# Patient Record
Sex: FEMALE | Race: AMERICAN INDIAN OR ALASKA NATIVE
[De-identification: names, ages, dates, MRNs, and addresses within clinical notes are randomized per-mention and may not be internally consistent; named-entity substitution may affect disease eponyms.]

---

## 2021-11-01 ENCOUNTER — APPOINTMENT (OUTPATIENT)
Dept: SURGERY | Facility: CLINIC | Age: 61
End: 2021-11-01
Payer: MEDICAID

## 2021-11-01 ENCOUNTER — NON-APPOINTMENT (OUTPATIENT)
Age: 61
End: 2021-11-01

## 2021-11-01 VITALS
HEART RATE: 81 BPM | TEMPERATURE: 97.3 F | HEIGHT: 61 IN | WEIGHT: 125 LBS | DIASTOLIC BLOOD PRESSURE: 80 MMHG | BODY MASS INDEX: 23.6 KG/M2 | SYSTOLIC BLOOD PRESSURE: 120 MMHG

## 2021-11-01 DIAGNOSIS — Z80.1 FAMILY HISTORY OF MALIGNANT NEOPLASM OF TRACHEA, BRONCHUS AND LUNG: ICD-10-CM

## 2021-11-01 DIAGNOSIS — E11.9 TYPE 2 DIABETES MELLITUS W/OUT COMPLICATIONS: ICD-10-CM

## 2021-11-01 DIAGNOSIS — Z78.9 OTHER SPECIFIED HEALTH STATUS: ICD-10-CM

## 2021-11-01 PROCEDURE — 99204 OFFICE O/P NEW MOD 45 MIN: CPT

## 2021-11-01 NOTE — CONSULT LETTER
[Dear  ___] : Dear  [unfilled], [Consult Letter:] : I had the pleasure of evaluating your patient, [unfilled]. [Please see my note below.] : Please see my note below. [Consult Closing:] : Thank you very much for allowing me to participate in the care of this patient.  If you have any questions, please do not hesitate to contact me. [FreeTextEntry3] : Sincerely,\par \par Marcus Becerra MD, Colon and Rectal Surgery\par \par [employer] [facility]\par [address] [place] [address]\par [facility], 3rd Floor\par [place], [place] [address]\par Tel [phone] ext [phone]\par Fax [phone]\par  [DrMatilda  ___] : Dr. RIVERA

## 2021-11-01 NOTE — HISTORY OF PRESENT ILLNESS
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  Patient underwent colonoscopy on 10/12/21 with Dr. Hart that showed an endoscopically obstructing proximal transverse colon mass.  This was biopsy proven adenocarcinoma.  CT Chest abdomen and pelvis showed 5mm RLL lobe and a circumferential mass in the distal transverse colon and multiple liver cysts.  CEA was 31.4. Patient denies fevers, chills, nausea, vomiting, abdominal pain, constipation, diarrhea, blood in his stool or unexpected weight loss.  Patient denies a family history of colon cancer rectal cancer or inflammatory bowel disease. \par

## 2021-11-01 NOTE — ASSESSMENT
[FreeTextEntry1] : 61F with distal transverse colon adenocarcinoma\par \par I had a long conversation with the patient about her condition. At this time she has a transverse colon adenocarcinoma without definite evidence of metastasis. We discussed the need for liver MRI to evaluate the liver lesions. We will see her back after her images to discuss treatment in details.

## 2021-11-01 NOTE — PHYSICAL EXAM
[Abdomen Masses] : No abdominal masses [Abdomen Tenderness] : ~T No ~M abdominal tenderness [No HSM] : no hepatosplenomegaly [Respiratory Effort] : normal respiratory effort [Normal Rate and Rhythm] : normal rate and rhythm [de-identified] : awake, alert and in no acute distress

## 2021-11-02 ENCOUNTER — NON-APPOINTMENT (OUTPATIENT)
Age: 61
End: 2021-11-02

## 2021-11-22 ENCOUNTER — APPOINTMENT (OUTPATIENT)
Dept: SURGERY | Facility: CLINIC | Age: 61
End: 2021-11-22
Payer: MEDICAID

## 2021-11-22 VITALS
DIASTOLIC BLOOD PRESSURE: 76 MMHG | WEIGHT: 124 LBS | HEIGHT: 61 IN | BODY MASS INDEX: 23.41 KG/M2 | SYSTOLIC BLOOD PRESSURE: 130 MMHG | TEMPERATURE: 97.8 F | HEART RATE: 106 BPM | OXYGEN SATURATION: 98 %

## 2021-11-22 PROCEDURE — 99214 OFFICE O/P EST MOD 30 MIN: CPT

## 2021-11-22 NOTE — CONSULT LETTER
[Dear  ___] : Dear  [unfilled], [Courtesy Letter:] : I had the pleasure of seeing your patient, [unfilled], in my office today. [Please see my note below.] : Please see my note below. [Consult Closing:] : Thank you very much for allowing me to participate in the care of this patient.  If you have any questions, please do not hesitate to contact me. [FreeTextEntry3] : Sincerely,\par \par Marcus Becerra MD, Colon and Rectal Surgery\par \par [employer] at [facility]\par [address] [place] [address]\par [facility], 3rd Floor\par [place], [place] [address]\par Tel [phone] ext 2\par Fax [phone]\par  [DrMatilda  ___] : Dr. RIVERA

## 2021-11-22 NOTE — HISTORY OF PRESENT ILLNESS
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  Patient underwent colonoscopy on 10/12/21 with Dr. Hart that showed an endoscopically obstructing proximal transverse colon mass.  This was biopsy proven adenocarcinoma.  CT Chest abdomen and pelvis showed 5mm RLL lobe and a circumferential mass in the distal transverse colon and multiple liver cysts.  CEA was 31.4.  On her last visit she was sent for MRI of the liver which was negative for metastatic disease.  She presents to schedule surgery.  Patient denies fevers, chills, nausea, vomiting, abdominal pain, constipation, diarrhea, blood in his stool or unexpected weight loss.  Patient denies a family history of colon cancer rectal cancer or inflammatory bowel disease. \par

## 2021-11-22 NOTE — PHYSICAL EXAM
[Abdomen Masses] : No abdominal masses [Abdomen Tenderness] : ~T No ~M abdominal tenderness [No HSM] : no hepatosplenomegaly [Respiratory Effort] : normal respiratory effort [Normal Rate and Rhythm] : normal rate and rhythm [de-identified] : awake, alert and in no acute distress

## 2021-11-22 NOTE — ASSESSMENT
[FreeTextEntry1] : 61F with distal transverse colon adenocarcinoma\par \par I had a long discussion with the patient about her diagnosis. We discussed robot assisted laparoscopic splenic flexure resection, possible open, possible ostomy.  All risks benefits and alternatives were discussed including bleeding, infection, damage to surrounding structures, anastomotic leak, anastomotic stricture, cardiopulmonary events, thromboembolic events, neuropathy and hernia. We expect a 3-7 day hospital stay and 6-8 week recovery. Patient was in agreement with the plan.\par

## 2021-11-30 ENCOUNTER — NON-APPOINTMENT (OUTPATIENT)
Age: 61
End: 2021-11-30

## 2021-12-01 ENCOUNTER — INPATIENT (INPATIENT)
Facility: HOSPITAL | Age: 61
LOS: 4 days | Discharge: HOME | End: 2021-12-06
Attending: COLON & RECTAL SURGERY | Admitting: COLON & RECTAL SURGERY
Payer: MEDICAID

## 2021-12-01 VITALS
WEIGHT: 119.93 LBS | DIASTOLIC BLOOD PRESSURE: 58 MMHG | HEIGHT: 62 IN | TEMPERATURE: 96 F | RESPIRATION RATE: 20 BRPM | HEART RATE: 105 BPM | SYSTOLIC BLOOD PRESSURE: 139 MMHG | OXYGEN SATURATION: 100 %

## 2021-12-01 LAB
ALBUMIN SERPL ELPH-MCNC: 4.3 G/DL — SIGNIFICANT CHANGE UP (ref 3.5–5.2)
ALP SERPL-CCNC: 117 U/L — HIGH (ref 30–115)
ALT FLD-CCNC: 19 U/L — SIGNIFICANT CHANGE UP (ref 0–41)
ANION GAP SERPL CALC-SCNC: 19 MMOL/L — HIGH (ref 7–14)
ANISOCYTOSIS BLD QL: SLIGHT — SIGNIFICANT CHANGE UP
APPEARANCE UR: CLEAR — SIGNIFICANT CHANGE UP
AST SERPL-CCNC: 19 U/L — SIGNIFICANT CHANGE UP (ref 0–41)
BACTERIA # UR AUTO: NEGATIVE — SIGNIFICANT CHANGE UP
BASOPHILS # BLD AUTO: 0 K/UL — SIGNIFICANT CHANGE UP (ref 0–0.2)
BASOPHILS NFR BLD AUTO: 0 % — SIGNIFICANT CHANGE UP (ref 0–1)
BILIRUB SERPL-MCNC: 0.6 MG/DL — SIGNIFICANT CHANGE UP (ref 0.2–1.2)
BILIRUB UR-MCNC: NEGATIVE — SIGNIFICANT CHANGE UP
BUN SERPL-MCNC: 17 MG/DL — SIGNIFICANT CHANGE UP (ref 10–20)
CALCIUM SERPL-MCNC: 9 MG/DL — SIGNIFICANT CHANGE UP (ref 8.5–10.1)
CHLORIDE SERPL-SCNC: 96 MMOL/L — LOW (ref 98–110)
CO2 SERPL-SCNC: 21 MMOL/L — SIGNIFICANT CHANGE UP (ref 17–32)
COLOR SPEC: YELLOW — SIGNIFICANT CHANGE UP
CREAT SERPL-MCNC: 0.6 MG/DL — LOW (ref 0.7–1.5)
DIFF PNL FLD: NEGATIVE — SIGNIFICANT CHANGE UP
EOSINOPHIL # BLD AUTO: 0 K/UL — SIGNIFICANT CHANGE UP (ref 0–0.7)
EOSINOPHIL NFR BLD AUTO: 0 % — SIGNIFICANT CHANGE UP (ref 0–8)
EPI CELLS # UR: 1 /HPF — SIGNIFICANT CHANGE UP (ref 0–5)
GIANT PLATELETS BLD QL SMEAR: PRESENT — SIGNIFICANT CHANGE UP
GLUCOSE SERPL-MCNC: 191 MG/DL — HIGH (ref 70–99)
GLUCOSE UR QL: ABNORMAL
HCT VFR BLD CALC: 27.9 % — LOW (ref 37–47)
HGB BLD-MCNC: 8.3 G/DL — LOW (ref 12–16)
HYALINE CASTS # UR AUTO: 1 /LPF — SIGNIFICANT CHANGE UP (ref 0–7)
HYPOCHROMIA BLD QL: SIGNIFICANT CHANGE UP
KETONES UR-MCNC: ABNORMAL
LACTATE SERPL-SCNC: 2 MMOL/L — SIGNIFICANT CHANGE UP (ref 0.7–2)
LEUKOCYTE ESTERASE UR-ACNC: NEGATIVE — SIGNIFICANT CHANGE UP
LIDOCAIN IGE QN: 28 U/L — SIGNIFICANT CHANGE UP (ref 7–60)
LYMPHOCYTES # BLD AUTO: 0.82 K/UL — LOW (ref 1.2–3.4)
LYMPHOCYTES # BLD AUTO: 6.1 % — LOW (ref 20.5–51.1)
MANUAL SMEAR VERIFICATION: SIGNIFICANT CHANGE UP
MCHC RBC-ENTMCNC: 21.7 PG — LOW (ref 27–31)
MCHC RBC-ENTMCNC: 29.7 G/DL — LOW (ref 32–37)
MCV RBC AUTO: 72.8 FL — LOW (ref 81–99)
MICROCYTES BLD QL: SLIGHT — SIGNIFICANT CHANGE UP
MONOCYTES # BLD AUTO: 0.47 K/UL — SIGNIFICANT CHANGE UP (ref 0.1–0.6)
MONOCYTES NFR BLD AUTO: 3.5 % — SIGNIFICANT CHANGE UP (ref 1.7–9.3)
NEUTROPHILS # BLD AUTO: 12.1 K/UL — HIGH (ref 1.4–6.5)
NEUTROPHILS NFR BLD AUTO: 90.4 % — HIGH (ref 42.2–75.2)
NITRITE UR-MCNC: NEGATIVE — SIGNIFICANT CHANGE UP
NRBC # BLD: 1 /100 — HIGH (ref 0–0)
NRBC # BLD: SIGNIFICANT CHANGE UP /100 WBCS (ref 0–0)
OVALOCYTES BLD QL SMEAR: SLIGHT — SIGNIFICANT CHANGE UP
PH UR: 5.5 — SIGNIFICANT CHANGE UP (ref 5–8)
PLAT MORPH BLD: NORMAL — SIGNIFICANT CHANGE UP
PLATELET # BLD AUTO: 601 K/UL — HIGH (ref 130–400)
POIKILOCYTOSIS BLD QL AUTO: SIGNIFICANT CHANGE UP
POLYCHROMASIA BLD QL SMEAR: SLIGHT — SIGNIFICANT CHANGE UP
POTASSIUM SERPL-MCNC: 4 MMOL/L — SIGNIFICANT CHANGE UP (ref 3.5–5)
POTASSIUM SERPL-SCNC: 4 MMOL/L — SIGNIFICANT CHANGE UP (ref 3.5–5)
PROT SERPL-MCNC: 7.2 G/DL — SIGNIFICANT CHANGE UP (ref 6–8)
PROT UR-MCNC: ABNORMAL
RBC # BLD: 3.83 M/UL — LOW (ref 4.2–5.4)
RBC # FLD: 16.5 % — HIGH (ref 11.5–14.5)
RBC BLD AUTO: ABNORMAL
RBC CASTS # UR COMP ASSIST: 3 /HPF — SIGNIFICANT CHANGE UP (ref 0–4)
SCHISTOCYTES BLD QL AUTO: SLIGHT — SIGNIFICANT CHANGE UP
SODIUM SERPL-SCNC: 136 MMOL/L — SIGNIFICANT CHANGE UP (ref 135–146)
SP GR SPEC: 1.02 — SIGNIFICANT CHANGE UP (ref 1.01–1.03)
TROPONIN T SERPL-MCNC: <0.01 NG/ML — SIGNIFICANT CHANGE UP
UROBILINOGEN FLD QL: SIGNIFICANT CHANGE UP
WBC # BLD: 13.38 K/UL — HIGH (ref 4.8–10.8)
WBC # FLD AUTO: 13.38 K/UL — HIGH (ref 4.8–10.8)
WBC UR QL: 2 /HPF — SIGNIFICANT CHANGE UP (ref 0–5)

## 2021-12-01 PROCEDURE — 93010 ELECTROCARDIOGRAM REPORT: CPT

## 2021-12-01 PROCEDURE — 71045 X-RAY EXAM CHEST 1 VIEW: CPT | Mod: 26

## 2021-12-01 PROCEDURE — 99285 EMERGENCY DEPT VISIT HI MDM: CPT

## 2021-12-01 RX ORDER — SODIUM CHLORIDE 9 MG/ML
1000 INJECTION INTRAMUSCULAR; INTRAVENOUS; SUBCUTANEOUS ONCE
Refills: 0 | Status: COMPLETED | OUTPATIENT
Start: 2021-12-01 | End: 2021-12-01

## 2021-12-01 RX ORDER — IOHEXOL 300 MG/ML
30 INJECTION, SOLUTION INTRAVENOUS ONCE
Refills: 0 | Status: COMPLETED | OUTPATIENT
Start: 2021-12-01 | End: 2021-12-01

## 2021-12-01 RX ORDER — MORPHINE SULFATE 50 MG/1
4 CAPSULE, EXTENDED RELEASE ORAL ONCE
Refills: 0 | Status: DISCONTINUED | OUTPATIENT
Start: 2021-12-01 | End: 2021-12-01

## 2021-12-01 RX ADMIN — IOHEXOL 30 MILLILITER(S): 300 INJECTION, SOLUTION INTRAVENOUS at 20:33

## 2021-12-01 RX ADMIN — SODIUM CHLORIDE 1000 MILLILITER(S): 9 INJECTION INTRAMUSCULAR; INTRAVENOUS; SUBCUTANEOUS at 20:33

## 2021-12-01 RX ADMIN — MORPHINE SULFATE 4 MILLIGRAM(S): 50 CAPSULE, EXTENDED RELEASE ORAL at 20:34

## 2021-12-01 NOTE — ED ADULT NURSE NOTE - NSIMPLEMENTINTERV_GEN_ALL_ED
Implemented All Universal Safety Interventions:  [place] to call system. Call bell, personal items and telephone within reach. Instruct patient to call for assistance. Room bathroom lighting operational. Non-slip footwear when patient is off stretcher. Physically safe environment: no spills, clutter or unnecessary equipment. Stretcher in lowest position, wheels locked, appropriate side rails in place.

## 2021-12-01 NOTE — ED PROVIDER NOTE - PROGRESS NOTE DETAILS
AN: Sign out received from Dr. CYNDY Laboy.  Pt presented with complaints of abd pain, nausea and vomiting. + hx of colon ca. Required labs, imaging, meds. Pending CT results and will dispo accordingly. PRICILA LAWLER: disucssed with surgical team. they will come eval pt. PRICILA LAWLER: accepted to dr nieves freeman

## 2021-12-01 NOTE — ED PROVIDER NOTE - PHYSICAL EXAMINATION
Gen: NAD, AOx3  Head: NCAT  HEENT: PERRL, oral mucosa moist, normal conjunctiva, oropharynx clear without exudate or erythema  Lung: CTAB, no respiratory distress, no wheezing, rales, rhonchi  CV: normal s1/s2, rrr, Normal perfusion, pulses 2+ throughout  Abd: soft, diffuse tenderness, ND, no CVA tenderness  Genitourinary: no pelvic tenderness  MSK: No edema, no visible deformities, full range of motion in all 4 extremities  Neuro: CN II-XII grossly intact, No focal neurologic deficits  Skin: No rash   Psych: normal affect

## 2021-12-01 NOTE — ED PROVIDER NOTE - OBJECTIVE STATEMENT
60 yo female with a pmh of colon CA and DM presents c/o abdominal pain for 3 days. pain is described as sharp in nature diffusely with no radiation or noted aggravating/alleviating factors. pt has not passed her bowels in 3 days and since yesterday experienced 5 episodes of NBNB emesis. pt denies any other symptoms including fevers, chill, headache, recent illness/travel, cough, chest pain, or SOB.

## 2021-12-01 NOTE — ED PROVIDER NOTE - ATTENDING CONTRIBUTION TO CARE
60 yo F pmh colon ca and DM pw abd pain. Abd pain x3 days. Sharp, lower quadrant with no radiation. Associated with no BM for the past 3 days and several episodes nbnb vomiting. No cp, no sob, no urinary sx, no f/c, no back pain, no palpitations, no dizziness, no headache.     CONSTITUTIONAL: Well-developed; well-nourished; in no acute distress. Sitting up and providing appropriate history and physical examination  SKIN: skin exam is warm and dry, no acute rash.  HEAD: Normocephalic; atraumatic.  EYES: PERRL, 3 mm bilateral, no nystagmus, EOM intact; conjunctiva and sclera clear.  ENT: No nasal discharge; airway clear.  NECK: Supple; non tender. + full passive ROM in all directions. No JVD  CARD: S1, S2 normal; no murmurs, gallops, or rubs. Regular rate and rhythm. + Symmetric Strong Pulses  RESP: No wheezes, rales or rhonchi. Good air movement bilaterally  ABD: +ttp diffusely over abdomen. soft; non-distended. No Rebound, No Guarding, No signs of peritonitis, No CVA tenderness. No pulsatile abdominal mass. + Strong and Symmetric Pulses  EXT: Normal ROM. No clubbing, cyanosis or edema. Dp and Pt Pulses intact. Cap refill less than 3 seconds  NEURO: CN 2-12 intact, normal finger to nose, normal romberg, no sensory or motor deficits, Alert, oriented, grossly unremarkable. No Focal deficits. GCS 15. NIH 0  PSYCH: Cooperative, appropriate.

## 2021-12-01 NOTE — ED PROVIDER NOTE - CARE PLAN
Principal Discharge DX:	Small bowel obstruction  Secondary Diagnosis:	Large bowel obstruction  Secondary Diagnosis:	Colon cancer   1

## 2021-12-01 NOTE — ED ADULT NURSE NOTE - OBJECTIVE STATEMENT
Patient presents with c/o abdominal pain, vomiting and constipation x3 days. hx of colon ca. Denies fever, chills, diarrhea. As per patient, MD told her to come to ED for evaluation. Daughter assisting with translation.

## 2021-12-01 NOTE — ED ADULT TRIAGE NOTE - CHIEF COMPLAINT QUOTE
As per , "she has a lot of pain in her abdomen and has not had a bowel movement in a few days. She is also vomiting." Pt with hx of colon cancer.

## 2021-12-01 NOTE — ED PROVIDER NOTE - NS ED ROS FT
Constitutional: (-) fever  Eyes/ENT: (-) visual changes   Cardiovascular: (-) chest pain, (-) syncope  Respiratory: (-) cough, (-) shortness of breath  Gastrointestinal: (+) vomiting, (-) diarrhea, abd pain  Genitourinary: (-) dysuria, (-) hesitancy, (-) frequency   Musculoskeletal: (-) neck pain, (-) back pain, (-) joint pain  Integumentary: (-) rash, (-) edema  Neurological: (-) headache, (-) altered mental status  Allergic/Immunologic: (-) pruritus

## 2021-12-01 NOTE — ED PROVIDER NOTE - CLINICAL SUMMARY MEDICAL DECISION MAKING FREE TEXT BOX
Sign out received from Dr. CYNDY Laboy.  Pt presented with complaints of abd pain, nausea and vomiting. Required labs, imaging, meds. Was found to have small and large bowel obstruction. Surgical team consulted. Pt to be admitted to their service for further workup.

## 2021-12-02 ENCOUNTER — NON-APPOINTMENT (OUTPATIENT)
Age: 61
End: 2021-12-02

## 2021-12-02 LAB
ANION GAP SERPL CALC-SCNC: 16 MMOL/L — HIGH (ref 7–14)
BASOPHILS # BLD AUTO: 0.01 K/UL — SIGNIFICANT CHANGE UP (ref 0–0.2)
BASOPHILS NFR BLD AUTO: 0.2 % — SIGNIFICANT CHANGE UP (ref 0–1)
BUN SERPL-MCNC: 22 MG/DL — HIGH (ref 10–20)
CALCIUM SERPL-MCNC: 8.4 MG/DL — LOW (ref 8.5–10.1)
CHLORIDE SERPL-SCNC: 102 MMOL/L — SIGNIFICANT CHANGE UP (ref 98–110)
CO2 SERPL-SCNC: 19 MMOL/L — SIGNIFICANT CHANGE UP (ref 17–32)
CREAT SERPL-MCNC: 0.6 MG/DL — LOW (ref 0.7–1.5)
EOSINOPHIL # BLD AUTO: 0.01 K/UL — SIGNIFICANT CHANGE UP (ref 0–0.7)
EOSINOPHIL NFR BLD AUTO: 0.2 % — SIGNIFICANT CHANGE UP (ref 0–8)
GLUCOSE BLDC GLUCOMTR-MCNC: 146 MG/DL — HIGH (ref 70–99)
GLUCOSE BLDC GLUCOMTR-MCNC: 166 MG/DL — HIGH (ref 70–99)
GLUCOSE BLDC GLUCOMTR-MCNC: 167 MG/DL — HIGH (ref 70–99)
GLUCOSE SERPL-MCNC: 141 MG/DL — HIGH (ref 70–99)
HCT VFR BLD CALC: 24.9 % — LOW (ref 37–47)
HGB BLD-MCNC: 7.3 G/DL — LOW (ref 12–16)
IMM GRANULOCYTES NFR BLD AUTO: 0.2 % — SIGNIFICANT CHANGE UP (ref 0.1–0.3)
LACTATE SERPL-SCNC: 1.3 MMOL/L — SIGNIFICANT CHANGE UP (ref 0.7–2)
LYMPHOCYTES # BLD AUTO: 1.18 K/UL — LOW (ref 1.2–3.4)
LYMPHOCYTES # BLD AUTO: 18.8 % — LOW (ref 20.5–51.1)
MAGNESIUM SERPL-MCNC: 2.5 MG/DL — HIGH (ref 1.8–2.4)
MCHC RBC-ENTMCNC: 21.5 PG — LOW (ref 27–31)
MCHC RBC-ENTMCNC: 29.3 G/DL — LOW (ref 32–37)
MCV RBC AUTO: 73.5 FL — LOW (ref 81–99)
MONOCYTES # BLD AUTO: 0.59 K/UL — SIGNIFICANT CHANGE UP (ref 0.1–0.6)
MONOCYTES NFR BLD AUTO: 9.4 % — HIGH (ref 1.7–9.3)
NEUTROPHILS # BLD AUTO: 4.48 K/UL — SIGNIFICANT CHANGE UP (ref 1.4–6.5)
NEUTROPHILS NFR BLD AUTO: 71.2 % — SIGNIFICANT CHANGE UP (ref 42.2–75.2)
NRBC # BLD: 0 /100 WBCS — SIGNIFICANT CHANGE UP (ref 0–0)
PHOSPHATE SERPL-MCNC: 3.8 MG/DL — SIGNIFICANT CHANGE UP (ref 2.1–4.9)
PLATELET # BLD AUTO: 495 K/UL — HIGH (ref 130–400)
POTASSIUM SERPL-MCNC: 4.1 MMOL/L — SIGNIFICANT CHANGE UP (ref 3.5–5)
POTASSIUM SERPL-SCNC: 4.1 MMOL/L — SIGNIFICANT CHANGE UP (ref 3.5–5)
RBC # BLD: 3.39 M/UL — LOW (ref 4.2–5.4)
RBC # FLD: 16.9 % — HIGH (ref 11.5–14.5)
SARS-COV-2 RNA SPEC QL NAA+PROBE: SIGNIFICANT CHANGE UP
SODIUM SERPL-SCNC: 137 MMOL/L — SIGNIFICANT CHANGE UP (ref 135–146)
WBC # BLD: 6.28 K/UL — SIGNIFICANT CHANGE UP (ref 4.8–10.8)
WBC # FLD AUTO: 6.28 K/UL — SIGNIFICANT CHANGE UP (ref 4.8–10.8)

## 2021-12-02 PROCEDURE — 99223 1ST HOSP IP/OBS HIGH 75: CPT

## 2021-12-02 PROCEDURE — 71045 X-RAY EXAM CHEST 1 VIEW: CPT | Mod: 26

## 2021-12-02 PROCEDURE — 74177 CT ABD & PELVIS W/CONTRAST: CPT | Mod: 26,MA

## 2021-12-02 PROCEDURE — 99221 1ST HOSP IP/OBS SF/LOW 40: CPT | Mod: GC

## 2021-12-02 PROCEDURE — 71045 X-RAY EXAM CHEST 1 VIEW: CPT | Mod: 26,77

## 2021-12-02 RX ORDER — INSULIN LISPRO 100/ML
VIAL (ML) SUBCUTANEOUS EVERY 6 HOURS
Refills: 0 | Status: DISCONTINUED | OUTPATIENT
Start: 2021-12-02 | End: 2021-12-06

## 2021-12-02 RX ORDER — DEXTROSE 50 % IN WATER 50 %
15 SYRINGE (ML) INTRAVENOUS ONCE
Refills: 0 | Status: DISCONTINUED | OUTPATIENT
Start: 2021-12-02 | End: 2021-12-06

## 2021-12-02 RX ORDER — GLUCAGON INJECTION, SOLUTION 0.5 MG/.1ML
1 INJECTION, SOLUTION SUBCUTANEOUS ONCE
Refills: 0 | Status: DISCONTINUED | OUTPATIENT
Start: 2021-12-02 | End: 2021-12-06

## 2021-12-02 RX ORDER — SODIUM CHLORIDE 9 MG/ML
1000 INJECTION, SOLUTION INTRAVENOUS
Refills: 0 | Status: DISCONTINUED | OUTPATIENT
Start: 2021-12-02 | End: 2021-12-05

## 2021-12-02 RX ORDER — KETOROLAC TROMETHAMINE 30 MG/ML
15 SYRINGE (ML) INJECTION EVERY 6 HOURS
Refills: 0 | Status: DISCONTINUED | OUTPATIENT
Start: 2021-12-02 | End: 2021-12-06

## 2021-12-02 RX ORDER — ENOXAPARIN SODIUM 100 MG/ML
40 INJECTION SUBCUTANEOUS EVERY 24 HOURS
Refills: 0 | Status: DISCONTINUED | OUTPATIENT
Start: 2021-12-02 | End: 2021-12-06

## 2021-12-02 RX ORDER — PANTOPRAZOLE SODIUM 20 MG/1
40 TABLET, DELAYED RELEASE ORAL DAILY
Refills: 0 | Status: DISCONTINUED | OUTPATIENT
Start: 2021-12-02 | End: 2021-12-05

## 2021-12-02 RX ORDER — CHLORHEXIDINE GLUCONATE 213 G/1000ML
1 SOLUTION TOPICAL
Refills: 0 | Status: DISCONTINUED | OUTPATIENT
Start: 2021-12-02 | End: 2021-12-06

## 2021-12-02 RX ORDER — DEXTROSE 50 % IN WATER 50 %
25 SYRINGE (ML) INTRAVENOUS ONCE
Refills: 0 | Status: DISCONTINUED | OUTPATIENT
Start: 2021-12-02 | End: 2021-12-06

## 2021-12-02 RX ORDER — SODIUM CHLORIDE 9 MG/ML
1000 INJECTION, SOLUTION INTRAVENOUS
Refills: 0 | Status: DISCONTINUED | OUTPATIENT
Start: 2021-12-02 | End: 2021-12-06

## 2021-12-02 RX ADMIN — Medication 15 MILLIGRAM(S): at 07:44

## 2021-12-02 RX ADMIN — PANTOPRAZOLE SODIUM 40 MILLIGRAM(S): 20 TABLET, DELAYED RELEASE ORAL at 12:52

## 2021-12-02 RX ADMIN — SODIUM CHLORIDE 100 MILLILITER(S): 9 INJECTION, SOLUTION INTRAVENOUS at 07:45

## 2021-12-02 RX ADMIN — Medication 15 MILLIGRAM(S): at 19:55

## 2021-12-02 NOTE — ED ADULT NURSE REASSESSMENT NOTE - NS ED NURSE REASSESS COMMENT FT1
Patient resting calmly and comfortably at this time. No distress noted. Pending CT scan. Safety maintained.
Patient came to the ER with her daughter complaining of ABD pain. Patient is chinese speaking. Patient denies any SOB/CP. Patient abdominal  is distended. VSS. Will continue to monitor for acute changes in vitals.

## 2021-12-02 NOTE — PATIENT PROFILE ADULT - FALL HARM RISK - HARM RISK INTERVENTIONS

## 2021-12-02 NOTE — CONSULT NOTE ADULT - ATTENDING COMMENTS
Patient seen and examined during the GI advanced endoscopy rounds, case discussed with the team assessment and plan as above

## 2021-12-02 NOTE — H&P ADULT - ASSESSMENT
61F with a PMHX of colon CA, known mass at the distal transverse colon, and DM presents c/o abdominal pain with no bowel movements or gas for 3 days. Clinical presentation and imaging consistent with LBO 2/2 colonic mass.     Plan:  -Admission to Surgery Service   -GI consult for possible stenting across the area of obstruction  -NPO, IVF  -NGT placement for decompression   -Adequate pain control  -LVX for DVT ppx   -PTX for GI ppx  -Insulin sliding scale  -Patient and plan discussed with Dr. Becerra

## 2021-12-02 NOTE — H&P ADULT - ATTENDING COMMENTS
Patient is a 61F with PMH colon CA, schedule for OR on 12/16 and DM who presents with abdominal pain, constipation, nausea and vomiting.  Found to have LBO on CTAP.       Patient seen and examined.  Reports mild abdominal pain    Abdomen - soft, mild TTP, mild distention.  No rebound or guarding    Vitals labs and images reviewed    CTAP - Distal small bowel and and proximal large bowel obstruction to the level of the splenic flexure where there is a colonic mural nodule measuring 1.2 cm, concerning for primary neoplasm. Direct visualization with colonoscopy is suggested.    Plan  - NPO  - IVF  - NGT  - GI for colonic stent placement  - GI DVT PPX

## 2021-12-02 NOTE — ED ADULT NURSE REASSESSMENT NOTE - GENERAL PATIENT STATE
Handoff report taken from AMANDA Amanda. Pt in bed in no acute distress. Respirations even and unlabored. Daughter at the bedside./comfortable appearance/cooperative/family/SO at bedside/no change observed

## 2021-12-02 NOTE — H&P ADULT - NSHPPHYSICALEXAM_GEN_ALL_CORE
PHYSICAL EXAM:  GENERAL: NAD  CHEST/LUNG: Clear to auscultation bilaterally  HEART: Regular rate and rhythm  ABDOMEN: Soft, distended, with tenderness at the RLQ and umbilical region, no rebound or guarding  EXTREMITIES:  No clubbing, cyanosis, or edema

## 2021-12-02 NOTE — CONSULT NOTE ADULT - ASSESSMENT
61F with a PMHX of colon CA ( diagnosed 2 Months ago, by Dr. Donahue after colonoscopy for hematochezia, she was supposed to have surgery by Dr. Becerra this month))  and DM presents c/o abdominal pain with no bowel movements or gas for 3 days. She describes her pain as sharp in nature diffusely with no radiation or noted aggravating/alleviating factors. Reports her last BM/gas was on Monday and has had two days of nausea and vomiting. In the ED she is afebrile, mildly tachycardic to 105 (resolved), normotensive, saturating 97% on room air. On exam her abdomen is soft, distended, and tender in the RLQ and umbilical region, no rebound or guarding. Labs significant for WBC of 13.3, lactate normal at 2.0.      # LBO due to splenic flexure colonic mass:  - CT: Distal small bowel and and proximal large bowel obstruction to the level of the splenic flexure where there is a colonic mural nodule measuring 1.2 cm, concerning for primary neoplasm.  - ? liver mets on CT scan   - NG tube on suction--> about 100 cc clear liquid came out    Rec:  - NPO  - NG-tube on intermittent suction  - Serial abdominal exam  - Tap water enema X2   - dis cuss with attending regarding colonoscopy with possible colonic stent placmment 61F with a PMHX of colon CA ( diagnosed 2 Months ago, by Dr. Donahue after colonoscopy for hematochezia, she was supposed to have surgery by Dr. Becerra this month))  and DM presents c/o abdominal pain with no bowel movements or gas for 3 days. She describes her pain as sharp in nature diffusely with no radiation or noted aggravating/alleviating factors. Reports her last BM/gas was on Monday and has had two days of nausea and vomiting. In the ED she is afebrile, mildly tachycardic to 105 (resolved), normotensive, saturating 97% on room air. On exam her abdomen is soft, distended, and tender in the RLQ and umbilical region, no rebound or guarding. Labs significant for WBC of 13.3, lactate normal at 2.0.      # LBO due to splenic flexure colonic mass:  - CT: Distal small bowel and and proximal large bowel obstruction to the level of the splenic flexure where there is a colonic mural nodule measuring 1.2 cm, concerning for primary neoplasm.  - ? liver mets on CT scan   - NG tube on suction--> about 100 cc clear liquid came out    Rec:  - NPO  - NG-tube on intermittent suction  - Serial abdominal exam  - Miranda drip enema   - dis cuss with attending regarding colonoscopy with possible colonic stent placmment 61F with a PMHX of colon CA ( diagnosed 2 Months ago, by Dr. Donahue after colonoscopy for hematochezia, she was supposed to have surgery by Dr. Becerra this month))  and DM presents c/o abdominal pain with no bowel movements or gas for 3 days. She describes her pain as sharp in nature diffusely with no radiation or noted aggravating/alleviating factors. Reports her last BM/gas was on Monday and has had two days of nausea and vomiting. In the ED she is afebrile, mildly tachycardic to 105 (resolved), normotensive, saturating 97% on room air. On exam her abdomen is soft, distended, and tender in the RLQ and umbilical region, no rebound or guarding. Labs significant for WBC of 13.3, lactate normal at 2.0.      # LBO due to splenic flexure colonic mass:  - CT: Distal small bowel and and proximal large bowel obstruction to the level of the splenic flexure where there is a colonic mural nodule measuring 1.2 cm, concerning for primary neoplasm.  - ? liver mets on CT scan   - NG tube on suction--> about 100 cc clear liquid came out    Rec:  - NPO  - NG-tube on intermittent suction  - Serial abdominal exam  - Miranda drip enema   - colonoscopy with possible colonic stent placement tomorrow

## 2021-12-02 NOTE — CONSULT NOTE ADULT - SUBJECTIVE AND OBJECTIVE BOX
Gastroenterology Consultation:    Patient is a 61y old  Female who presents with a chief complaint of Large bowel obstruction (02 Dec 2021 03:54)      Admitted on: 12-02-21  HPI:  61F with a PMHX of colon CA ( diagnosed 2 Months ago, by Dr. Donahue after colonoscopy for hematochezia, she was supposed to have surgery by Dr. Becerra this month))  and DM presents c/o abdominal pain with no bowel movements or gas for 3 days. She describes her pain as sharp in nature diffusely with no radiation or noted aggravating/alleviating factors. Reports her last BM/gas was on Monday and has had two days of nausea and vomiting. In the ED she is afebrile, mildly tachycardic to 105 (resolved), normotensive, saturating 97% on room air. On exam her abdomen is soft, distended, and tender in the RLQ and umbilical region, no rebound or guarding. Labs significant for WBC of 13.3, lactate normal at 2.0.      Prior records Reviewed (Y/N): Y  History obtained from person other than patient (Y/N): Y, daughter at bedside)      Prior Colonoscopy:  Dr. Donahue 2 months ago, found colon cancer      PAST MEDICAL & SURGICAL HISTORY:      FAMILY HISTORY:  non-contributory    Social History:  Tobacco: N  Alcohol: N  Drugs: N    Home Medications:    MEDICATIONS  (STANDING):  chlorhexidine 4% Liquid 1 Application(s) Topical <User Schedule>  dextrose 40% Gel 15 Gram(s) Oral once  dextrose 5%. 1000 milliLiter(s) (50 mL/Hr) IV Continuous <Continuous>  dextrose 50% Injectable 25 Gram(s) IV Push once  enoxaparin Injectable 40 milliGRAM(s) SubCutaneous every 24 hours  glucagon  Injectable 1 milliGRAM(s) IntraMuscular once  insulin lispro (ADMELOG) corrective regimen sliding scale   SubCutaneous every 6 hours  lactated ringers. 1000 milliLiter(s) (100 mL/Hr) IV Continuous <Continuous>  pantoprazole  Injectable 40 milliGRAM(s) IV Push daily    MEDICATIONS  (PRN):  ketorolac   Injectable 15 milliGRAM(s) IV Push every 6 hours PRN Moderate Pain (4 - 6)      Allergies  No Known Allergies      Review of Systems:   Constitutional:  No Fever, No Chills  ENT/Mouth:  No Hearing Changes,  No Difficulty Swallowing  Eyes:  No Eye Pain, No Vision Changes  Cardiovascular:  No Chest Pain, No Palpitations  Respiratory:  No Cough, No Dyspnea  Gastrointestinal:  As described in HPI  Musculoskeletal:  No Joint Swelling, No Back Pain  Skin:  No Skin Lesions, No Jaundice  Neuro:  No Syncope, No Dizziness  Heme/Lymph:  No Bruising, No Bleeding.          Physical Examination:  T(C): 37.1 (12-02-21 @ 07:46), Max: 37.3 (12-02-21 @ 00:46)  HR: 61 (12-02-21 @ 07:46) (61 - 105)  BP: 115/68 (12-02-21 @ 07:46) (114/55 - 139/58)  RR: 19 (12-02-21 @ 07:46) (18 - 20)  SpO2: 98% (12-02-21 @ 07:46) (97% - 100%)  Height (cm): 157.5 (12-01-21 @ 18:39)  Weight (kg): 54.4 (12-01-21 @ 18:39)      Constitutional: No acute distress.  Eyes:. Conjunctivae are clear, Sclera is non-icteric.  Ears Nose and Throat: The external ears are normal appearing,  Oral mucosa is pink and moist.  Respiratory:  No signs of respiratory distress. Lung sounds are clear bilaterally.  Cardiovascular:  S1 S2, Regular rate and rhythm.  GI: Abdomen is soft, symmetric, and mild lower abdominal tenderness and mild abdominal distention. There are no visible lesions. Bowel sounds are present and normoactive in all four quadrants.  Neuro: No Tremor, No involuntary movements  Skin: No rashes, No Jaundice.          Data: (reviewed by attending)                        8.3    13.38 )-----------( 601      ( 01 Dec 2021 21:15 )             27.9     Hgb Trend:  8.3  12-01-21 @ 21:15      12-01    136  |  96<L>  |  17  ----------------------------<  191<H>  4.0   |  21  |  0.6<L>    Ca    9.0      01 Dec 2021 21:15    TPro  7.2  /  Alb  4.3  /  TBili  0.6  /  DBili  x   /  AST  19  /  ALT  19  /  AlkPhos  117<H>  12-01    Liver panel trend:  TBili 0.6   /   AST 19   /   ALT 19   /   AlkP 117   /   Tptn 7.2   /   Alb 4.3    /   DBili --      12-01              Radiology:(reviewed by attending)  CT Abdomen and Pelvis w/ Oral Cont and w/ IV Cont:   EXAM:  CT ABDOMEN AND PELVIS OC IC            PROCEDURE DATE:  12/02/2021            INTERPRETATION:  CLINICAL HISTORY / REASON FOR EXAM: Abdominal pain.    TECHNIQUE: Contiguous axial CT images were obtained from the lower chest to the pubic symphysis following administration of intravenous contrast. Oral contrast was administered. Reformatted images in the coronal and sagittal planes were acquired.    COMPARISON: None.    FINDINGS:    LOWER CHEST: Unremarkable.    HEPATOBILIARY: Numerous bilobar hepatic hypodensities measuring simple fluid.. Additional subcentimeter hypodensities too small to characterize.    SPLEEN: Unremarkable.    PANCREAS: Unremarkable.    ADRENAL GLANDS: Unremarkable.    KIDNEYS: Symmetric enhancement. No hydronephrosis. Right renal cyst.    ABDOMINOPELVIC NODES: No lymphadenopathy.    PELVIC ORGANS: Calcified fibroid uterus. Distended urinary bladder.    PERITONEUM/MESENTERY/BOWEL: Large bowel obstruction to the level of the splenic flexure where there is a colonic mural nodule measuring 1.2 cm (series 5 image 93) as well as abrupt change in caliber. Adjacent is an mesenteric infiltrative changes are noted as well. There is associated upstream dilatation of small bowel. No ascites or intraperitoneal free air. Unremarkable appendix.    BONES/SOFT TISSUES: No acute osseous abnormality. No definite lytic or blastic lesion is identified. There is grade 1 L4-L5 spondylolisthesis with associated lower lumbar facet arthropathy.    OTHER: Aorta is normal in caliber.      IMPRESSION:      Distal small bowel and and proximal large bowel obstruction to the level of the splenic flexure where there is a colonic mural nodule measuring 1.2 cm, concerning for primary neoplasm. Direct visualization with colonoscopy is suggested.    Numerous bilobar hepatic hypodensities measuring simple fluid and likely reflecting cysts. However, in view of the above findings, metastatic disease cannot be excluded. Further evaluation with MRI with and without contrast may be of benefit.    --- End of Report ---            BLU KAPOOR MD; Resident Radiologist  This document has been electronically signed.  ALANNA HENRIQUEZ MD; Attending Radiologist  This document has been electronically signed. Dec  2 2021  3:00AM (12-02-21 @ 01:34)

## 2021-12-02 NOTE — CONSULT NOTE ADULT - SUBJECTIVE AND OBJECTIVE BOX
GENERAL SURGERY CONSULT NOTE    Patient: WILFREDO SARKAR , 61y (56)Female   MRN: [id number]  Location: [facility] ED  Visit: 21 Emergency  Date: 21 @ 03:26    HPI:61F with a PMHX of colon CA and DM presents c/o abdominal pain for 3 days. pain is described as sharp in nature diffusely with no radiation or noted aggravating/alleviating factors. pt has not passed her bowels in 3 days and since yesterday experienced 5 episodes of NBNB emesis. pt denies any other symptoms including fevers, chill, headache, recent illness/travel, cough, chest pain, or SOB.    PAST MEDICAL & SURGICAL HISTORY:  -DM  -Colon Cancer    Home Medications:  -CALCIUM  -VITAMIN D  -MV  -FOLIC ACID  -METFORMIN      VITALS:  T(F): 99.1 (21 @ 00:46), Max: 99.1 (21 @ 00:46)  HR: 90 (21 @ 00:46) (90 - 105)  BP: 114/55 (21 @ 00:46) (114/55 - 139/58)  RR: 18 (21 @ 00:46) (18 - 20)  SpO2: 97% (21 @ 00:46) (97% - 100%)    PHYSICAL EXAM:  General: NAD, AAOx3, calm and cooperative  HEENT: NCAT, MARK, EOMI, Trachea ML, Neck supple  Cardiac: RRR S1, S2, no Murmurs, rubs or gallops  Respiratory: CTAB, normal respiratory effort, breath sounds equal BL, no wheeze, rhonchi or crackles  Abdomen: Soft, non-distended, non-tender, no rebound, no guarding. +BS.  Rectal: Good tone, +stool, no blood, no indigo-anal masses/lesions, no fistulas, fissures, hemorrhoids  Musculoskeletal: Strength 5/5 BL UE/LE, ROM intact, compartments soft  Neuro: Sensation grossly intact and equal throughout, no focal deficits  Vascular: Pulses 2+ throughout, extremities well perfused  Skin: Warm/dry, normal color, no jaundice  Incision/wound: healing well, dressings in place, clean, dry and intact    MEDICATIONS  (STANDING):    MEDICATIONS  (PRN):      LAB/STUDIES:                        8.3    13.38 )-----------( 601      ( 01 Dec 2021 21:15 )             27.9     12    136  |  96<L>  |  17  ----------------------------<  191<H>  4.0   |  21  |  0.6<L>    Ca    9.0      01 Dec 2021 21:15    TPro  7.2  /  Alb  4.3  /  TBili  0.6  /  DBili  x   /  AST  19  /  ALT  19  /  AlkPhos  117<H>  12-      LIVER FUNCTIONS - ( 01 Dec 2021 21:15 )  Alb: 4.3 g/dL / Pro: 7.2 g/dL / ALK PHOS: 117 U/L / ALT: 19 U/L / AST: 19 U/L / GGT: x           Urinalysis Basic - ( 01 Dec 2021 21:15 )    Color: Yellow / Appearance: Clear / S.022 / pH: x  Gluc: x / Ketone: Large  / Bili: Negative / Urobili: <2 mg/dL   Blood: x / Protein: 30 mg/dL / Nitrite: Negative   Leuk Esterase: Negative / RBC: 3 /HPF / WBC 2 /HPF   Sq Epi: x / Non Sq Epi: 1 /HPF / Bacteria: Negative    CARDIAC MARKERS ( 01 Dec 2021 21:15 )  x     / <0.01 ng/mL / x     / x     / x        IMAGING:  < from: CT Abdomen and Pelvis w/ Oral Cont and w/ IV Cont (21 @ 01:34) >  LOWER CHEST: Unremarkable.    HEPATOBILIARY: Numerous bilobar hepatic hypodensities measuring simple fluid.. Additional subcentimeter hypodensities too small to characterize.    SPLEEN: Unremarkable.    PANCREAS: Unremarkable.    ADRENAL GLANDS: Unremarkable.    KIDNEYS: Symmetric enhancement. No hydronephrosis. Right renal cyst.    ABDOMINOPELVIC NODES: No lymphadenopathy.    PELVIC ORGANS: Calcified fibroid uterus. Distended urinary bladder.    PERITONEUM/MESENTERY/BOWEL: Large bowel obstruction to the level of the splenic flexure where there is a colonic mural nodule measuring 1.2 cm (series 5 image 93) as well as abrupt change in caliber. Adjacent is an mesenteric infiltrative changes are noted as well. There is associated upstream dilatation of small bowel. No ascites or intraperitoneal free air. Unremarkable appendix.    BONES/SOFT TISSUES: No acute osseous abnormality. No definite lytic or blastic lesion is identified. There is grade 1 L4-L5 spondylolisthesis with associated lower lumbar facet arthropathy.    OTHER: Aorta is normal in caliber.    IMPRESSION:    Distal small bowel and and proximal large bowel obstruction to the level of the splenic flexure where there is a colonic mural nodule measuring 1.2 cm, concerning for primary neoplasm. Direct visualization with colonoscopy is suggested.    Numerous bilobar hepatic hypodensities measuring simple fluid and likely reflecting cysts. However, in view of the above findings, metastatic disease cannot be excluded. Further evaluation with MRI with and without contrast may be of benefit.    < end of copied text >      ACCESS DEVICES:  [ ] Peripheral IV  [ ] Central Venous Line	[ ] R	[ ] L	[ ] IJ	[ ] Fem	[ ] SC	Placed:   [ ] Arterial Line		[ ] R	[ ] L	[ ] Fem	[ ] Rad	[ ] Ax	Placed:   [ ] PICC:					[ ] Mediport  [ ] Urinary Catheter, Date Placed:     ASSESSMENT:  61F with a PMHX of colon CA and DM presents c/o abdominal pain for 3 days with no bowel movement and episodes of emesis. Surgery consulted to r/o bowel obstruction.    PLAN:  -  - Patient seen and examined with Senior Resident, Dr. Hampton  - Plan to be discussed with Attending, Dr. Hernandez    Lines/Tubes: PIV, Midline, Central Line, A-Line, Chest tubes, Zachary/VEENA drains, Rajput Catheter.    21 @ 03:26    CONSULT SPECTRA: [id number]   GENERAL SURGERY CONSULT NOTE    Patient: WILFREDO SARKAR , 61y (56)Female   MRN: [id number]  Location: [facility] ED  Visit: 21 Emergency  Date: 21 @ 03:26    Daughter translated at bedside     HPI:61F with a PMHX of colon CA and DM presents c/o abdominal pain for 3 days. Her pain is described as sharp in nature diffusely with no radiation or noted aggravating/alleviating factors. The patient has not passed her bowels in 3 days and has not passed gas. Since two days ago she experienced she 5 episodes of NBNB emesis. Patient denies any other symptoms including fevers, chill, headache, recent illness/travel, cough, chest pain, or SOB    PAST MEDICAL & SURGICAL HISTORY:  -DM  -Colon Cancer    Home Medications:  -CALCIUM  -VITAMIN D  -MV  -FOLIC ACID  -METFORMIN      VITALS:  T(F): 99.1 (21 @ 00:46), Max: 99.1 (21 @ 00:46)  HR: 90 (21 @ 00:46) (90 - 105)  BP: 114/55 (21 @ 00:46) (114/55 - 139/58)  RR: 18 (21 @ 00:46) (18 - 20)  SpO2: 97% (21 @ 00:46) (97% - 100%)    PHYSICAL EXAM:  General: NAD, AAOx3, calm and cooperative  HEENT: EOMI, Trachea ML, Neck supple  Cardiac: RRR   Respiratory: CTAB, normal respiratory effort, breath sounds equal BL, no wheeze, rhonchi or crackles  Abdomen: Soft, distended, tender, no rebound, no guarding  Skin: Warm/dry, normal color, no jaundice    MEDICATIONS  (STANDING):    MEDICATIONS  (PRN):      LAB/STUDIES:                        8.3    13.38 )-----------( 601      ( 01 Dec 2021 21:15 )             27.9         136  |  96<L>  |  17  ----------------------------<  191<H>  4.0   |  21  |  0.6<L>    Ca    9.0      01 Dec 2021 21:15    TPro  7.2  /  Alb  4.3  /  TBili  0.6  /  DBili  x   /  AST  19  /  ALT  19  /  AlkPhos  117<H>  12-01      LIVER FUNCTIONS - ( 01 Dec 2021 21:15 )  Alb: 4.3 g/dL / Pro: 7.2 g/dL / ALK PHOS: 117 U/L / ALT: 19 U/L / AST: 19 U/L / GGT: x           Urinalysis Basic - ( 01 Dec 2021 21:15 )    Color: Yellow / Appearance: Clear / S.022 / pH: x  Gluc: x / Ketone: Large  / Bili: Negative / Urobili: <2 mg/dL   Blood: x / Protein: 30 mg/dL / Nitrite: Negative   Leuk Esterase: Negative / RBC: 3 /HPF / WBC 2 /HPF   Sq Epi: x / Non Sq Epi: 1 /HPF / Bacteria: Negative    CARDIAC MARKERS ( 01 Dec 2021 21:15 )  x     / <0.01 ng/mL / x     / x     / x        IMAGING:  < from: CT Abdomen and Pelvis w/ Oral Cont and w/ IV Cont (21 @ 01:34) >  LOWER CHEST: Unremarkable.    HEPATOBILIARY: Numerous bilobar hepatic hypodensities measuring simple fluid.. Additional subcentimeter hypodensities too small to characterize.    SPLEEN: Unremarkable.    PANCREAS: Unremarkable.    ADRENAL GLANDS: Unremarkable.    KIDNEYS: Symmetric enhancement. No hydronephrosis. Right renal cyst.    ABDOMINOPELVIC NODES: No lymphadenopathy.    PELVIC ORGANS: Calcified fibroid uterus. Distended urinary bladder.    PERITONEUM/MESENTERY/BOWEL: Large bowel obstruction to the level of the splenic flexure where there is a colonic mural nodule measuring 1.2 cm (series 5 image 93) as well as abrupt change in caliber. Adjacent is an mesenteric infiltrative changes are noted as well. There is associated upstream dilatation of small bowel. No ascites or intraperitoneal free air. Unremarkable appendix.    BONES/SOFT TISSUES: No acute osseous abnormality. No definite lytic or blastic lesion is identified. There is grade 1 L4-L5 spondylolisthesis with associated lower lumbar facet arthropathy.    OTHER: Aorta is normal in caliber.    IMPRESSION:    Distal small bowel and and proximal large bowel obstruction to the level of the splenic flexure where there is a colonic mural nodule measuring 1.2 cm, concerning for primary neoplasm. Direct visualization with colonoscopy is suggested.    Numerous bilobar hepatic hypodensities measuring simple fluid and likely reflecting cysts. However, in view of the above findings, metastatic disease cannot be excluded. Further evaluation with MRI with and without contrast may be of benefit.    < end of copied text >

## 2021-12-02 NOTE — CONSULT NOTE ADULT - ASSESSMENT
ASSESSMENT:  61F with a PMHX of colon CA and DM presents c/o abdominal pain for 3 days with no bowel movement and episodes of emesis. Surgery consulted to r/o bowel obstruction.    PLAN:  - Patient seen and examined with Senior Resident, Dr. Hampton  - Plan to be discussed with Attending, Dr. Becerra    12-02-21 @ 03:26    CONSULT SPECTRA: [id number]

## 2021-12-02 NOTE — H&P ADULT - NSHPLABSRESULTS_GEN_ALL_CORE
Labs:                        8.3    13.38 )-----------( 601      ( 01 Dec 2021 21:15 )             27.9       Auto Neutrophil %: 90.4 % (21 @ 21:15)        136  |  96<L>  |  17  ----------------------------<  191<H>  4.0   |  21  |  0.6<L>    Calcium, Total Serum: 9.0 mg/dL (21 @ 21:15)    LFTs:             7.2  | 0.6  | 19       ------------------[117     ( 01 Dec 2021 21:15 )  4.3  | x    | 19          Lipase:28       Lactate, Blood: 2.0 mmol/L (21 @ 21:15)    Coags:    CARDIAC MARKERS ( 01 Dec 2021 21:15 )  x     / <0.01 ng/mL / x     / x     / x        Urinalysis Basic - ( 01 Dec 2021 21:15 )    Color: Yellow / Appearance: Clear / S.022 / pH: x  Gluc: x / Ketone: Large  / Bili: Negative / Urobili: <2 mg/dL   Blood: x / Protein: 30 mg/dL / Nitrite: Negative   Leuk Esterase: Negative / RBC: 3 /HPF / WBC 2 /HPF   Sq Epi: x / Non Sq Epi: 1 /HPF / Bacteria: Negative    Radiology:   < from: CT Abdomen and Pelvis w/ Oral Cont and w/ IV Cont (21 @ 01:34) >  IMPRESSION:  Distal small bowel and and proximal large bowel obstruction to the level of the splenic flexure where there is a colonic mural nodule measuring 1.2 cm, concerning for primary neoplasm. Direct visualization with colonoscopy is suggested.    Numerous bilobar hepatic hypodensities measuring simple fluid and likely reflecting cysts. However, in view of the above findings, metastatic disease cannot be excluded. Further evaluation with MRI with and without contrast may be of benefit.

## 2021-12-02 NOTE — H&P ADULT - HISTORY OF PRESENT ILLNESS
61F with a PMHX of colon CA and DM presents c/o abdominal pain with no bowel movements or gas for 3 days. She describes her pain as sharp in nature diffusely with no radiation or noted aggravating/alleviating factors. Reports her last BM/gas was 3 days ago and has had two days of nausea and vomiting. In the ED she is afebrile, mildly tachycardic to 105 (resolved), normotensive, saturating 97% on room air. On exam her abdomen is soft, distended, and tender in the RLQ and umbilical region, no rebound or guarding. Labs significant for WBC of 13.3, lactate normal at 2.0. CT with PO contrast demonstrates

## 2021-12-03 ENCOUNTER — RESULT REVIEW (OUTPATIENT)
Age: 61
End: 2021-12-03

## 2021-12-03 ENCOUNTER — TRANSCRIPTION ENCOUNTER (OUTPATIENT)
Age: 61
End: 2021-12-03

## 2021-12-03 LAB
A1C WITH ESTIMATED AVERAGE GLUCOSE RESULT: 6.6 % — HIGH (ref 4–5.6)
BLD GP AB SCN SERPL QL: SIGNIFICANT CHANGE UP
ESTIMATED AVERAGE GLUCOSE: 143 MG/DL — HIGH (ref 68–114)
GLUCOSE BLDC GLUCOMTR-MCNC: 112 MG/DL — HIGH (ref 70–99)
GLUCOSE BLDC GLUCOMTR-MCNC: 118 MG/DL — HIGH (ref 70–99)
GLUCOSE BLDC GLUCOMTR-MCNC: 139 MG/DL — HIGH (ref 70–99)
GLUCOSE BLDC GLUCOMTR-MCNC: 174 MG/DL — HIGH (ref 70–99)
HCT VFR BLD CALC: 24.8 % — LOW (ref 37–47)
HCV AB S/CO SERPL IA: 0.03 COI — SIGNIFICANT CHANGE UP
HCV AB SERPL-IMP: SIGNIFICANT CHANGE UP
HGB BLD-MCNC: 7.3 G/DL — LOW (ref 12–16)
MCHC RBC-ENTMCNC: 21.7 PG — LOW (ref 27–31)
MCHC RBC-ENTMCNC: 29.4 G/DL — LOW (ref 32–37)
MCV RBC AUTO: 73.6 FL — LOW (ref 81–99)
NRBC # BLD: 0 /100 WBCS — SIGNIFICANT CHANGE UP (ref 0–0)
PLATELET # BLD AUTO: 506 K/UL — HIGH (ref 130–400)
RBC # BLD: 3.37 M/UL — LOW (ref 4.2–5.4)
RBC # FLD: 16.9 % — HIGH (ref 11.5–14.5)
WBC # BLD: 6.64 K/UL — SIGNIFICANT CHANGE UP (ref 4.8–10.8)
WBC # FLD AUTO: 6.64 K/UL — SIGNIFICANT CHANGE UP (ref 4.8–10.8)

## 2021-12-03 PROCEDURE — 88305 TISSUE EXAM BY PATHOLOGIST: CPT | Mod: 26

## 2021-12-03 PROCEDURE — 88341 IMHCHEM/IMCYTCHM EA ADD ANTB: CPT | Mod: 26

## 2021-12-03 PROCEDURE — 45380 COLONOSCOPY AND BIOPSY: CPT | Mod: XU

## 2021-12-03 PROCEDURE — 45389 COLONOSCOPY W/STENT PLCMT: CPT

## 2021-12-03 PROCEDURE — 88342 IMHCHEM/IMCYTCHM 1ST ANTB: CPT | Mod: 26

## 2021-12-03 RX ADMIN — Medication 15 MILLIGRAM(S): at 02:45

## 2021-12-03 RX ADMIN — Medication 1: at 06:28

## 2021-12-03 RX ADMIN — ENOXAPARIN SODIUM 40 MILLIGRAM(S): 100 INJECTION SUBCUTANEOUS at 06:32

## 2021-12-03 RX ADMIN — Medication 15 MILLIGRAM(S): at 02:04

## 2021-12-03 RX ADMIN — PANTOPRAZOLE SODIUM 40 MILLIGRAM(S): 20 TABLET, DELAYED RELEASE ORAL at 13:11

## 2021-12-03 NOTE — CHART NOTE - NSCHARTNOTEFT_GEN_A_CORE
pt s/p flex sig w/ successful colonic stent placement    Impression:    Stool in the whole examined colon.      Mass (20 mm) in the splenic flexure. (Biopsy, Stent Placement 25 x 60 mm Walflex, Injection of Gladis ink).      Plan:  -Keep NPO today  -Serial abdominal exams  -Advance diet to clear liquids when cleared by colorectal surgery  -After discharge, she can be on low fiber foods – e.g. white pasta, mashed potatoes, white soft bread, smooth yoghurt, ice cream (without added fruit or nuts), jellies  -Rest of management per colorectal surgery pt s/p flex sig w/ successful colonic stent placement    Impression:    Stool in the whole examined colon.      Mass (20 mm) in the splenic flexure. (Biopsy, Stent Placement, Injection). Using a balloon catheter, a guidewire was advanced through the stricture traversing it. It was then followed by the balloon catheter and contrast was injected confirming location across the stricture. The balloon was then inflated and pulled back and the known malignant stricture measured around 15-20 mm. A 25 x 60 mm uncovered Walflex metallic stent was then placed successfully over the guidewire across the stricture and stool was noted to flow after placement. Correct positioning was confirmed endoscopically and fluoroscopically and the stent was positioned across the stricture.        Plan:  -Keep NPO today  -Serial abdominal exams  -Advance diet to clear liquids when cleared by colorectal surgery  -After discharge, she can be on low fiber foods – e.g. white pasta, mashed potatoes, white soft bread, smooth yoghurt, ice cream (without added fruit or nuts), jellies  -Rest of management per colorectal surgery

## 2021-12-03 NOTE — PRE-ANESTHESIA EVALUATION ADULT - NSANTHPMHFT_GEN_ALL_CORE
Cantonese speaking  Diabetes for about three years  Decreased tolerance for about 6 months,   New Dx Colon CA and Anemia for 3 months

## 2021-12-03 NOTE — CHART NOTE - NSCHARTNOTEFT_GEN_A_CORE
PACU ANESTHESIA ADMISSION NOTE      Procedure: Colonoscopy bx/stent insertion  Post op diagnosis:  colon obstruction    ____  Intubated  TV:______       Rate: ______      FiO2: ______    _x___  Patent Airway    _x___  Full return of protective reflexes    _x___  Full recovery from anesthesia / back to baseline status    Vitals:    see anesthesia record    Mental Status:  _x___ Awake   _____ Alert   _____ Drowsy   _____ Sedated    Nausea/Vomiting:  _x___  NO       ______Yes,   See Post - Op Orders         Pain Scale (0-10):  _____    Treatment: ____ None    __x__ See Post - Op/PCA Orders    Post - Operative Fluids:   ____ Oral   ___x_ See Post - Op Orders    Plan: Discharge:   ____Home       _____Floor     _____Critical Care    _____  Other:_________________    Comments:  No anesthesia issues or complications noted.  Discharge when criteria met.

## 2021-12-03 NOTE — PROGRESS NOTE ADULT - ASSESSMENT
A/P 61F with a PMHX of colon CA, known mass at the distal transverse colon, and DM presents c/o abdominal pain with no bowel movements or gas for 3 days. admitted for  LBO 2/2 colonic mass.   pt reports + BM   keep NPO, NGT, IVF  f/u GI for colonoscopy today  strict I&O   continue current care

## 2021-12-04 LAB
ABO RH CONFIRMATION: SIGNIFICANT CHANGE UP
ANION GAP SERPL CALC-SCNC: 15 MMOL/L — HIGH (ref 7–14)
ANION GAP SERPL CALC-SCNC: 19 MMOL/L — HIGH (ref 7–14)
BASOPHILS # BLD AUTO: 0.01 K/UL — SIGNIFICANT CHANGE UP (ref 0–0.2)
BASOPHILS # BLD AUTO: 0.01 K/UL — SIGNIFICANT CHANGE UP (ref 0–0.2)
BASOPHILS # BLD AUTO: 0.02 K/UL — SIGNIFICANT CHANGE UP (ref 0–0.2)
BASOPHILS # BLD AUTO: 0.02 K/UL — SIGNIFICANT CHANGE UP (ref 0–0.2)
BASOPHILS NFR BLD AUTO: 0.1 % — SIGNIFICANT CHANGE UP (ref 0–1)
BASOPHILS NFR BLD AUTO: 0.1 % — SIGNIFICANT CHANGE UP (ref 0–1)
BASOPHILS NFR BLD AUTO: 0.3 % — SIGNIFICANT CHANGE UP (ref 0–1)
BASOPHILS NFR BLD AUTO: 0.3 % — SIGNIFICANT CHANGE UP (ref 0–1)
BUN SERPL-MCNC: 13 MG/DL — SIGNIFICANT CHANGE UP (ref 10–20)
BUN SERPL-MCNC: 6 MG/DL — LOW (ref 10–20)
CALCIUM SERPL-MCNC: 7.5 MG/DL — LOW (ref 8.5–10.1)
CALCIUM SERPL-MCNC: 7.8 MG/DL — LOW (ref 8.5–10.1)
CHLORIDE SERPL-SCNC: 103 MMOL/L — SIGNIFICANT CHANGE UP (ref 98–110)
CHLORIDE SERPL-SCNC: 99 MMOL/L — SIGNIFICANT CHANGE UP (ref 98–110)
CO2 SERPL-SCNC: 18 MMOL/L — SIGNIFICANT CHANGE UP (ref 17–32)
CO2 SERPL-SCNC: 19 MMOL/L — SIGNIFICANT CHANGE UP (ref 17–32)
CREAT SERPL-MCNC: 0.5 MG/DL — LOW (ref 0.7–1.5)
CREAT SERPL-MCNC: <0.5 MG/DL — LOW (ref 0.7–1.5)
EOSINOPHIL # BLD AUTO: 0.02 K/UL — SIGNIFICANT CHANGE UP (ref 0–0.7)
EOSINOPHIL # BLD AUTO: 0.02 K/UL — SIGNIFICANT CHANGE UP (ref 0–0.7)
EOSINOPHIL # BLD AUTO: 0.03 K/UL — SIGNIFICANT CHANGE UP (ref 0–0.7)
EOSINOPHIL # BLD AUTO: 0.03 K/UL — SIGNIFICANT CHANGE UP (ref 0–0.7)
EOSINOPHIL NFR BLD AUTO: 0.3 % — SIGNIFICANT CHANGE UP (ref 0–8)
EOSINOPHIL NFR BLD AUTO: 0.3 % — SIGNIFICANT CHANGE UP (ref 0–8)
EOSINOPHIL NFR BLD AUTO: 0.4 % — SIGNIFICANT CHANGE UP (ref 0–8)
EOSINOPHIL NFR BLD AUTO: 0.5 % — SIGNIFICANT CHANGE UP (ref 0–8)
GLUCOSE BLDC GLUCOMTR-MCNC: 102 MG/DL — HIGH (ref 70–99)
GLUCOSE BLDC GLUCOMTR-MCNC: 90 MG/DL — SIGNIFICANT CHANGE UP (ref 70–99)
GLUCOSE BLDC GLUCOMTR-MCNC: 95 MG/DL — SIGNIFICANT CHANGE UP (ref 70–99)
GLUCOSE BLDC GLUCOMTR-MCNC: 97 MG/DL — SIGNIFICANT CHANGE UP (ref 70–99)
GLUCOSE SERPL-MCNC: 102 MG/DL — HIGH (ref 70–99)
GLUCOSE SERPL-MCNC: 78 MG/DL — SIGNIFICANT CHANGE UP (ref 70–99)
HCT VFR BLD CALC: 21.9 % — LOW (ref 37–47)
HCT VFR BLD CALC: 22.6 % — LOW (ref 37–47)
HCT VFR BLD CALC: 27.4 % — LOW (ref 37–47)
HCT VFR BLD CALC: 29.9 % — LOW (ref 37–47)
HGB BLD-MCNC: 6.6 G/DL — CRITICAL LOW (ref 12–16)
HGB BLD-MCNC: 6.6 G/DL — CRITICAL LOW (ref 12–16)
HGB BLD-MCNC: 8.4 G/DL — LOW (ref 12–16)
HGB BLD-MCNC: 9.2 G/DL — LOW (ref 12–16)
IMM GRANULOCYTES NFR BLD AUTO: 0 % — LOW (ref 0.1–0.3)
IMM GRANULOCYTES NFR BLD AUTO: 0.2 % — SIGNIFICANT CHANGE UP (ref 0.1–0.3)
IMM GRANULOCYTES NFR BLD AUTO: 0.3 % — SIGNIFICANT CHANGE UP (ref 0.1–0.3)
IMM GRANULOCYTES NFR BLD AUTO: 0.3 % — SIGNIFICANT CHANGE UP (ref 0.1–0.3)
LYMPHOCYTES # BLD AUTO: 1.76 K/UL — SIGNIFICANT CHANGE UP (ref 1.2–3.4)
LYMPHOCYTES # BLD AUTO: 1.8 K/UL — SIGNIFICANT CHANGE UP (ref 1.2–3.4)
LYMPHOCYTES # BLD AUTO: 1.98 K/UL — SIGNIFICANT CHANGE UP (ref 1.2–3.4)
LYMPHOCYTES # BLD AUTO: 2.09 K/UL — SIGNIFICANT CHANGE UP (ref 1.2–3.4)
LYMPHOCYTES # BLD AUTO: 26.2 % — SIGNIFICANT CHANGE UP (ref 20.5–51.1)
LYMPHOCYTES # BLD AUTO: 27 % — SIGNIFICANT CHANGE UP (ref 20.5–51.1)
LYMPHOCYTES # BLD AUTO: 29.6 % — SIGNIFICANT CHANGE UP (ref 20.5–51.1)
LYMPHOCYTES # BLD AUTO: 32.4 % — SIGNIFICANT CHANGE UP (ref 20.5–51.1)
MAGNESIUM SERPL-MCNC: 1.8 MG/DL — SIGNIFICANT CHANGE UP (ref 1.8–2.4)
MAGNESIUM SERPL-MCNC: 2.1 MG/DL — SIGNIFICANT CHANGE UP (ref 1.8–2.4)
MCHC RBC-ENTMCNC: 21.5 PG — LOW (ref 27–31)
MCHC RBC-ENTMCNC: 21.6 PG — LOW (ref 27–31)
MCHC RBC-ENTMCNC: 23.1 PG — LOW (ref 27–31)
MCHC RBC-ENTMCNC: 23.2 PG — LOW (ref 27–31)
MCHC RBC-ENTMCNC: 29.2 G/DL — LOW (ref 32–37)
MCHC RBC-ENTMCNC: 30.1 G/DL — LOW (ref 32–37)
MCHC RBC-ENTMCNC: 30.7 G/DL — LOW (ref 32–37)
MCHC RBC-ENTMCNC: 30.8 G/DL — LOW (ref 32–37)
MCV RBC AUTO: 71.8 FL — LOW (ref 81–99)
MCV RBC AUTO: 73.6 FL — LOW (ref 81–99)
MCV RBC AUTO: 75.1 FL — LOW (ref 81–99)
MCV RBC AUTO: 75.7 FL — LOW (ref 81–99)
MONOCYTES # BLD AUTO: 0.65 K/UL — HIGH (ref 0.1–0.6)
MONOCYTES # BLD AUTO: 0.66 K/UL — HIGH (ref 0.1–0.6)
MONOCYTES # BLD AUTO: 0.7 K/UL — HIGH (ref 0.1–0.6)
MONOCYTES # BLD AUTO: 0.71 K/UL — HIGH (ref 0.1–0.6)
MONOCYTES NFR BLD AUTO: 11.5 % — HIGH (ref 1.7–9.3)
MONOCYTES NFR BLD AUTO: 12 % — HIGH (ref 1.7–9.3)
MONOCYTES NFR BLD AUTO: 8.5 % — SIGNIFICANT CHANGE UP (ref 1.7–9.3)
MONOCYTES NFR BLD AUTO: 9.5 % — HIGH (ref 1.7–9.3)
NEUTROPHILS # BLD AUTO: 3.38 K/UL — SIGNIFICANT CHANGE UP (ref 1.4–6.5)
NEUTROPHILS # BLD AUTO: 3.42 K/UL — SIGNIFICANT CHANGE UP (ref 1.4–6.5)
NEUTROPHILS # BLD AUTO: 4.36 K/UL — SIGNIFICANT CHANGE UP (ref 1.4–6.5)
NEUTROPHILS # BLD AUTO: 4.95 K/UL — SIGNIFICANT CHANGE UP (ref 1.4–6.5)
NEUTROPHILS NFR BLD AUTO: 55.3 % — SIGNIFICANT CHANGE UP (ref 42.2–75.2)
NEUTROPHILS NFR BLD AUTO: 57.6 % — SIGNIFICANT CHANGE UP (ref 42.2–75.2)
NEUTROPHILS NFR BLD AUTO: 63.5 % — SIGNIFICANT CHANGE UP (ref 42.2–75.2)
NEUTROPHILS NFR BLD AUTO: 63.8 % — SIGNIFICANT CHANGE UP (ref 42.2–75.2)
NRBC # BLD: 0 /100 WBCS — SIGNIFICANT CHANGE UP (ref 0–0)
PHOSPHATE SERPL-MCNC: 2.9 MG/DL — SIGNIFICANT CHANGE UP (ref 2.1–4.9)
PHOSPHATE SERPL-MCNC: 3.4 MG/DL — SIGNIFICANT CHANGE UP (ref 2.1–4.9)
PLATELET # BLD AUTO: 425 K/UL — HIGH (ref 130–400)
PLATELET # BLD AUTO: 455 K/UL — HIGH (ref 130–400)
PLATELET # BLD AUTO: 456 K/UL — HIGH (ref 130–400)
PLATELET # BLD AUTO: 476 K/UL — HIGH (ref 130–400)
POTASSIUM SERPL-MCNC: 3.1 MMOL/L — LOW (ref 3.5–5)
POTASSIUM SERPL-MCNC: 3.8 MMOL/L — SIGNIFICANT CHANGE UP (ref 3.5–5)
POTASSIUM SERPL-SCNC: 3.1 MMOL/L — LOW (ref 3.5–5)
POTASSIUM SERPL-SCNC: 3.8 MMOL/L — SIGNIFICANT CHANGE UP (ref 3.5–5)
RBC # BLD: 3.05 M/UL — LOW (ref 4.2–5.4)
RBC # BLD: 3.07 M/UL — LOW (ref 4.2–5.4)
RBC # BLD: 3.62 M/UL — LOW (ref 4.2–5.4)
RBC # BLD: 3.98 M/UL — LOW (ref 4.2–5.4)
RBC # FLD: 17 % — HIGH (ref 11.5–14.5)
RBC # FLD: 17.2 % — HIGH (ref 11.5–14.5)
RBC # FLD: 18.7 % — HIGH (ref 11.5–14.5)
RBC # FLD: 19.2 % — HIGH (ref 11.5–14.5)
SODIUM SERPL-SCNC: 136 MMOL/L — SIGNIFICANT CHANGE UP (ref 135–146)
SODIUM SERPL-SCNC: 137 MMOL/L — SIGNIFICANT CHANGE UP (ref 135–146)
WBC # BLD: 5.94 K/UL — SIGNIFICANT CHANGE UP (ref 4.8–10.8)
WBC # BLD: 6.11 K/UL — SIGNIFICANT CHANGE UP (ref 4.8–10.8)
WBC # BLD: 6.87 K/UL — SIGNIFICANT CHANGE UP (ref 4.8–10.8)
WBC # BLD: 7.75 K/UL — SIGNIFICANT CHANGE UP (ref 4.8–10.8)
WBC # FLD AUTO: 5.94 K/UL — SIGNIFICANT CHANGE UP (ref 4.8–10.8)
WBC # FLD AUTO: 6.11 K/UL — SIGNIFICANT CHANGE UP (ref 4.8–10.8)
WBC # FLD AUTO: 6.87 K/UL — SIGNIFICANT CHANGE UP (ref 4.8–10.8)
WBC # FLD AUTO: 7.75 K/UL — SIGNIFICANT CHANGE UP (ref 4.8–10.8)

## 2021-12-04 PROCEDURE — 74018 RADEX ABDOMEN 1 VIEW: CPT | Mod: 26

## 2021-12-04 PROCEDURE — 71045 X-RAY EXAM CHEST 1 VIEW: CPT | Mod: 26

## 2021-12-04 PROCEDURE — 99231 SBSQ HOSP IP/OBS SF/LOW 25: CPT | Mod: GC

## 2021-12-04 PROCEDURE — 99232 SBSQ HOSP IP/OBS MODERATE 35: CPT

## 2021-12-04 RX ADMIN — PANTOPRAZOLE SODIUM 40 MILLIGRAM(S): 20 TABLET, DELAYED RELEASE ORAL at 12:53

## 2021-12-04 RX ADMIN — ENOXAPARIN SODIUM 40 MILLIGRAM(S): 100 INJECTION SUBCUTANEOUS at 06:26

## 2021-12-04 RX ADMIN — CHLORHEXIDINE GLUCONATE 1 APPLICATION(S): 213 SOLUTION TOPICAL at 06:27

## 2021-12-04 NOTE — CHART NOTE - NSCHARTNOTEFT_GEN_A_CORE
Hospitalist consult placed for preop clearance  case discussed with team [id number], no definitive date or plans for surgery  please recall once date set for most appropriate clearance/recommendations

## 2021-12-04 NOTE — PROGRESS NOTE ADULT - ASSESSMENT
61F with a PMHX of colon CA ( diagnosed 2 Months ago, by Dr. Donahue after colonoscopy for hematochezia, she was supposed to have surgery by Dr. Becerra this month))  and DM presents c/o abdominal pain with no bowel movements or gas for 3 days. She describes her pain as sharp in nature diffusely with no radiation or noted aggravating/alleviating factors. Reports her last BM/gas was on Monday and has had two days of nausea and vomiting. In the ED she is afebrile, mildly tachycardic to 105 (resolved), normotensive, saturating 97% on room air. On exam her abdomen is soft, distended, and tender in the RLQ and umbilical region, no rebound or guarding. Labs significant for WBC of 13.3, lactate normal at 2.0.    # LBO due to splenic flexure colonic mass s/p colonic stent placement on 12/3  - CT: Distal small bowel and and proximal large bowel obstruction to the level of the splenic flexure where there is a colonic mural nodule measuring 1.2 cm, concerning for primary neoplasm.  - ? liver mets on CT scan   - CF 12/3 ; stool in the colon. Mass (20 mm) in the splenic flexure. (Biopsy, Stent Placement, Injection). Using a balloon catheter, a guidewire was advanced through the stricture traversing it. It was then followed by the balloon catheter and contrast was injected confirming location across the stricture. The balloon was then inflated and pulled back and the known malignant stricture measured around 15-20 mm. A 25 x 60 mm uncovered Walflex metallic stent was then placed successfully over the guidewire across the stricture and stool was noted to flow after placement. Correct positioning was confirmed endoscopically and fluoroscopically and the stent was positioned across the stricture.   - pt is having bms s/p stent placement    Rec:  - advance diet as tolerated  - Serial abdominal exam  -After discharge, she can be on low fiber foods – e.g. white pasta, mashed potatoes, white soft bread, smooth yoghurt, ice cream (without added fruit or nuts), jellies  -Rest of management per colorectal surgery

## 2021-12-04 NOTE — PROGRESS NOTE ADULT - ASSESSMENT
ASSESSMENT:  61F with a PMHX of colon CA, known mass at the distal transverse colon, and DM presents c/o abdominal pain with no bowel movements or gas for 3 days. admitted for  LBO 2/2 colonic mass, now s/p colonic stent with GI on 12/3    -AM KUB  -keep NPO, NGT, IVF  -strict I&O   -Serial abdominal exams  -Transfuse 1u RBC 2/2 Hb 6.6  -AM CBC    Nicolas Concepcion MD  General Surgery PGY-1  BLUE TEAM SPECTRA [id number]

## 2021-12-05 LAB
ANION GAP SERPL CALC-SCNC: 17 MMOL/L — HIGH (ref 7–14)
BASOPHILS # BLD AUTO: 0.01 K/UL — SIGNIFICANT CHANGE UP (ref 0–0.2)
BASOPHILS NFR BLD AUTO: 0.1 % — SIGNIFICANT CHANGE UP (ref 0–1)
BUN SERPL-MCNC: 5 MG/DL — LOW (ref 10–20)
CALCIUM SERPL-MCNC: 8.4 MG/DL — LOW (ref 8.5–10.1)
CHLORIDE SERPL-SCNC: 102 MMOL/L — SIGNIFICANT CHANGE UP (ref 98–110)
CO2 SERPL-SCNC: 18 MMOL/L — SIGNIFICANT CHANGE UP (ref 17–32)
CREAT SERPL-MCNC: <0.5 MG/DL — LOW (ref 0.7–1.5)
EOSINOPHIL # BLD AUTO: 0.07 K/UL — SIGNIFICANT CHANGE UP (ref 0–0.7)
EOSINOPHIL NFR BLD AUTO: 0.8 % — SIGNIFICANT CHANGE UP (ref 0–8)
GLUCOSE BLDC GLUCOMTR-MCNC: 115 MG/DL — HIGH (ref 70–99)
GLUCOSE BLDC GLUCOMTR-MCNC: 84 MG/DL — SIGNIFICANT CHANGE UP (ref 70–99)
GLUCOSE BLDC GLUCOMTR-MCNC: 96 MG/DL — SIGNIFICANT CHANGE UP (ref 70–99)
GLUCOSE SERPL-MCNC: 129 MG/DL — HIGH (ref 70–99)
HCT VFR BLD CALC: 28.3 % — LOW (ref 37–47)
HGB BLD-MCNC: 8.7 G/DL — LOW (ref 12–16)
IMM GRANULOCYTES NFR BLD AUTO: 0.4 % — HIGH (ref 0.1–0.3)
LYMPHOCYTES # BLD AUTO: 1.67 K/UL — SIGNIFICANT CHANGE UP (ref 1.2–3.4)
LYMPHOCYTES # BLD AUTO: 19.7 % — LOW (ref 20.5–51.1)
MAGNESIUM SERPL-MCNC: 2 MG/DL — SIGNIFICANT CHANGE UP (ref 1.8–2.4)
MCHC RBC-ENTMCNC: 23 PG — LOW (ref 27–31)
MCHC RBC-ENTMCNC: 30.7 G/DL — LOW (ref 32–37)
MCV RBC AUTO: 74.9 FL — LOW (ref 81–99)
MONOCYTES # BLD AUTO: 0.72 K/UL — HIGH (ref 0.1–0.6)
MONOCYTES NFR BLD AUTO: 8.5 % — SIGNIFICANT CHANGE UP (ref 1.7–9.3)
NEUTROPHILS # BLD AUTO: 5.98 K/UL — SIGNIFICANT CHANGE UP (ref 1.4–6.5)
NEUTROPHILS NFR BLD AUTO: 70.5 % — SIGNIFICANT CHANGE UP (ref 42.2–75.2)
NRBC # BLD: 0 /100 WBCS — SIGNIFICANT CHANGE UP (ref 0–0)
PHOSPHATE SERPL-MCNC: 3.5 MG/DL — SIGNIFICANT CHANGE UP (ref 2.1–4.9)
PLATELET # BLD AUTO: 437 K/UL — HIGH (ref 130–400)
POTASSIUM SERPL-MCNC: 3.6 MMOL/L — SIGNIFICANT CHANGE UP (ref 3.5–5)
POTASSIUM SERPL-MCNC: 4 MMOL/L — SIGNIFICANT CHANGE UP (ref 3.5–5)
POTASSIUM SERPL-SCNC: 3.6 MMOL/L — SIGNIFICANT CHANGE UP (ref 3.5–5)
POTASSIUM SERPL-SCNC: 4 MMOL/L — SIGNIFICANT CHANGE UP (ref 3.5–5)
RBC # BLD: 3.78 M/UL — LOW (ref 4.2–5.4)
RBC # FLD: 18.7 % — HIGH (ref 11.5–14.5)
SODIUM SERPL-SCNC: 137 MMOL/L — SIGNIFICANT CHANGE UP (ref 135–146)
WBC # BLD: 8.48 K/UL — SIGNIFICANT CHANGE UP (ref 4.8–10.8)
WBC # FLD AUTO: 8.48 K/UL — SIGNIFICANT CHANGE UP (ref 4.8–10.8)

## 2021-12-05 PROCEDURE — 99222 1ST HOSP IP/OBS MODERATE 55: CPT

## 2021-12-05 PROCEDURE — 99231 SBSQ HOSP IP/OBS SF/LOW 25: CPT | Mod: GC

## 2021-12-05 PROCEDURE — 99232 SBSQ HOSP IP/OBS MODERATE 35: CPT

## 2021-12-05 RX ORDER — POLYETHYLENE GLYCOL 3350 17 G/17G
17 POWDER, FOR SOLUTION ORAL EVERY 12 HOURS
Refills: 0 | Status: DISCONTINUED | OUTPATIENT
Start: 2021-12-05 | End: 2021-12-06

## 2021-12-05 RX ORDER — ACETAMINOPHEN 500 MG
650 TABLET ORAL EVERY 6 HOURS
Refills: 0 | Status: DISCONTINUED | OUTPATIENT
Start: 2021-12-05 | End: 2021-12-06

## 2021-12-05 RX ORDER — SODIUM CHLORIDE 9 MG/ML
1000 INJECTION, SOLUTION INTRAVENOUS
Refills: 0 | Status: DISCONTINUED | OUTPATIENT
Start: 2021-12-05 | End: 2021-12-06

## 2021-12-05 RX ORDER — MAGNESIUM SULFATE 500 MG/ML
2 VIAL (ML) INJECTION ONCE
Refills: 0 | Status: COMPLETED | OUTPATIENT
Start: 2021-12-05 | End: 2021-12-05

## 2021-12-05 RX ORDER — POTASSIUM CHLORIDE 20 MEQ
20 PACKET (EA) ORAL
Refills: 0 | Status: COMPLETED | OUTPATIENT
Start: 2021-12-05 | End: 2021-12-05

## 2021-12-05 RX ORDER — PANTOPRAZOLE SODIUM 20 MG/1
40 TABLET, DELAYED RELEASE ORAL
Refills: 0 | Status: DISCONTINUED | OUTPATIENT
Start: 2021-12-05 | End: 2021-12-06

## 2021-12-05 RX ADMIN — CHLORHEXIDINE GLUCONATE 1 APPLICATION(S): 213 SOLUTION TOPICAL at 08:00

## 2021-12-05 RX ADMIN — Medication 50 MILLIEQUIVALENT(S): at 02:53

## 2021-12-05 RX ADMIN — Medication 650 MILLIGRAM(S): at 12:26

## 2021-12-05 RX ADMIN — Medication 650 MILLIGRAM(S): at 23:41

## 2021-12-05 RX ADMIN — POLYETHYLENE GLYCOL 3350 17 GRAM(S): 17 POWDER, FOR SOLUTION ORAL at 17:22

## 2021-12-05 RX ADMIN — Medication 650 MILLIGRAM(S): at 12:56

## 2021-12-05 RX ADMIN — ENOXAPARIN SODIUM 40 MILLIGRAM(S): 100 INJECTION SUBCUTANEOUS at 06:12

## 2021-12-05 RX ADMIN — Medication 50 GRAM(S): at 21:49

## 2021-12-05 RX ADMIN — Medication 50 MILLIEQUIVALENT(S): at 06:11

## 2021-12-05 RX ADMIN — Medication 50 MILLIEQUIVALENT(S): at 04:34

## 2021-12-05 NOTE — CONSULT NOTE ADULT - ASSESSMENT
* SUMMARY: Intermediate risk patient to proceed with intermediate risk procedure.    * Patient-based characteristics (Functional capacity)  Patient is able to achieve more than 4 MET (walk 4 blocks, climb 2 flights of stairs, etc...)          Y [X] / N []    High-risk patient features:  - Recent (<30 days) or active MI          Y [] / N [X]  - Unstable or severe angina          Y [] / N [X]  - Decompensated heart failure, or worsening or new-onset heart failure          Y [] / N [X]  - Severe valvular disease          Y [] / N [X]  - Significant arrhythmia (Tachy- or Bradyarrhythmia)          Y [] / N [X]    * Surgery/Procedure-based characteristics (Type of surgery)  - Low-risk procedure (outpatient procedure, elective, endoscopy, etc...)          Y [] / N []  - Elevated or Moderate-risk procedure (Inpatient)          Y [x] / N []  - High-risk procedure (urgent/emergent procedure, Intrathoracic, vascular, etc...)          Y [] / N []    * Revised Cardiac Risk Index (RCRI)  1- History of ischemic heart disease          Y [] / N [X]  2- History of congestive heart failure          Y [] / N [X]  3- History of stroke/TIA          Y [] / N [X]  4- History of insulin-dependent diabetes          Y [] / N [X]  5- Chronic kidney disease (Cr >2mg/dL)          Y [] / N [X]  6- Undergoing suprainguinal vascular, intraperitoneal, or intrathoracic surgery          Y [x] / N []    Class I risk (Zero factors) --> 3.9% (30-day risk of death, MI, or cardiac arrest)    * IMPRESSION & RECOMMENDATIONS:   Moderate risk patient for a Moderate risk surgery/procedure  No further cardiac work-up is needed at the moment. There are no current cardiac contraindications to prevent from proceeding with the scheduled surgery/procedure.    - This consult serves only as a indigo-operative cardiac risk stratification and evaluation to predict 30-days cardiac complications risk and mortality. The decision to proceed with the surgery/procedure is made by the performing physician and the patient - * SUMMARY: Intermediate risk patient to proceed with intermediate risk procedure.    * Patient-based characteristics (Functional capacity)  Patient is able to achieve more than 4 MET (walk 4 blocks, climb 2 flights of stairs, etc...)          Y [X] / N []    High-risk patient features:  - Recent (<30 days) or active MI          Y [] / N [X]  - Unstable or severe angina          Y [] / N [X]  - Decompensated heart failure, or worsening or new-onset heart failure          Y [] / N [X]  - Severe valvular disease          Y [] / N [X]  - Significant arrhythmia (Tachy- or Bradyarrhythmia)          Y [] / N [X]    * Surgery/Procedure-based characteristics (Type of surgery)  - Low-risk procedure (outpatient procedure, elective, endoscopy, etc...)          Y [] / N []  - Elevated or Moderate-risk procedure (Inpatient)          Y [x] / N []  - High-risk procedure (urgent/emergent procedure, Intrathoracic, vascular, etc...)          Y [] / N []    * Revised Cardiac Risk Index (RCRI)  1- History of ischemic heart disease          Y [] / N [X]  2- History of congestive heart failure          Y [] / N [X]  3- History of stroke/TIA          Y [] / N [X]  4- History of insulin-dependent diabetes          Y [] / N [X]  5- Chronic kidney disease (Cr >2mg/dL)          Y [] / N [X]  6- Undergoing suprainguinal vascular, intraperitoneal, or intrathoracic surgery          Y [x] / N []    Class I risk (Zero factors) --> 3.9% (30-day risk of death, MI, or cardiac arrest)    * IMPRESSION & RECOMMENDATIONS:   Moderate risk patient for a Moderate risk surgery/procedure  No further cardiac work-up is needed at the moment. There are no current cardiac contraindications to prevent from proceeding with the scheduled surgery/procedure.    - This consult serves only as a indigo-operative cardiac risk stratification and evaluation to predict 30-days cardiac complications risk and mortality. The decision to proceed with the surgery/procedure is made by the performing physician and the patient.

## 2021-12-05 NOTE — PROGRESS NOTE ADULT - ASSESSMENT
Patient seen and examined at bedside. NAD.  On Diet, NPO (12-02-21 @ 05:17) [Active]  Patient is hungry and would like to eat.       PLAN:  - advance diet when deemed appropriate by attending  - Monitor vitals  - Monitor labs and replete as necessary  - Monitor for bowel function  - Continue Pain Medications if necessary  - Continue Antibiotics if necessary  - Encourage ambulation as tolerated  - Monitor urine output  - DVT and GI Prophylaxis  - Follow PT/Physiatry if necessary  - surgical planning for colectomy. Cardiology consult placed   - recall medicine when have surgery date for clearance  - Contact social work/case management for necessary patient needs and dispo planning    Lines/Tubes: PIV    BLUE TEAM SPECTRA [id number]

## 2021-12-05 NOTE — CONSULT NOTE ADULT - SUBJECTIVE AND OBJECTIVE BOX
HISTORY OF PRESENT ILLNESS:   61F with a PMHX of colon CA ( diagnosed 2 Months ago, by Dr. Donahue after colonoscopy for hematochezia, she was supposed to have surgery by Dr. Becerra this month))  and DM presents c/o abdominal pain with no bowel movements or gas for 3 days. She describes her pain as sharp in nature diffusely with no radiation or noted aggravating/alleviating factors. Now s/p large colon stent placement for bowel stricture. CT abd reveals splenic flexure mass and hepatic mets? Cardiology consulted for pre-op eval.    PAST MEDICAL & SURGICAL HISTORY      FAMILY HISTORY:  FAMILY HISTORY:      SOCIAL HISTORY:  []smoker  []Alcohol  []Drug    ROS:  Negative except as mentioned in HPI    ALLERGIES:  No Known Allergies      MEDICATIONS:  MEDICATIONS  (STANDING):  acetaminophen     Tablet .. 650 milliGRAM(s) Oral every 6 hours  chlorhexidine 4% Liquid 1 Application(s) Topical <User Schedule>  dextrose 40% Gel 15 Gram(s) Oral once  dextrose 5% + sodium chloride 0.45%. 1000 milliLiter(s) (40 mL/Hr) IV Continuous <Continuous>  dextrose 5%. 1000 milliLiter(s) (50 mL/Hr) IV Continuous <Continuous>  dextrose 50% Injectable 25 Gram(s) IV Push once  enoxaparin Injectable 40 milliGRAM(s) SubCutaneous every 24 hours  glucagon  Injectable 1 milliGRAM(s) IntraMuscular once  insulin lispro (ADMELOG) corrective regimen sliding scale   SubCutaneous every 6 hours  magnesium sulfate  IVPB 2 Gram(s) IV Intermittent once  pantoprazole    Tablet 40 milliGRAM(s) Oral before breakfast  polyethylene glycol 3350 17 Gram(s) Oral every 12 hours    MEDICATIONS  (PRN):  ketorolac   Injectable 15 milliGRAM(s) IV Push every 6 hours PRN Moderate Pain (4 - 6)      HOME MEDICATIONS:  Home Medications:      VITALS:   T(F): 97.8 (12-05 @ 09:59), Max: 100.3 (12-04 @ 13:29)  HR: 87 (12-05 @ 09:59) (61 - 101)  BP: 153/72 (12-05 @ 09:59) (115/68 - 153/72)  BP(mean): 89 (12-04 @ 08:49) (89 - 89)  RR: 18 (12-05 @ 09:59) (18 - 19)  SpO2: 98% (12-05 @ 09:59) (94% - 98%)    I&O's Summary    04 Dec 2021 07:01  -  05 Dec 2021 07:00  --------------------------------------------------------  IN: 0 mL / OUT: 200 mL / NET: -200 mL        PHYSICAL EXAM:  GEN: Not in acute distress  HEENT: NCAT, PERRL, EOMI  LUNGS: Clear to auscultation bilaterally   CARDIOVASCULAR: RRR, S1/S2 present, no murmurs, rubs or gallops, no JVD, + PP bilaterally  ABD: Soft, non-tender, non-distended  EXT: No TOM  SKIN: Intact  NEURO: AAOx3    LABS:                        8.4    6.87  )-----------( 425      ( 04 Dec 2021 20:00 )             27.4     12-05    x   |  x   |  x   ----------------------------<  x   4.0   |  x   |  x     Ca    7.8<L>      04 Dec 2021 20:00  Phos  3.4     12-04  Mg     1.8     12-04          Troponin <0.01, CKMB --, CK --/ 12-01-21 @ 21:15        Hemoglobin A1C   Thyroid    EKG: NSR, TWI in III, AVF nonspecific HISTORY OF PRESENT ILLNESS:     61F with a PMHX of colon CA ( diagnosed 2 Months ago, by Dr. Donahue after colonoscopy for hematochezia, she was supposed to have surgery by Dr. Becerra this month))  and DM presents c/o abdominal pain with no bowel movements or gas for 3 days. She describes her pain as sharp in nature diffusely with no radiation or noted aggravating/alleviating factors. Now s/p large colon stent placement for bowel stricture. CT abd reveals splenic flexure mass and hepatic mets? Cardiology consulted for pre-op eval.    PAST MEDICAL & SURGICAL HISTORY      FAMILY HISTORY:  FAMILY HISTORY:      SOCIAL HISTORY:  []smoker  []Alcohol  []Drug    ROS:  Negative except as mentioned in HPI    ALLERGIES:  No Known Allergies      MEDICATIONS:  MEDICATIONS  (STANDING):  acetaminophen     Tablet .. 650 milliGRAM(s) Oral every 6 hours  chlorhexidine 4% Liquid 1 Application(s) Topical <User Schedule>  dextrose 40% Gel 15 Gram(s) Oral once  dextrose 5% + sodium chloride 0.45%. 1000 milliLiter(s) (40 mL/Hr) IV Continuous <Continuous>  dextrose 5%. 1000 milliLiter(s) (50 mL/Hr) IV Continuous <Continuous>  dextrose 50% Injectable 25 Gram(s) IV Push once  enoxaparin Injectable 40 milliGRAM(s) SubCutaneous every 24 hours  glucagon  Injectable 1 milliGRAM(s) IntraMuscular once  insulin lispro (ADMELOG) corrective regimen sliding scale   SubCutaneous every 6 hours  magnesium sulfate  IVPB 2 Gram(s) IV Intermittent once  pantoprazole    Tablet 40 milliGRAM(s) Oral before breakfast  polyethylene glycol 3350 17 Gram(s) Oral every 12 hours    MEDICATIONS  (PRN):  ketorolac   Injectable 15 milliGRAM(s) IV Push every 6 hours PRN Moderate Pain (4 - 6)      HOME MEDICATIONS:  Home Medications:      VITALS:   T(F): 97.8 (12-05 @ 09:59), Max: 100.3 (12-04 @ 13:29)  HR: 87 (12-05 @ 09:59) (61 - 101)  BP: 153/72 (12-05 @ 09:59) (115/68 - 153/72)  BP(mean): 89 (12-04 @ 08:49) (89 - 89)  RR: 18 (12-05 @ 09:59) (18 - 19)  SpO2: 98% (12-05 @ 09:59) (94% - 98%)    I&O's Summary    04 Dec 2021 07:01  -  05 Dec 2021 07:00  --------------------------------------------------------  IN: 0 mL / OUT: 200 mL / NET: -200 mL        PHYSICAL EXAM:  GEN: Not in acute distress  HEENT: NCAT, PERRL, EOMI  LUNGS: Clear to auscultation bilaterally   CARDIOVASCULAR: RRR, S1/S2 present, no murmurs, rubs or gallops, no JVD, + PP bilaterally  ABD: Soft, non-tender, non-distended  EXT: No TOM  SKIN: Intact  NEURO: AAOx3    LABS:                          8.4    6.87  )-----------( 425      ( 04 Dec 2021 20:00 )             27.4     12-05    x   |  x   |  x   ----------------------------<  x   4.0   |  x   |  x     Ca    7.8<L>      04 Dec 2021 20:00  Phos  3.4     12-04  Mg     1.8     12-04          Troponin <0.01, CKMB --, CK --/ 12-01-21 @ 21:15        Hemoglobin A1C   Thyroid    EKG: NSR, TWI in III, AVF nonspecific

## 2021-12-06 ENCOUNTER — LABORATORY RESULT (OUTPATIENT)
Age: 61
End: 2021-12-06

## 2021-12-06 ENCOUNTER — TRANSCRIPTION ENCOUNTER (OUTPATIENT)
Age: 61
End: 2021-12-06

## 2021-12-06 VITALS
TEMPERATURE: 100 F | DIASTOLIC BLOOD PRESSURE: 68 MMHG | SYSTOLIC BLOOD PRESSURE: 126 MMHG | HEART RATE: 90 BPM | RESPIRATION RATE: 18 BRPM

## 2021-12-06 LAB
GLUCOSE BLDC GLUCOMTR-MCNC: 146 MG/DL — HIGH (ref 70–99)
GLUCOSE BLDC GLUCOMTR-MCNC: 234 MG/DL — HIGH (ref 70–99)
GLUCOSE BLDC GLUCOMTR-MCNC: 75 MG/DL — SIGNIFICANT CHANGE UP (ref 70–99)

## 2021-12-06 PROCEDURE — 99232 SBSQ HOSP IP/OBS MODERATE 35: CPT | Mod: GC

## 2021-12-06 RX ORDER — POLYETHYLENE GLYCOL 3350 17 G/17G
17 POWDER, FOR SOLUTION ORAL
Qty: 0 | Refills: 0 | DISCHARGE
Start: 2021-12-06

## 2021-12-06 RX ORDER — ACETAMINOPHEN 500 MG
2 TABLET ORAL
Qty: 0 | Refills: 0 | DISCHARGE
Start: 2021-12-06

## 2021-12-06 RX ORDER — POTASSIUM CHLORIDE 20 MEQ
20 PACKET (EA) ORAL
Refills: 0 | Status: COMPLETED | OUTPATIENT
Start: 2021-12-06 | End: 2021-12-06

## 2021-12-06 RX ADMIN — POLYETHYLENE GLYCOL 3350 17 GRAM(S): 17 POWDER, FOR SOLUTION ORAL at 05:09

## 2021-12-06 RX ADMIN — ENOXAPARIN SODIUM 40 MILLIGRAM(S): 100 INJECTION SUBCUTANEOUS at 06:10

## 2021-12-06 RX ADMIN — Medication 650 MILLIGRAM(S): at 05:09

## 2021-12-06 RX ADMIN — Medication 50 MILLIEQUIVALENT(S): at 03:18

## 2021-12-06 RX ADMIN — Medication 50 MILLIEQUIVALENT(S): at 05:48

## 2021-12-06 RX ADMIN — Medication 2: at 12:19

## 2021-12-06 RX ADMIN — Medication 650 MILLIGRAM(S): at 11:55

## 2021-12-06 RX ADMIN — PANTOPRAZOLE SODIUM 40 MILLIGRAM(S): 20 TABLET, DELAYED RELEASE ORAL at 05:09

## 2021-12-06 NOTE — DISCHARGE NOTE NURSING/CASE MANAGEMENT/SOCIAL WORK - NSDCPEFALRISK_GEN_ALL_CORE
For information on Fall & Injury Prevention, visit: [email or web address] OR  [email or web address] OR  https://www.cdc.gov/steadi/patient.html

## 2021-12-06 NOTE — PROGRESS NOTE ADULT - REASON FOR ADMISSION
Large bowel obstruction

## 2021-12-06 NOTE — DISCHARGE NOTE PROVIDER - CARE PROVIDER_API CALL
Marcus Becerra)  ColonRectal Surgery; Surgery  [address], [address] [place] [address]  Phone: [phone]  Fax: [phone]  Scheduled Appointment: 12/08/2021

## 2021-12-06 NOTE — PROGRESS NOTE ADULT - ATTENDING COMMENTS
62 yo f with large bowel obstruction s/p stent.  see above for details  Plan surgical followup
Patient is a 61F with PMH colon CA, schedule for OR on 12/16 and DM who presents with abdominal pain, constipation, nausea and vomiting.  Found to have LBO on CTAP.       Patient seen and examined.  Reports mild abdominal pain.  Patient reports flatus, small BM    Abdomen - soft, mild TTP, mild distention.  No rebound or guarding    Vitals labs and images reviewed    CTAP - Distal small bowel and and proximal large bowel obstruction to the level of the splenic flexure where there is a colonic mural nodule measuring 1.2 cm, concerning for primary neoplasm. Direct visualization with colonoscopy is suggested.    Plan  - NPO  - IVF  - NGT  - GI for colonic stent placement  - GI DVT PPX
Patient is a 61F with PMH colon CA, schedule for OR on 12/16 and DM who presents with abdominal pain, constipation, nausea and vomiting.  Found to have LBO on CTAP.   s/p colonic stent placement    Patient seen and examined.  Patient reports improved abdominal pain.  Flatus and BM.    Abdomen - soft, mild TTP, mild distention.  No rebound or guarding    Vitals labs and images reviewed    CTAP - Distal small bowel and and proximal large bowel obstruction to the level of the splenic flexure where there is a colonic mural nodule measuring 1.2 cm, concerning for primary neoplasm. Direct visualization with colonoscopy is suggested.    Plan  - DC NGT  - CLD  - Miralax daily  - IVF  - 1 Unit RBC for Hb 6.6  - GI DVT PPX  - medical risk assessment  - plan to advance diet, DC home and proceed with surgery on 12/16
Patient is a 61F with PMH colon CA, schedule for OR on 12/16 and DM who presents with abdominal pain, constipation, nausea and vomiting.  Found to have LBO on CTAP.   s/p colonic stent placement    Patient seen and examined.  Patient reports improved abdominal pain.  Flatus and BM.    Abdomen - soft, mild TTP, mild distention.  No rebound or guarding    Vitals labs and images reviewed    CTAP - Distal small bowel and and proximal large bowel obstruction to the level of the splenic flexure where there is a colonic mural nodule measuring 1.2 cm, concerning for primary neoplasm. Direct visualization with colonoscopy is suggested.    Plan  - Advance to FLD only  - Miralax daily  - IVF  - GI DVT PPX  - medical risk assessment  - possible DC home in 24 hours  - Plan to proceed with surgery on 12/16
Patient is a 61F with PMH colon CA, schedule for OR on 12/16 and DM who presents with abdominal pain, constipation, nausea and vomiting.  Found to have LBO on CTAP.   s/p colonic stent placement    Patient seen and examined.  Patient reports improved abdominal pain.  Flatus and BM.    Abdomen - soft, mild TTP, mild distention.  No rebound or guarding    Vitals labs and images reviewed    CTAP - Distal small bowel and and proximal large bowel obstruction to the level of the splenic flexure where there is a colonic mural nodule measuring 1.2 cm, concerning for primary neoplasm. Direct visualization with colonoscopy is suggested.    Plan  - Advance to FLD only  - Miralax daily  - IVF  - GI DVT PPX  - medical risk assessment  - DC home today  - patient to follow up on 12/8 in the office  - Plan to proceed with surgery on 12/16

## 2021-12-06 NOTE — PROGRESS NOTE ADULT - SUBJECTIVE AND OBJECTIVE BOX
Patient is a 60 y/o female with History of recently diagnosed Colonic Mass, DM who presented with LBO. Patient had Flex Sigmoidoscopy with placement of a uncovered metallic stent crossing Splenic flexure mass. Patient feels better, less distension, passing gas and moving bowels. Patient scheduled for surgical intervention 12/16.      PAST MEDICAL & SURGICAL HISTORY:  DM  Colonic Mass      MEDICATIONS  (STANDING):  chlorhexidine 4% Liquid 1 Application(s) Topical <User Schedule>  dextrose 40% Gel 15 Gram(s) Oral once  dextrose 5%. 1000 milliLiter(s) (50 mL/Hr) IV Continuous <Continuous>  dextrose 50% Injectable 25 Gram(s) IV Push once  enoxaparin Injectable 40 milliGRAM(s) SubCutaneous every 24 hours  glucagon  Injectable 1 milliGRAM(s) IntraMuscular once  insulin lispro (ADMELOG) corrective regimen sliding scale   SubCutaneous every 6 hours  lactated ringers. 1000 milliLiter(s) (100 mL/Hr) IV Continuous <Continuous>  pantoprazole  Injectable 40 milliGRAM(s) IV Push daily    MEDICATIONS  (PRN):  ketorolac   Injectable 15 milliGRAM(s) IV Push every 6 hours PRN Moderate Pain (4 - 6)      Allergies  No Known Allergies      Review of Systems  General:  Denies Fatigue, Denies Fever, Denies Weakness ,Denies Weight Loss   HEENT: Denies Trouble Swallowing ,Denies  Sore Throat , Denies Change in hearing/vision/speech ,Denies Dizziness    Cardio: Denies  Chest Pain , Palpitations    Respiratory: Denies worsening of SOB, Denies Cough  Abdomen: See detailed HPI  Neuro: Denies Headache Denies Dizziness, Denies Paresthesias  MSK: Denies pain in Bones/Joints/Muscles   Psych: Patient denies depression, denies suicidal or homicidal ideations  Integ: Patient Denies rash, or new skin lesions         Vital Signs   T(F): 96.8 (06 Dec 2021 08:41), Max: 99.3 (05 Dec 2021 21:58)  HR: 77 (06 Dec 2021 08:41) (77 - 99)  BP: 114/60 (06 Dec 2021 08:41) (114/60 - 153/72)  RR: 18 (06 Dec 2021 08:41) (18 - 18)  SpO2: 96% (06 Dec 2021 04:59) (96% - 98%)  GENERAL: AAOx3, no acute distress.  HEAD:  Atraumatic, Normocephalic  EYES: conjunctiva and sclera clear  NECK: Supple, no JVD or thyromegaly  CHEST/LUNG: Clear to auscultation bilaterally; No wheeze, rhonchi, or rales  HEART: Regular rate and rhythm; normal S1, S2, No murmurs.  ABDOMEN: Soft, nontender, nondistended; Bowel sounds present  NEUROLOGY: No asterixis or tremor.   SKIN: Intact, no jaundice       Labs                        8.7    8.48  )-----------( 437      ( 05 Dec 2021 20:27 )             28.3     
Procedure/diagnosis LBO       PAST MEDICAL & SURGICAL HISTORY:  hx of colon cancer and DM    24H EVENTS    Vital Signs Last 24 Hrs  T(C): 37.4 (03 Dec 2021 09:57), Max: 37.6 (02 Dec 2021 21:03)  T(F): 99.3 (03 Dec 2021 09:57), Max: 99.6 (02 Dec 2021 21:03)  HR: 92 (03 Dec 2021 09:57) (92 - 101)  BP: 138/65 (03 Dec 2021 09:57) (123/62 - 149/66)  BP(mean): --  RR: 18 (03 Dec 2021 09:57) (18 - 19)  SpO2: 98% (03 Dec 2021 09:57) (94% - 98%)        I&O's Detail    02 Dec 2021 07:01  -  03 Dec 2021 07:00  --------------------------------------------------------  IN:  Total IN: 0 mL    OUT:    Nasogastric/Oral tube (mL): 210 mL    Voided (mL): 480 mL  Total OUT: 690 mL    Total NET: -690 mL      03 Dec 2021 07:01  -  03 Dec 2021 11:52  --------------------------------------------------------  IN:  Total IN: 0 mL    OUT:    Voided (mL): 350 mL  Total OUT: 350 mL    Total NET: -350 mL      Urinalysis Basic - ( 01 Dec 2021 21:15 )    Color: Yellow / Appearance: Clear / S.022 / pH: x  Gluc: x / Ketone: Large  / Bili: Negative / Urobili: <2 mg/dL   Blood: x / Protein: 30 mg/dL / Nitrite: Negative   Leuk Esterase: Negative / RBC: 3 /HPF / WBC 2 /HPF   Sq Epi: x / Non Sq Epi: 1 /HPF / Bacteria: Negative                   7.3    6.64  )-----------( 506      (  @ 05:38 )             24.8                7.3    6.28  )-----------( 495      (  @ 20:00 )             24.9                8.3    13.38 )-----------( 601      (  @ 21:15 )             27.9                    137   |  102   |  22                 Ca: 8.4    BMP:   ----------------------------< 141    M.5   (21 @ 20:00)             4.1    |  19    | 0.6                Ph: 3.8      LFT:     TPro: 7.2 / Alb: 4.3 / TBili: 0.6 / DBili: x / AST: 19 / ALT: 19 / AlkPhos: 117   (21 @ 21:15)      CARDIAC MARKERS ( 01 Dec 2021 21:15 )  x     / <0.01 ng/mL / x     / x     / x            MEDICATIONS  (STANDING):  chlorhexidine 4% Liquid 1 Application(s) Topical <User Schedule>  dextrose 40% Gel 15 Gram(s) Oral once  dextrose 5%. 1000 milliLiter(s) (50 mL/Hr) IV Continuous <Continuous>  dextrose 50% Injectable 25 Gram(s) IV Push once  enoxaparin Injectable 40 milliGRAM(s) SubCutaneous every 24 hours  glucagon  Injectable 1 milliGRAM(s) IntraMuscular once  insulin lispro (ADMELOG) corrective regimen sliding scale   SubCutaneous every 6 hours  lactated ringers. 1000 milliLiter(s) (100 mL/Hr) IV Continuous <Continuous>  pantoprazole  Injectable 40 milliGRAM(s) IV Push daily    MEDICATIONS  (PRN):  ketorolac   Injectable 15 milliGRAM(s) IV Push every 6 hours PRN Moderate Pain (4 - 6)      Diet, NPO (21 @ 05:17)      PHYSICAL EXAM:  General Appearance: pt in  NAD, NGT in place  Chest: Equal expansion bilaterally, equal breath sounds  CV: S1, S2, RRR  Abdomen: Soft , distended + mild tenderness on palp @ RLQ area                  no rebound tenderness, no guarding   Extremities: no calf tenderness  Neuro: A&Ox3      
Gastroenterology progress note:     Patient is a 61y old  Female who presents with a chief complaint of Large bowel obstruction (04 Dec 2021 03:26)       Admitted on: 12-02-21    We are following the patient for:  LBO       Interval History:    No acute events overnight.   - Diet - npo  - last BM - x2 this morning  - Abdominal pain - minimal      PAST MEDICAL & SURGICAL HISTORY:      MEDICATIONS  (STANDING):  chlorhexidine 4% Liquid 1 Application(s) Topical <User Schedule>  dextrose 40% Gel 15 Gram(s) Oral once  dextrose 5%. 1000 milliLiter(s) (50 mL/Hr) IV Continuous <Continuous>  dextrose 50% Injectable 25 Gram(s) IV Push once  enoxaparin Injectable 40 milliGRAM(s) SubCutaneous every 24 hours  glucagon  Injectable 1 milliGRAM(s) IntraMuscular once  insulin lispro (ADMELOG) corrective regimen sliding scale   SubCutaneous every 6 hours  lactated ringers. 1000 milliLiter(s) (100 mL/Hr) IV Continuous <Continuous>  pantoprazole  Injectable 40 milliGRAM(s) IV Push daily    MEDICATIONS  (PRN):  ketorolac   Injectable 15 milliGRAM(s) IV Push every 6 hours PRN Moderate Pain (4 - 6)      Allergies  No Known Allergies      Review of Systems:   Cardiovascular:  No Chest Pain, No Palpitations  Respiratory:  No Cough, No Dyspnea  Gastrointestinal:  As described in HPI  Skin:  No Skin Lesions, No Jaundice  Neuro:  No Syncope, No Dizziness    Physical Examination:  T(C): 37.1 (12-04-21 @ 08:49), Max: 37.3 (12-04-21 @ 00:57)  HR: 85 (12-04-21 @ 08:49) (82 - 98)  BP: 132/62 (12-04-21 @ 08:49) (123/66 - 141/75)  RR: 18 (12-04-21 @ 08:49) (18 - 18)  SpO2: 94% (12-04-21 @ 08:49) (94% - 96%)  Weight (kg): 54 (12-03-21 @ 17:30)    12-03-21 @ 07:01  -  12-04-21 @ 07:00  --------------------------------------------------------  IN: 900 mL / OUT: 850 mL / NET: 50 mL        GENERAL: AAOx3, no acute distress.  HEAD:  Atraumatic, Normocephalic  EYES: conjunctiva and sclera clear  NECK: Supple, no JVD or thyromegaly  CHEST/LUNG: Clear to auscultation bilaterally; No wheeze, rhonchi, or rales  HEART: Regular rate and rhythm; normal S1, S2, No murmurs.  ABDOMEN: Soft, nontender, nondistended; Bowel sounds present  NEUROLOGY: No asterixis or tremor.   SKIN: Intact, no jaundice     Data:                        6.6    6.11  )-----------( 456      ( 04 Dec 2021 01:46 )             21.9     Hgb trend:  6.6  12-04-21 @ 01:46  6.6  12-03-21 @ 20:00  7.3  12-03-21 @ 05:38  7.3  12-02-21 @ 20:00  8.3  12-01-21 @ 21:15        12-03    137  |  103  |  13  ----------------------------<  102<H>  3.8   |  19  |  0.5<L>    Ca    7.5<L>      03 Dec 2021 20:00  Phos  2.9     12-03  Mg     2.1     12-03      Liver panel trend:  TBili 0.6   /   AST 19   /   ALT 19   /   AlkP 117   /   Tptn 7.2   /   Alb 4.3    /   DBili --      12-01             Radiology:      
COLORECTAL SURGERY PROGRESS NOTE    Events of past 24 hours:   Patient s/p colonic stent placement by GI team in PM 12/3.  Patient tolerated procedure well and in NAD when seen overnight, denies pain, -Flatus, -BM, remains NPO with NGT in place and minimal to no output.  Denies chest pain, SOB, nausea, or any other symptoms.  Abdomen soft, nontender, nondistended.  PM Hb returned as 6.6, STAT repeat showing the same, patient afebrile, HR 80-90s, normotensive.  1uRBC ordered and pending at this time.    Vitals:   T(F): 99.2 (12-04-21 @ 00:57), Max: 99.3 (12-03-21 @ 09:57)  HR: 96 (12-04-21 @ 00:57) (82 - 98)  BP: 133/61 (12-04-21 @ 00:57) (133/61 - 143/67)  RR: 18 (12-04-21 @ 00:57)  SpO2: 94% (12-04-21 @ 00:57) (94% - 98%)      Diet, NPO      Fluids:     I & O's:    12-02-21 @ 07:01  -  12-03-21 @ 07:00  --------------------------------------------------------  IN:  Total IN: 0 mL    OUT:    Nasogastric/Oral tube (mL): 210 mL    Voided (mL): 480 mL  Total OUT: 690 mL    Total NET: -690 mL            PHYSICAL EXAM:  General Appearance: NAD  Heart: RR  Lungs: Unlabored breathing at rest  Abdomen:  Soft, nontender, nondistended. No guarding or rebound.  MSK/Extremities: Warm & well-perfused.   Skin: Warm, dry. No jaundice.       MEDICATIONS  (STANDING):  chlorhexidine 4% Liquid 1 Application(s) Topical <User Schedule>  dextrose 40% Gel 15 Gram(s) Oral once  dextrose 5%. 1000 milliLiter(s) (50 mL/Hr) IV Continuous <Continuous>  dextrose 50% Injectable 25 Gram(s) IV Push once  enoxaparin Injectable 40 milliGRAM(s) SubCutaneous every 24 hours  glucagon  Injectable 1 milliGRAM(s) IntraMuscular once  insulin lispro (ADMELOG) corrective regimen sliding scale   SubCutaneous every 6 hours  lactated ringers. 1000 milliLiter(s) (100 mL/Hr) IV Continuous <Continuous>  pantoprazole  Injectable 40 milliGRAM(s) IV Push daily    MEDICATIONS  (PRN):  ketorolac   Injectable 15 milliGRAM(s) IV Push every 6 hours PRN Moderate Pain (4 - 6)        LAB/STUDIES:                          6.6    6.11  )-----------( 456      ( 04 Dec 2021 01:46 )             21.9       12-03    137  |  103  |  13  ----------------------------<  102<H>  3.8   |  19  |  0.5<L>    Ca    7.5<L>      03 Dec 2021 20:00  Phos  2.9     12-03  Mg     2.1     12-03                        Lactate Trend  12-02 @ 20:00 Lactate:1.3   12-01 @ 21:15 Lactate:2.0             CAPILLARY BLOOD GLUCOSE      POCT Blood Glucose.: 102 mg/dL (04 Dec 2021 00:23)    
GENERAL SURGERY PROGRESS NOTE    Patient: WILFREDO SARKAR , 61y ([birth year])Female   MRN: [id number]  Location: [address]  Visit: 12-02-21 Inpatient  Date: 12-06-21 @ 09:02    Hospital Day #:5    Procedure/Dx/Injuries: LBO    Events of past 24 hours: Pt seen and examined at bedside. Tolerating FLD, +flatus, -BM. Cardiology evaluated for risk stratifications. No acute overnight events. Afebrile and vitals are stable    PAST MEDICAL & SURGICAL HISTORY:    Vitals:   T(F): 96.8 (12-06-21 @ 08:41), Max: 99.3 (12-05-21 @ 21:58)  HR: 77 (12-06-21 @ 08:41)  BP: 114/60 (12-06-21 @ 08:41)  RR: 18 (12-06-21 @ 08:41)  SpO2: 96% (12-06-21 @ 04:59)    Diet, Full Liquid    PHYSICAL EXAM:  General: NAD  HEENT: NCAT, EOMI  Cardiac: RRR S1, S2  Respiratory: CTAB, normal respiratory effort  Abdomen: Soft, non-distended, non-tender, no rebound, no guarding  Skin: Warm/dry, normal color, no jaundice    MEDICATIONS  (STANDING):  acetaminophen     Tablet .. 650 milliGRAM(s) Oral every 6 hours  chlorhexidine 4% Liquid 1 Application(s) Topical <User Schedule>  dextrose 40% Gel 15 Gram(s) Oral once  dextrose 5% + sodium chloride 0.45%. 1000 milliLiter(s) (40 mL/Hr) IV Continuous <Continuous>  dextrose 5%. 1000 milliLiter(s) (50 mL/Hr) IV Continuous <Continuous>  dextrose 50% Injectable 25 Gram(s) IV Push once  enoxaparin Injectable 40 milliGRAM(s) SubCutaneous every 24 hours  glucagon  Injectable 1 milliGRAM(s) IntraMuscular once  insulin lispro (ADMELOG) corrective regimen sliding scale   SubCutaneous every 6 hours  pantoprazole    Tablet 40 milliGRAM(s) Oral before breakfast  polyethylene glycol 3350 17 Gram(s) Oral every 12 hours    MEDICATIONS  (PRN):  ketorolac   Injectable 15 milliGRAM(s) IV Push every 6 hours PRN Moderate Pain (4 - 6)    DVT PROPHYLAXIS: enoxaparin Injectable 40 milliGRAM(s) SubCutaneous every 24 hours  GI PROPHYLAXIS: pantoprazole    Tablet 40 milliGRAM(s) Oral before breakfast    LAB/STUDIES:  Labs:  CAPILLARY BLOOD GLUCOSE  POCT Blood Glucose.: 146 mg/dL (06 Dec 2021 04:29)  POCT Blood Glucose.: 115 mg/dL (05 Dec 2021 23:54)  POCT Blood Glucose.: 96 mg/dL (05 Dec 2021 12:17)                        8.7    8.48  )-----------( 437      ( 05 Dec 2021 20:27 )             28.3       Auto Neutrophil %: 70.5 % (12-05-21 @ 20:27)  Auto Immature Granulocyte %: 0.4 % (12-05-21 @ 20:27)    12-05    137  |  102  |  5<L>  ----------------------------<  129<H>  3.6   |  18  |  <0.5<L>    Calcium, Total Serum: 8.4 mg/dL (12-05-21 @ 20:27)    IMAGING:  No new imaging past 24hrs         
GENERAL SURGERY PROGRESS NOTE    Patient: WILFREDO SARKAR , 61y ([birth year])Female   MRN: [id number]  Location: [address]  Visit: 12-02-21 Inpatient  Date: 12-05-21 @ 06:07        Admitted :12-02-21 (3d)  LOS: 3d    Procedure/Dx/Injuries: Small bowel obstruction      Events of past 24 hours: Patient seen and examined at bedside. No acute events overnight. Afebrile, VSS. NGT removed yesterday patient hungry, would like to eat, +BM/g, +ambulating. Electrolytes repleted. Information obtained via [employer]  Lily # [id number]    >>> <<<>>> <<<>>> <<<>>> <<<>>> <<<>>> <<<>>> <<<>>> <<<>>> <<<>>> <<<  Vitals:   T(F): 98.4 (12-05-21 @ 05:00), Max: 100.3 (12-04-21 @ 13:29)  HR: 84 (12-05-21 @ 05:00)  BP: 125/61 (12-05-21 @ 05:00)  RR: 18 (12-05-21 @ 05:00)  SpO2: 96% (12-05-21 @ 05:00)      PHYSICAL EXAM:  General Appearance: NAD  HEENT: Normocephalic, atraumatic, trachea midline  Heart: s1, s2,    Lungs: No increased work of breathing or accessory muscle use. Symmetric chest wall rise and fall. Bilateral breath sounds  Abdomen:  Soft, nontender, nondistended. No rebound or guarding.  MSK/Extremities: Warm & well-perfused.   Skin: Warm, dry. No jaundice.   >>> <<<>>> <<<>>> <<<>>> <<<>>> <<<>>> <<<>>> <<<>>> <<<>>> <<<>>> <<<  Diet, NPO      Fluids:     I & O's:    12-03-21 @ 07:01  -  12-04-21 @ 07:00  --------------------------------------------------------  IN:    Lactated Ringers: 900 mL  Total IN: 900 mL    OUT:    Nasogastric/Oral tube (mL): 150 mL    Voided (mL): 700 mL  Total OUT: 850 mL    Total NET: 50 mL    >>> <<<>>> <<<>>> <<<>>> <<<>>> <<<>>> <<<>>> <<<>>> <<<>>> <<<>>> <<<  MEDICATIONS  (STANDING):  chlorhexidine 4% Liquid 1 Application(s) Topical <User Schedule>  dextrose 40% Gel 15 Gram(s) Oral once  dextrose 5%. 1000 milliLiter(s) (50 mL/Hr) IV Continuous <Continuous>  dextrose 50% Injectable 25 Gram(s) IV Push once  enoxaparin Injectable 40 milliGRAM(s) SubCutaneous every 24 hours  glucagon  Injectable 1 milliGRAM(s) IntraMuscular once  insulin lispro (ADMELOG) corrective regimen sliding scale   SubCutaneous every 6 hours  lactated ringers. 1000 milliLiter(s) (100 mL/Hr) IV Continuous <Continuous>  magnesium sulfate  IVPB 2 Gram(s) IV Intermittent once  pantoprazole  Injectable 40 milliGRAM(s) IV Push daily  potassium chloride  20 mEq/100 mL IVPB 20 milliEquivalent(s) IV Intermittent every 2 hours    MEDICATIONS  (PRN):  ketorolac   Injectable 15 milliGRAM(s) IV Push every 6 hours PRN Moderate Pain (4 - 6)      DVT PROPHYLAXIS: enoxaparin Injectable 40 milliGRAM(s) SubCutaneous every 24 hours    GI PROPHYLAXIS: pantoprazole  Injectable 40 milliGRAM(s) IV Push daily    >>> <<<>>> <<<>>> <<<>>> <<<>>> <<<>>> <<<>>> <<<>>> <<<>>> <<<>>> <<<  LAB/STUDIES:  CAPILLARY BLOOD GLUCOSE  POCT Blood Glucose.: 84 mg/dL (05 Dec 2021 00:00)  POCT Blood Glucose.: 95 mg/dL (04 Dec 2021 17:50)  POCT Blood Glucose.: 90 mg/dL (04 Dec 2021 12:00)  POCT Blood Glucose.: 97 mg/dL (04 Dec 2021 06:24)                          8.4    6.87  )-----------( 425      ( 04 Dec 2021 20:00 )             27.4       Auto Neutrophil %: 63.5 % (12-04-21 @ 20:00)  Auto Immature Granulocyte %: 0.3 % (12-04-21 @ 20:00)  Auto Neutrophil %: 63.8 % (12-04-21 @ 11:39)  Auto Immature Granulocyte %: 0.3 % (12-04-21 @ 11:39)    12-04    136  |  99  |  6<L>  ----------------------------<  78  3.1<L>   |  18  |  <0.5<L>    Calcium, Total Serum: 7.8 mg/dL (12-04-21 @ 20:00)     Lactate, Blood: 1.3 mmol/L (12-02-21 @ 20:00)    >>> <<<>>> <<<>>> <<<>>> <<<>>> <<<>>> <<<>>> <<<>>> <<<>>> <<<>>> <<<  IMAGING:  < from: Xray Chest 1 View- PORTABLE-Urgent (Xray Chest 1 View- PORTABLE-Urgent .) (12.04.21 @ 06:34) >  Impression:  Advanced enteric tube terminating within the stomach.  Increased bibasilar opacities.  < end of copied text >    >>> <<<>>> <<<>>> <<<>>> <<<>>> <<<>>> <<<>>> <<<>>> <<<>>> <<<>>> <<<  ACCESS/ DEVICES:  [x] Peripheral IV

## 2021-12-06 NOTE — PROGRESS NOTE ADULT - ASSESSMENT
ASSESSMENT:  61F with a PMHX of colon CA, known mass at the distal transverse colon, and DM presents c/o abdominal pain with no bowel movements or gas for 3 days. admitted for LBO 2/2 colonic mass, now s/p colonic stent with GI on 12/3    PLAN:  -FLD  -Monitor bowel function   -Monitor vitals  -Monitor labs and replete as necessary  -Continue Pain Medications if necessary  -Encourage ambulation as tolerated  -DVT and GI Prophylaxis  -Tentative surgery on 12/16/21  -Cardiology: nod risk  -Recall medicine for risk stratification   -Dispo planning     BLUE TEAM SPECTRA: [id number] ASSESSMENT:  61F with a PMHX of colon CA, known mass at the distal transverse colon, and DM presents c/o abdominal pain with no bowel movements or gas for 3 days. admitted for LBO 2/2 colonic mass, now s/p colonic stent with GI on 12/3    PLAN:  -FLD  -Monitor bowel function   -Monitor vitals  -Monitor labs and replete as necessary  -Continue Pain Medications if necessary  -Encourage ambulation as tolerated  -DVT and GI Prophylaxis  -Operative planning   -Cardiology: mod risk  -Recall medicine for risk stratification   -Dispo planning     BLUE TEAM SPECTRA: [id number]

## 2021-12-06 NOTE — DISCHARGE NOTE PROVIDER - NSDCFUSCHEDAPPT_GEN_ALL_CORE_FT
WILFREDO SARKAR ; 12/08/2021 ; [facility] [address] WILFREDO [address]ian ; [facility]/2022 ; [facility] [address]

## 2021-12-06 NOTE — DISCHARGE NOTE PROVIDER - HOSPITAL COURSE
This is a 61 year old female with a PMH of colon cancer & DM who presented with abdominal pain on 12/2 and was found to have a large bowel obstruction secondary to colonic mass. Patient made NPO, an NGT was placed, and GI was consulted for possible stenting. GI recommended a hay drip enema and plan for colonoscopy on 12/3.    12/3:  -NPO, IVFs, keep NGT.  -s/p flexible sigmoidoscopy with colonic stent placement. Findings significant for mass (20mm) in the splenic flexure which was biopsied. A guidewire was advanced through the stricture. It was then followed by the balloon catheter and contrast was injected confirming location across the stricture. The balloon was then inflated and pulled back and the known malignant stricture measured around 15-20 mm. A 25 x 60 mm uncovered Walflex metallic stent was then placed successfully over the guidewire across the stricture and stool was noted to flow after placement. Correct positioning was confirmed endoscopically and fluoroscopically and the stent was positioned across the stricture.    12/4:  -NGT removed, advanced to a clear liquid diet.  -Miralax daily.  -Patient noted to be anemic and transfused for Hgb 6.6    12/5:  -Advanced to full liquid diet.  -Miralax daily.  -Cardiology risk stratification noted to be moderate risk.    12/6:  -Patient tolerating full liquid diet.  -Passing flatus, ambulating without issue and voiding independently.  -Patient stable for discharge today.

## 2021-12-06 NOTE — DISCHARGE NOTE PROVIDER - NSDCCPCAREPLAN_GEN_ALL_CORE_FT
PRINCIPAL DISCHARGE DIAGNOSIS  Diagnosis: Large bowel obstruction  Assessment and Plan of Treatment: secondary to colonic mass s/p colonoscopy with stenting.  -Diet: Continue full liquid diet as tolerated. Please avoid roughage like beef for now and instead stick to softer foods including chicken, fish, and pastas.  -Activity: Avoid heavy lifting or straining (anything over 10-15 pounds) for at least 6 weeks. You may ambulate and otherwise resume normal daily activities as tolerated. Please take home your incentive spirometer and continue to use 10x/hour while awake.  -Medications: You may resume your home medications. You may take Tylenol 650mg every 6 hours as needed for pain. Please also take over the counter Miralax daily.  -Follow-up: Please call the office to schedule a follow-up appointment with Dr. Becerra on Wednesday, 12/8, with plans for surgery on 12/16.  -Please call the office or return to the ED with persistent fever greater than 100.4F, chest pain, shortness of breath, uncontrollable nausea/vomiting/abdominal pain, constipation, bloody bowel movements, abdominal distention, inability to tolerate oral intake.      SECONDARY DISCHARGE DIAGNOSES  Diagnosis: Colon cancer  Assessment and Plan of Treatment:     Diagnosis: Large bowel obstruction  Assessment and Plan of Treatment:

## 2021-12-06 NOTE — PROGRESS NOTE ADULT - ASSESSMENT
Patient is a 62 y/o female with History of recently diagnosed Colonic Mass, DM who presented with LBO. Patient had Flex Sigmoidoscopy with placement of a uncovered metallic stent crossing Splenic flexure mass. Patient feels better, less distension, passing gas and moving bowels. Patient scheduled for surgical intervention 12/16. Agree with Miralax daily, can titrate as needed. Low fiber foods and continue to follow closely with colo-Rectal.    LBO/ Splenic Mass- S/P Colonic stent   - Advance diet as tolerated- low fiber  - Passing stool and gas  - Miralax QD may titrate as needed  - Patient has date for surgery 12/16  - Continue to follow Dalton-Rectal  - Recall Advanced GI as needed

## 2021-12-06 NOTE — DISCHARGE NOTE PROVIDER - NSDCMRMEDTOKEN_GEN_ALL_CORE_FT
acetaminophen 325 mg oral tablet: 2 tab(s) orally every 6 hours  polyethylene glycol 3350 oral powder for reconstitution: 17 gram(s) orally every 12 hours

## 2021-12-07 LAB — SURGICAL PATHOLOGY STUDY: SIGNIFICANT CHANGE UP

## 2021-12-08 ENCOUNTER — APPOINTMENT (OUTPATIENT)
Dept: SURGERY | Facility: CLINIC | Age: 61
End: 2021-12-08
Payer: MEDICAID

## 2021-12-08 VITALS
HEART RATE: 105 BPM | SYSTOLIC BLOOD PRESSURE: 130 MMHG | DIASTOLIC BLOOD PRESSURE: 72 MMHG | WEIGHT: 116 LBS | BODY MASS INDEX: 21.9 KG/M2 | HEIGHT: 61 IN | TEMPERATURE: 96.6 F

## 2021-12-08 PROCEDURE — 99214 OFFICE O/P EST MOD 30 MIN: CPT

## 2021-12-09 ENCOUNTER — OUTPATIENT (OUTPATIENT)
Dept: OUTPATIENT SERVICES | Facility: HOSPITAL | Age: 61
LOS: 1 days | Discharge: HOME | End: 2021-12-09
Payer: MEDICAID

## 2021-12-09 VITALS
DIASTOLIC BLOOD PRESSURE: 76 MMHG | HEART RATE: 76 BPM | OXYGEN SATURATION: 98 % | HEIGHT: 62 IN | SYSTOLIC BLOOD PRESSURE: 142 MMHG | WEIGHT: 115.08 LBS | RESPIRATION RATE: 16 BRPM | TEMPERATURE: 98 F

## 2021-12-09 DIAGNOSIS — C18.4 MALIGNANT NEOPLASM OF TRANSVERSE COLON: ICD-10-CM

## 2021-12-09 DIAGNOSIS — Z01.818 ENCOUNTER FOR OTHER PREPROCEDURAL EXAMINATION: ICD-10-CM

## 2021-12-09 DIAGNOSIS — Z98.890 OTHER SPECIFIED POSTPROCEDURAL STATES: Chronic | ICD-10-CM

## 2021-12-09 LAB
ANION GAP SERPL CALC-SCNC: 17 MMOL/L — HIGH (ref 7–14)
APPEARANCE UR: CLEAR — SIGNIFICANT CHANGE UP
APTT BLD: 29.3 SEC — SIGNIFICANT CHANGE UP (ref 27–39.2)
BASOPHILS # BLD AUTO: 0.04 K/UL — SIGNIFICANT CHANGE UP (ref 0–0.2)
BASOPHILS NFR BLD AUTO: 0.4 % — SIGNIFICANT CHANGE UP (ref 0–1)
BILIRUB UR-MCNC: NEGATIVE — SIGNIFICANT CHANGE UP
BLD GP AB SCN SERPL QL: SIGNIFICANT CHANGE UP
BUN SERPL-MCNC: 12 MG/DL — SIGNIFICANT CHANGE UP (ref 10–20)
CALCIUM SERPL-MCNC: 9.1 MG/DL — SIGNIFICANT CHANGE UP (ref 8.5–10.1)
CHLORIDE SERPL-SCNC: 101 MMOL/L — SIGNIFICANT CHANGE UP (ref 98–110)
CO2 SERPL-SCNC: 22 MMOL/L — SIGNIFICANT CHANGE UP (ref 17–32)
COLOR SPEC: COLORLESS — SIGNIFICANT CHANGE UP
CREAT SERPL-MCNC: 0.6 MG/DL — LOW (ref 0.7–1.5)
DIFF PNL FLD: NEGATIVE — SIGNIFICANT CHANGE UP
EOSINOPHIL # BLD AUTO: 0.15 K/UL — SIGNIFICANT CHANGE UP (ref 0–0.7)
EOSINOPHIL NFR BLD AUTO: 1.5 % — SIGNIFICANT CHANGE UP (ref 0–8)
GLUCOSE SERPL-MCNC: 104 MG/DL — HIGH (ref 70–99)
GLUCOSE UR QL: NEGATIVE — SIGNIFICANT CHANGE UP
HCT VFR BLD CALC: 30.7 % — LOW (ref 37–47)
HGB BLD-MCNC: 9.1 G/DL — LOW (ref 12–16)
IMM GRANULOCYTES NFR BLD AUTO: 0.3 % — SIGNIFICANT CHANGE UP (ref 0.1–0.3)
INR BLD: 0.97 RATIO — SIGNIFICANT CHANGE UP (ref 0.65–1.3)
KETONES UR-MCNC: SIGNIFICANT CHANGE UP
LEUKOCYTE ESTERASE UR-ACNC: NEGATIVE — SIGNIFICANT CHANGE UP
LYMPHOCYTES # BLD AUTO: 3.2 K/UL — SIGNIFICANT CHANGE UP (ref 1.2–3.4)
LYMPHOCYTES # BLD AUTO: 31.4 % — SIGNIFICANT CHANGE UP (ref 20.5–51.1)
MCHC RBC-ENTMCNC: 22.7 PG — LOW (ref 27–31)
MCHC RBC-ENTMCNC: 29.6 G/DL — LOW (ref 32–37)
MCV RBC AUTO: 76.6 FL — LOW (ref 81–99)
MONOCYTES # BLD AUTO: 0.74 K/UL — HIGH (ref 0.1–0.6)
MONOCYTES NFR BLD AUTO: 7.3 % — SIGNIFICANT CHANGE UP (ref 1.7–9.3)
MRSA PCR RESULT.: NEGATIVE — SIGNIFICANT CHANGE UP
NEUTROPHILS # BLD AUTO: 6.02 K/UL — SIGNIFICANT CHANGE UP (ref 1.4–6.5)
NEUTROPHILS NFR BLD AUTO: 59.1 % — SIGNIFICANT CHANGE UP (ref 42.2–75.2)
NITRITE UR-MCNC: NEGATIVE — SIGNIFICANT CHANGE UP
NRBC # BLD: 0 /100 WBCS — SIGNIFICANT CHANGE UP (ref 0–0)
PH UR: 6 — SIGNIFICANT CHANGE UP (ref 5–8)
PLATELET # BLD AUTO: 524 K/UL — HIGH (ref 130–400)
POTASSIUM SERPL-MCNC: 4.1 MMOL/L — SIGNIFICANT CHANGE UP (ref 3.5–5)
POTASSIUM SERPL-SCNC: 4.1 MMOL/L — SIGNIFICANT CHANGE UP (ref 3.5–5)
PROT UR-MCNC: NEGATIVE — SIGNIFICANT CHANGE UP
PROTHROM AB SERPL-ACNC: 11.2 SEC — SIGNIFICANT CHANGE UP (ref 9.95–12.87)
RBC # BLD: 4.01 M/UL — LOW (ref 4.2–5.4)
RBC # FLD: 19.7 % — HIGH (ref 11.5–14.5)
SODIUM SERPL-SCNC: 140 MMOL/L — SIGNIFICANT CHANGE UP (ref 135–146)
SP GR SPEC: 1.01 — SIGNIFICANT CHANGE UP (ref 1.01–1.03)
UROBILINOGEN FLD QL: SIGNIFICANT CHANGE UP
WBC # BLD: 10.18 K/UL — SIGNIFICANT CHANGE UP (ref 4.8–10.8)
WBC # FLD AUTO: 10.18 K/UL — SIGNIFICANT CHANGE UP (ref 4.8–10.8)

## 2021-12-09 PROCEDURE — 93010 ELECTROCARDIOGRAM REPORT: CPT

## 2021-12-09 NOTE — H&P PST ADULT - HISTORY OF PRESENT ILLNESS
61 yr old female states was admitted Dec 2021 for N/V ABD pain -wk up included colonoscopy -was diagnosed with Colon cancer -is scheduled for Robotic splenic flexure resection. Cantonese speaking  ID [id number][Language line solutions] was used for the entire visit. Denies COVID S/S. Recd vaccine . Verbalized understanding of COVID prevention measures. Exercise brandin-  Anesthesia Alert  NO--Difficult Airway  NO--History of neck surgery or radiation  NO--Limited ROM of neck  NO--History of Malignant hyperthermia  NO--Personal or family history of Pseudocholinesterase deficiency  NO--Prior Anesthesia Complication  NO--Latex Allergy  NO--Loose teeth  NO--History of Rheumatoid Arthritis  NO--MELISSA  No Bleeding risk  NO--Other_____  61 yr old female states was admitted Dec 2021 for vomitting and  ABD pain for a 1 week -wk up included colonoscopy -was diagnosed with Colon cancer -is scheduled for Robotic splenic flexure resection. Cantonese speaking - ID [id number][Language line solutions] was used for the entire visit. Denies COVID S/S. Recd vaccine . Verbalized understanding of COVID prevention measures. Exercise brandin- 1 FOS slowly LTD by abd pain.  Anesthesia Alert  NO--Difficult Airway  NO--History of neck surgery or radiation  NO--Limited ROM of neck  NO--History of Malignant hyperthermia  NO--Personal or family history of Pseudocholinesterase deficiency  NO--Prior Anesthesia Complication  NO--Latex Allergy  NO--Loose teeth  NO--History of Rheumatoid Arthritis  NO--MELISSA  No Bleeding risk  NO--Other_____

## 2021-12-10 DIAGNOSIS — E11.9 TYPE 2 DIABETES MELLITUS WITHOUT COMPLICATIONS: ICD-10-CM

## 2021-12-10 DIAGNOSIS — K59.00 CONSTIPATION, UNSPECIFIED: ICD-10-CM

## 2021-12-10 DIAGNOSIS — Z20.822 CONTACT WITH AND (SUSPECTED) EXPOSURE TO COVID-19: ICD-10-CM

## 2021-12-10 DIAGNOSIS — C18.9 MALIGNANT NEOPLASM OF COLON, UNSPECIFIED: ICD-10-CM

## 2021-12-10 DIAGNOSIS — K56.609 UNSPECIFIED INTESTINAL OBSTRUCTION, UNSPECIFIED AS TO PARTIAL VERSUS COMPLETE OBSTRUCTION: ICD-10-CM

## 2021-12-13 ENCOUNTER — LABORATORY RESULT (OUTPATIENT)
Age: 61
End: 2021-12-13

## 2021-12-14 NOTE — HISTORY OF PRESENT ILLNESS
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  Patient underwent colonoscopy on 10/12/21 with Dr. Hart that showed an endoscopically obstructing proximal transverse colon mass.  This was biopsy proven adenocarcinoma.  CT Chest abdomen and pelvis showed 5mm RLL lobe and a circumferential mass in the distal transverse colon and multiple liver cysts.  CEA was 31.4. MRI of the liver which was negative for metastatic disease. She was recently admitted due to obstruction and underwent stent placement. She is currently tolerating a liquid diet and has 5-6 loose BM daily.  Patient denies fevers, chills, nausea, vomiting, abdominal pain, constipation, diarrhea, blood in his stool or unexpected weight loss.  Patient denies a family history of colon cancer rectal cancer or inflammatory bowel disease. \par

## 2021-12-14 NOTE — PHYSICAL EXAM
[Abdomen Masses] : No abdominal masses [Abdomen Tenderness] : ~T No ~M abdominal tenderness [No HSM] : no hepatosplenomegaly [Respiratory Effort] : normal respiratory effort [Normal Rate and Rhythm] : normal rate and rhythm [de-identified] : awake, alert and in no acute distress

## 2021-12-15 PROBLEM — E11.9 TYPE 2 DIABETES MELLITUS WITHOUT COMPLICATIONS: Chronic | Status: ACTIVE | Noted: 2021-12-09

## 2021-12-15 PROBLEM — C18.9 MALIGNANT NEOPLASM OF COLON, UNSPECIFIED: Chronic | Status: ACTIVE | Noted: 2021-12-09

## 2021-12-15 NOTE — ASU PATIENT PROFILE, ADULT - FALL HARM RISK - UNIVERSAL INTERVENTIONS
Bed in lowest position, wheels locked, appropriate side rails in place/Call bell, personal items and telephone in reach/Instruct patient to call for assistance before getting out of bed or chair/Non-slip footwear when patient is out of bed/[place] to call system/Physically safe environment - no spills, clutter or unnecessary equipment/Purposeful Proactive Rounding/Room/bathroom lighting operational, light cord in reach

## 2021-12-16 ENCOUNTER — INPATIENT (INPATIENT)
Facility: HOSPITAL | Age: 61
LOS: 2 days | Discharge: HOME HEALTH | End: 2021-12-19
Attending: COLON & RECTAL SURGERY | Admitting: COLON & RECTAL SURGERY
Payer: MEDICAID

## 2021-12-16 ENCOUNTER — APPOINTMENT (OUTPATIENT)
Dept: SURGERY | Facility: HOSPITAL | Age: 61
End: 2021-12-16

## 2021-12-16 ENCOUNTER — RESULT REVIEW (OUTPATIENT)
Age: 61
End: 2021-12-16

## 2021-12-16 VITALS
TEMPERATURE: 98 F | DIASTOLIC BLOOD PRESSURE: 65 MMHG | HEIGHT: 61 IN | SYSTOLIC BLOOD PRESSURE: 114 MMHG | WEIGHT: 113.98 LBS | RESPIRATION RATE: 16 BRPM | HEART RATE: 88 BPM | OXYGEN SATURATION: 100 %

## 2021-12-16 DIAGNOSIS — Z98.890 OTHER SPECIFIED POSTPROCEDURAL STATES: Chronic | ICD-10-CM

## 2021-12-16 LAB
ANION GAP SERPL CALC-SCNC: 19 MMOL/L — HIGH (ref 7–14)
BASOPHILS # BLD AUTO: 0.01 K/UL — SIGNIFICANT CHANGE UP (ref 0–0.2)
BASOPHILS NFR BLD AUTO: 0.1 % — SIGNIFICANT CHANGE UP (ref 0–1)
BLD GP AB SCN SERPL QL: SIGNIFICANT CHANGE UP
BUN SERPL-MCNC: 9 MG/DL — LOW (ref 10–20)
CALCIUM SERPL-MCNC: 8.5 MG/DL — SIGNIFICANT CHANGE UP (ref 8.5–10.1)
CHLORIDE SERPL-SCNC: 105 MMOL/L — SIGNIFICANT CHANGE UP (ref 98–110)
CO2 SERPL-SCNC: 17 MMOL/L — SIGNIFICANT CHANGE UP (ref 17–32)
CREAT SERPL-MCNC: 0.7 MG/DL — SIGNIFICANT CHANGE UP (ref 0.7–1.5)
EOSINOPHIL # BLD AUTO: 0 K/UL — SIGNIFICANT CHANGE UP (ref 0–0.7)
EOSINOPHIL NFR BLD AUTO: 0 % — SIGNIFICANT CHANGE UP (ref 0–8)
GLUCOSE BLDC GLUCOMTR-MCNC: 119 MG/DL — HIGH (ref 70–99)
GLUCOSE BLDC GLUCOMTR-MCNC: 135 MG/DL — HIGH (ref 70–99)
GLUCOSE BLDC GLUCOMTR-MCNC: 143 MG/DL — HIGH (ref 70–99)
GLUCOSE BLDC GLUCOMTR-MCNC: 171 MG/DL — HIGH (ref 70–99)
GLUCOSE BLDC GLUCOMTR-MCNC: 185 MG/DL — HIGH (ref 70–99)
GLUCOSE SERPL-MCNC: 132 MG/DL — HIGH (ref 70–99)
HCT VFR BLD CALC: 28.2 % — LOW (ref 37–47)
HGB BLD-MCNC: 8.5 G/DL — LOW (ref 12–16)
IMM GRANULOCYTES NFR BLD AUTO: 0.2 % — SIGNIFICANT CHANGE UP (ref 0.1–0.3)
LYMPHOCYTES # BLD AUTO: 1.73 K/UL — SIGNIFICANT CHANGE UP (ref 1.2–3.4)
LYMPHOCYTES # BLD AUTO: 21.5 % — SIGNIFICANT CHANGE UP (ref 20.5–51.1)
MAGNESIUM SERPL-MCNC: 1.8 MG/DL — SIGNIFICANT CHANGE UP (ref 1.8–2.4)
MCHC RBC-ENTMCNC: 22.7 PG — LOW (ref 27–31)
MCHC RBC-ENTMCNC: 30.1 G/DL — LOW (ref 32–37)
MCV RBC AUTO: 75.2 FL — LOW (ref 81–99)
MONOCYTES # BLD AUTO: 0.47 K/UL — SIGNIFICANT CHANGE UP (ref 0.1–0.6)
MONOCYTES NFR BLD AUTO: 5.9 % — SIGNIFICANT CHANGE UP (ref 1.7–9.3)
NEUTROPHILS # BLD AUTO: 5.8 K/UL — SIGNIFICANT CHANGE UP (ref 1.4–6.5)
NEUTROPHILS NFR BLD AUTO: 72.3 % — SIGNIFICANT CHANGE UP (ref 42.2–75.2)
NRBC # BLD: 0 /100 WBCS — SIGNIFICANT CHANGE UP (ref 0–0)
PHOSPHATE SERPL-MCNC: 4.1 MG/DL — SIGNIFICANT CHANGE UP (ref 2.1–4.9)
PLATELET # BLD AUTO: 597 K/UL — HIGH (ref 130–400)
POTASSIUM SERPL-MCNC: 4.6 MMOL/L — SIGNIFICANT CHANGE UP (ref 3.5–5)
POTASSIUM SERPL-SCNC: 4.6 MMOL/L — SIGNIFICANT CHANGE UP (ref 3.5–5)
RBC # BLD: 3.75 M/UL — LOW (ref 4.2–5.4)
RBC # FLD: 20.2 % — HIGH (ref 11.5–14.5)
SODIUM SERPL-SCNC: 141 MMOL/L — SIGNIFICANT CHANGE UP (ref 135–146)
WBC # BLD: 8.03 K/UL — SIGNIFICANT CHANGE UP (ref 4.8–10.8)
WBC # FLD AUTO: 8.03 K/UL — SIGNIFICANT CHANGE UP (ref 4.8–10.8)

## 2021-12-16 PROCEDURE — 44213 LAP MOBIL SPLENIC FL ADD-ON: CPT

## 2021-12-16 PROCEDURE — S2900 ROBOTIC SURGICAL SYSTEM: CPT | Mod: NC

## 2021-12-16 PROCEDURE — 88309 TISSUE EXAM BY PATHOLOGIST: CPT | Mod: 26

## 2021-12-16 PROCEDURE — 44204 LAPARO PARTIAL COLECTOMY: CPT

## 2021-12-16 RX ORDER — KETOROLAC TROMETHAMINE 30 MG/ML
30 SYRINGE (ML) INJECTION ONCE
Refills: 0 | Status: DISCONTINUED | OUTPATIENT
Start: 2021-12-16 | End: 2021-12-16

## 2021-12-16 RX ORDER — KETOROLAC TROMETHAMINE 30 MG/ML
30 SYRINGE (ML) INJECTION EVERY 6 HOURS
Refills: 0 | Status: DISCONTINUED | OUTPATIENT
Start: 2021-12-16 | End: 2021-12-17

## 2021-12-16 RX ORDER — SODIUM CHLORIDE 9 MG/ML
1000 INJECTION, SOLUTION INTRAVENOUS
Refills: 0 | Status: DISCONTINUED | OUTPATIENT
Start: 2021-12-16 | End: 2021-12-16

## 2021-12-16 RX ORDER — HEPARIN SODIUM 5000 [USP'U]/ML
5000 INJECTION INTRAVENOUS; SUBCUTANEOUS ONCE
Refills: 0 | Status: COMPLETED | OUTPATIENT
Start: 2021-12-16 | End: 2021-12-16

## 2021-12-16 RX ORDER — GABAPENTIN 400 MG/1
300 CAPSULE ORAL ONCE
Refills: 0 | Status: COMPLETED | OUTPATIENT
Start: 2021-12-16 | End: 2021-12-16

## 2021-12-16 RX ORDER — ACETAMINOPHEN 500 MG
1000 TABLET ORAL ONCE
Refills: 0 | Status: DISCONTINUED | OUTPATIENT
Start: 2021-12-16 | End: 2021-12-16

## 2021-12-16 RX ORDER — PANTOPRAZOLE SODIUM 20 MG/1
40 TABLET, DELAYED RELEASE ORAL
Refills: 0 | Status: DISCONTINUED | OUTPATIENT
Start: 2021-12-16 | End: 2021-12-19

## 2021-12-16 RX ORDER — INSULIN LISPRO 100/ML
VIAL (ML) SUBCUTANEOUS
Refills: 0 | Status: DISCONTINUED | OUTPATIENT
Start: 2021-12-16 | End: 2021-12-19

## 2021-12-16 RX ORDER — ACETAMINOPHEN 500 MG
650 TABLET ORAL EVERY 6 HOURS
Refills: 0 | Status: DISCONTINUED | OUTPATIENT
Start: 2021-12-16 | End: 2021-12-19

## 2021-12-16 RX ORDER — HYDROMORPHONE HYDROCHLORIDE 2 MG/ML
1 INJECTION INTRAMUSCULAR; INTRAVENOUS; SUBCUTANEOUS
Refills: 0 | Status: DISCONTINUED | OUTPATIENT
Start: 2021-12-16 | End: 2021-12-16

## 2021-12-16 RX ORDER — HYDROMORPHONE HYDROCHLORIDE 2 MG/ML
0.5 INJECTION INTRAMUSCULAR; INTRAVENOUS; SUBCUTANEOUS
Refills: 0 | Status: DISCONTINUED | OUTPATIENT
Start: 2021-12-16 | End: 2021-12-16

## 2021-12-16 RX ORDER — SODIUM CHLORIDE 9 MG/ML
1000 INJECTION, SOLUTION INTRAVENOUS
Refills: 0 | Status: DISCONTINUED | OUTPATIENT
Start: 2021-12-16 | End: 2021-12-18

## 2021-12-16 RX ORDER — METFORMIN HYDROCHLORIDE 850 MG/1
1 TABLET ORAL
Qty: 0 | Refills: 0 | DISCHARGE

## 2021-12-16 RX ORDER — ENOXAPARIN SODIUM 100 MG/ML
40 INJECTION SUBCUTANEOUS DAILY
Refills: 0 | Status: DISCONTINUED | OUTPATIENT
Start: 2021-12-16 | End: 2021-12-19

## 2021-12-16 RX ORDER — HYDROMORPHONE HYDROCHLORIDE 2 MG/ML
1 INJECTION INTRAMUSCULAR; INTRAVENOUS; SUBCUTANEOUS EVERY 6 HOURS
Refills: 0 | Status: DISCONTINUED | OUTPATIENT
Start: 2021-12-16 | End: 2021-12-19

## 2021-12-16 RX ORDER — MAGNESIUM SULFATE 500 MG/ML
2 VIAL (ML) INJECTION ONCE
Refills: 0 | Status: COMPLETED | OUTPATIENT
Start: 2021-12-16 | End: 2021-12-16

## 2021-12-16 RX ORDER — CEFAZOLIN SODIUM 1 G
2000 VIAL (EA) INJECTION EVERY 8 HOURS
Refills: 0 | Status: COMPLETED | OUTPATIENT
Start: 2021-12-16 | End: 2021-12-17

## 2021-12-16 RX ORDER — MEPERIDINE HYDROCHLORIDE 50 MG/ML
12.5 INJECTION INTRAMUSCULAR; INTRAVENOUS; SUBCUTANEOUS
Refills: 0 | Status: DISCONTINUED | OUTPATIENT
Start: 2021-12-16 | End: 2021-12-16

## 2021-12-16 RX ORDER — ONDANSETRON 8 MG/1
4 TABLET, FILM COATED ORAL ONCE
Refills: 0 | Status: DISCONTINUED | OUTPATIENT
Start: 2021-12-16 | End: 2021-12-16

## 2021-12-16 RX ORDER — BUPIVACAINE 13.3 MG/ML
20 INJECTION, SUSPENSION, LIPOSOMAL INFILTRATION ONCE
Refills: 0 | Status: DISCONTINUED | OUTPATIENT
Start: 2021-12-16 | End: 2021-12-16

## 2021-12-16 RX ADMIN — Medication 2: at 16:04

## 2021-12-16 RX ADMIN — Medication 650 MILLIGRAM(S): at 17:18

## 2021-12-16 RX ADMIN — Medication 100 MILLIGRAM(S): at 21:19

## 2021-12-16 RX ADMIN — Medication 650 MILLIGRAM(S): at 23:27

## 2021-12-16 RX ADMIN — ENOXAPARIN SODIUM 40 MILLIGRAM(S): 100 INJECTION SUBCUTANEOUS at 16:00

## 2021-12-16 RX ADMIN — Medication 100 MILLIGRAM(S): at 14:55

## 2021-12-16 RX ADMIN — HEPARIN SODIUM 5000 UNIT(S): 5000 INJECTION INTRAVENOUS; SUBCUTANEOUS at 07:37

## 2021-12-16 RX ADMIN — GABAPENTIN 300 MILLIGRAM(S): 400 CAPSULE ORAL at 07:37

## 2021-12-16 RX ADMIN — SODIUM CHLORIDE 100 MILLILITER(S): 9 INJECTION, SOLUTION INTRAVENOUS at 17:04

## 2021-12-16 NOTE — BRIEF OPERATIVE NOTE - NSICDXBRIEFPROCEDURE_GEN_ALL_CORE_FT
PROCEDURES:  Robot-assisted partial colectomy with mobilization of splenic flexure 16-Dec-2021 12:59:09  Leonila Dent

## 2021-12-16 NOTE — BRIEF OPERATIVE NOTE - OPERATION/FINDINGS
Robotic assisted splenic flexure resection for colonic adenocarcinoma. Transverse to sigmoid colon anastomosis. Side to side, isoperistaltic, stapled anastomosis. ICG confirmation of viable anastomosis.

## 2021-12-17 LAB
ANION GAP SERPL CALC-SCNC: 13 MMOL/L — SIGNIFICANT CHANGE UP (ref 7–14)
BASOPHILS # BLD AUTO: 0.06 K/UL — SIGNIFICANT CHANGE UP (ref 0–0.2)
BASOPHILS NFR BLD AUTO: 0.9 % — SIGNIFICANT CHANGE UP (ref 0–1)
BUN SERPL-MCNC: 5 MG/DL — LOW (ref 10–20)
CALCIUM SERPL-MCNC: 8.1 MG/DL — LOW (ref 8.5–10.1)
CHLORIDE SERPL-SCNC: 105 MMOL/L — SIGNIFICANT CHANGE UP (ref 98–110)
CO2 SERPL-SCNC: 21 MMOL/L — SIGNIFICANT CHANGE UP (ref 17–32)
CREAT SERPL-MCNC: 0.6 MG/DL — LOW (ref 0.7–1.5)
EOSINOPHIL # BLD AUTO: 0.01 K/UL — SIGNIFICANT CHANGE UP (ref 0–0.7)
EOSINOPHIL NFR BLD AUTO: 0.1 % — SIGNIFICANT CHANGE UP (ref 0–8)
GLUCOSE BLDC GLUCOMTR-MCNC: 100 MG/DL — HIGH (ref 70–99)
GLUCOSE BLDC GLUCOMTR-MCNC: 129 MG/DL — HIGH (ref 70–99)
GLUCOSE BLDC GLUCOMTR-MCNC: 157 MG/DL — HIGH (ref 70–99)
GLUCOSE BLDC GLUCOMTR-MCNC: 92 MG/DL — SIGNIFICANT CHANGE UP (ref 70–99)
GLUCOSE BLDC GLUCOMTR-MCNC: 98 MG/DL — SIGNIFICANT CHANGE UP (ref 70–99)
GLUCOSE SERPL-MCNC: 91 MG/DL — SIGNIFICANT CHANGE UP (ref 70–99)
HCT VFR BLD CALC: 26.6 % — LOW (ref 37–47)
HGB BLD-MCNC: 8 G/DL — LOW (ref 12–16)
IMM GRANULOCYTES NFR BLD AUTO: 0.1 % — SIGNIFICANT CHANGE UP (ref 0.1–0.3)
LYMPHOCYTES # BLD AUTO: 2.79 K/UL — SIGNIFICANT CHANGE UP (ref 1.2–3.4)
LYMPHOCYTES # BLD AUTO: 39.9 % — SIGNIFICANT CHANGE UP (ref 20.5–51.1)
MAGNESIUM SERPL-MCNC: 2 MG/DL — SIGNIFICANT CHANGE UP (ref 1.8–2.4)
MCHC RBC-ENTMCNC: 22.5 PG — LOW (ref 27–31)
MCHC RBC-ENTMCNC: 30.1 G/DL — LOW (ref 32–37)
MCV RBC AUTO: 74.9 FL — LOW (ref 81–99)
MONOCYTES # BLD AUTO: 0.51 K/UL — SIGNIFICANT CHANGE UP (ref 0.1–0.6)
MONOCYTES NFR BLD AUTO: 7.3 % — SIGNIFICANT CHANGE UP (ref 1.7–9.3)
NEUTROPHILS # BLD AUTO: 3.61 K/UL — SIGNIFICANT CHANGE UP (ref 1.4–6.5)
NEUTROPHILS NFR BLD AUTO: 51.7 % — SIGNIFICANT CHANGE UP (ref 42.2–75.2)
NRBC # BLD: 0 /100 WBCS — SIGNIFICANT CHANGE UP (ref 0–0)
PHOSPHATE SERPL-MCNC: 2.4 MG/DL — SIGNIFICANT CHANGE UP (ref 2.1–4.9)
PLATELET # BLD AUTO: 571 K/UL — HIGH (ref 130–400)
POTASSIUM SERPL-MCNC: 3.8 MMOL/L — SIGNIFICANT CHANGE UP (ref 3.5–5)
POTASSIUM SERPL-SCNC: 3.8 MMOL/L — SIGNIFICANT CHANGE UP (ref 3.5–5)
RBC # BLD: 3.55 M/UL — LOW (ref 4.2–5.4)
RBC # FLD: 20.2 % — HIGH (ref 11.5–14.5)
SODIUM SERPL-SCNC: 139 MMOL/L — SIGNIFICANT CHANGE UP (ref 135–146)
WBC # BLD: 6.99 K/UL — SIGNIFICANT CHANGE UP (ref 4.8–10.8)
WBC # FLD AUTO: 6.99 K/UL — SIGNIFICANT CHANGE UP (ref 4.8–10.8)

## 2021-12-17 RX ADMIN — PANTOPRAZOLE SODIUM 40 MILLIGRAM(S): 20 TABLET, DELAYED RELEASE ORAL at 05:10

## 2021-12-17 RX ADMIN — Medication 100 MILLIGRAM(S): at 05:06

## 2021-12-17 RX ADMIN — Medication 25 GRAM(S): at 00:39

## 2021-12-17 RX ADMIN — Medication 650 MILLIGRAM(S): at 00:25

## 2021-12-17 RX ADMIN — Medication 650 MILLIGRAM(S): at 11:14

## 2021-12-17 RX ADMIN — Medication 30 MILLIGRAM(S): at 06:44

## 2021-12-17 RX ADMIN — Medication 650 MILLIGRAM(S): at 18:33

## 2021-12-17 RX ADMIN — Medication 650 MILLIGRAM(S): at 05:07

## 2021-12-17 RX ADMIN — Medication 650 MILLIGRAM(S): at 11:11

## 2021-12-17 RX ADMIN — Medication 650 MILLIGRAM(S): at 17:11

## 2021-12-17 RX ADMIN — ENOXAPARIN SODIUM 40 MILLIGRAM(S): 100 INJECTION SUBCUTANEOUS at 11:11

## 2021-12-17 RX ADMIN — Medication 650 MILLIGRAM(S): at 23:15

## 2021-12-17 NOTE — CHART NOTE - NSCHARTNOTEFT_GEN_A_CORE
Post Operative Note  Patient: WILFREDO SARKAR 61y (1960) Female   MRN: [id number]  Location: [address]  Visit: 12-16-21 Inpatient  Date: 12-16-21 @ 17:24    Procedure: Colon cancer     S/P Robot-assisted splenic flexure resection with B/L TAP blocks    Subjective: Patient is doing well postoperatively.   Nausea: no, Vomiting: no, Ambulating: no, Flatus: no  Pain Assessment: Patient is complaining of mild pain that is appropriate for post-operative course.     Objective:  Vitals: T(F): 97.9 (12-16-21 @ 12:52), Max: 98.1 (12-16-21 @ 07:05)  HR: 80 (12-16-21 @ 13:30)  BP: 135/75 (12-16-21 @ 13:30) (114/65 - 145/70)  RR: 19 (12-16-21 @ 13:30)  SpO2: 100% (12-16-21 @ 13:30)  Vent Settings:     In:   12-16-21 @ 07:01  -  12-16-21 @ 17:24  --------------------------------------------------------  IN: 0 mL    IV Fluids: lactated ringers. 1000 milliLiter(s) (100 mL/Hr) IV Continuous <Continuous>    Out:   12-16-21 @ 07:01  -  12-16-21 @ 17:24  --------------------------------------------------------  OUT: 800 mL    EBL: 50mL    Voided Urine:   12-16-21 @ 07:01  -  12-16-21 @ 17:24  --------------------------------------------------------  OUT: 800 mL    Rajput Catheter: yes      Physical Examination:  General Appearance: NAD, A&Ox3  HEENT: EOMI, sclera non-icteric.  Heart: RRR  Lungs: CTABL  Abdomen:  Soft, mildy tender, appropriate for post-op course, nondistended. No rigidity, guarding, or rebound tenderness.   MSK/Extremities: Warm & well-perfused. Peripheral pulses intact.  Skin: Warm, dry. No jaundice.   Incisions/Wounds: Dressings in place, clean, dry and intact, no signs of infection/active bleeding/drainage    Medications: [Standing]  acetaminophen     Tablet .. 650 milliGRAM(s) Oral every 6 hours  ceFAZolin   IVPB 2000 milliGRAM(s) IV Intermittent every 8 hours  enoxaparin Injectable 40 milliGRAM(s) SubCutaneous daily  HYDROmorphone  Injectable 1 milliGRAM(s) IV Push every 6 hours PRN  insulin lispro (ADMELOG) corrective regimen sliding scale   SubCutaneous three times a day before meals  lactated ringers. 1000 milliLiter(s) IV Continuous <Continuous>  pantoprazole    Tablet 40 milliGRAM(s) Oral before breakfast    Medications: [PRN]  acetaminophen     Tablet .. 650 milliGRAM(s) Oral every 6 hours  ceFAZolin   IVPB 2000 milliGRAM(s) IV Intermittent every 8 hours  enoxaparin Injectable 40 milliGRAM(s) SubCutaneous daily  HYDROmorphone  Injectable 1 milliGRAM(s) IV Push every 6 hours PRN  insulin lispro (ADMELOG) corrective regimen sliding scale   SubCutaneous three times a day before meals  lactated ringers. 1000 milliLiter(s) IV Continuous <Continuous>  pantoprazole    Tablet 40 milliGRAM(s) Oral before breakfast    DVT PROPHYLAXIS: enoxaparin Injectable 40 milliGRAM(s) SubCutaneous daily    GI PROPHYLAXIS: pantoprazole    Tablet 40 milliGRAM(s) Oral before breakfast    ANTICOAGULATION:   ANTIBIOTICS:  ceFAZolin   IVPB 2000 milliGRAM(s)    Labs:    Imaging:  No post-op imaging studies    Assessment:  61yF s/p Robot-assisted splenic flexure resection with B/L TAP blocks    Maciel  used: Kraig ID# [id number]    Plan:  - Clear liquid diet   - Monitor vitals  - Monitor post-op labs and replete as necessary  - Monitor for bowel function  - Pain control  - Encourage ambulation as tolerated  - Monitor urine output and trial of void once Rajput removed  - DVT and GI Prophylaxis  - Monitor wound and dressing for changes, redress as needed.  - Follow up PT consult       Date/Time: 12-16-21 @ 17:24
Patient was spoken to with Cantonese  Selvin ([id number]) and all questions were answered. Patient stated she walked with PT and ambulated a few laps along with stairs. Patient states no diarrhea, nausea, vomiting or any problems. She also had no pain on palpation at incision sites.

## 2021-12-17 NOTE — PROGRESS NOTE ADULT - ASSESSMENT
61 year old man with pmh of colon cancer and diabetes is hospital day 2 POD 1 s/p robotic assisted splenic flexure resection.    Plan    Tolerating CLD  -gas, -bm  not yet ambulating  can d/c macias today, f/u tov  change fluids to maintenance at 40 cc/hr  DVT PPX  GI PPX  PT    Spectra: [phone]

## 2021-12-17 NOTE — PHYSICAL THERAPY INITIAL EVALUATION ADULT - GENERAL OBSERVATIONS, REHAB EVAL
PT IE [id number]. Chart reviewed. Pt encountered semi-reclined in bed. + IV lock, + macias, + B sequentials. Cantoneese  [id number]. Pts daughter Shruthi also assisted with translation during IE

## 2021-12-17 NOTE — PROGRESS NOTE ADULT - SUBJECTIVE AND OBJECTIVE BOX
GENERAL SURGERY PROGRESS NOTE    Patient: WILFREDO SARKAR , 61y ([birth year])Female   MRN: [id number]  Location: [address]  Visit: 12-16-21 Inpatient  Date: 12-17-21 @ 02:04    Hospital Day #: 2  Post-Op Day #: 1    Procedure/Dx/Injuries: s/p robotic assisted splenic flexure resection    Events of past 24 hours: Patient not having any pain, has not yet ambulated, macias still in place, no gas or bowel movements, resting comfortably.    PAST MEDICAL & SURGICAL HISTORY:  Diabetes    Cancer, colon    S/P colonoscopy        Vitals:   T(F): 98.8 (12-17-21 @ 00:41), Max: 98.8 (12-17-21 @ 00:41)  HR: 78 (12-17-21 @ 00:41)  BP: 101/58 (12-17-21 @ 00:41)  RR: 18 (12-17-21 @ 00:41)  SpO2: 96% (12-17-21 @ 00:41)      Diet, Clear Liquid      Fluids: lactated ringers.: Solution, 1000 milliLiter(s) infuse at 100 mL/Hr  Provider's Contact #: [phone]      I & O's:        PHYSICAL EXAM:  General: NAD, AAOx3, calm and cooperative  HEENT: NCAT, MARK, EOMI, Trachea ML, Neck supple  Cardiac: RRR S1, S2, no Murmurs, rubs or gallops  Respiratory: CTAB, normal respiratory effort, breath sounds equal BL, no wheeze, rhonchi or crackles  Abdomen: Soft, non-distended, non-tender, incisions without erythema, edema, drainage    MEDICATIONS  (STANDING):  acetaminophen     Tablet .. 650 milliGRAM(s) Oral every 6 hours  ceFAZolin   IVPB 2000 milliGRAM(s) IV Intermittent every 8 hours  enoxaparin Injectable 40 milliGRAM(s) SubCutaneous daily  insulin lispro (ADMELOG) corrective regimen sliding scale   SubCutaneous three times a day before meals  lactated ringers. 1000 milliLiter(s) (100 mL/Hr) IV Continuous <Continuous>  pantoprazole    Tablet 40 milliGRAM(s) Oral before breakfast    MEDICATIONS  (PRN):  HYDROmorphone  Injectable 1 milliGRAM(s) IV Push every 6 hours PRN Severe Pain (7 - 10)  ketorolac   Injectable 30 milliGRAM(s) IV Push every 6 hours PRN Moderate Pain (4 - 6)      DVT PROPHYLAXIS: enoxaparin Injectable 40 milliGRAM(s) SubCutaneous daily    GI PROPHYLAXIS: pantoprazole    Tablet 40 milliGRAM(s) Oral before breakfast    ANTICOAGULATION:   ANTIBIOTICS:  ceFAZolin   IVPB 2000 milliGRAM(s)            LAB/STUDIES:  Labs:  CAPILLARY BLOOD GLUCOSE      POCT Blood Glucose.: 135 mg/dL (16 Dec 2021 21:10)  POCT Blood Glucose.: 171 mg/dL (16 Dec 2021 16:36)  POCT Blood Glucose.: 185 mg/dL (16 Dec 2021 16:02)  POCT Blood Glucose.: 143 mg/dL (16 Dec 2021 13:06)  POCT Blood Glucose.: 119 mg/dL (16 Dec 2021 07:25)                          8.5    8.03  )-----------( 597      ( 16 Dec 2021 20:00 )             28.2       Auto Neutrophil %: 72.3 % (12-16-21 @ 20:00)  Auto Immature Granulocyte %: 0.2 % (12-16-21 @ 20:00)    12-16    141  |  105  |  9<L>  ----------------------------<  132<H>  4.6   |  17  |  0.7      Calcium, Total Serum: 8.5 mg/dL (12-16-21 @ 20:00)      LFTs:         Coags:

## 2021-12-18 LAB
ANION GAP SERPL CALC-SCNC: 12 MMOL/L — SIGNIFICANT CHANGE UP (ref 7–14)
BASOPHILS # BLD AUTO: 0.04 K/UL — SIGNIFICANT CHANGE UP (ref 0–0.2)
BASOPHILS NFR BLD AUTO: 0.5 % — SIGNIFICANT CHANGE UP (ref 0–1)
BUN SERPL-MCNC: 10 MG/DL — SIGNIFICANT CHANGE UP (ref 10–20)
CALCIUM SERPL-MCNC: 8.9 MG/DL — SIGNIFICANT CHANGE UP (ref 8.5–10.1)
CHLORIDE SERPL-SCNC: 103 MMOL/L — SIGNIFICANT CHANGE UP (ref 98–110)
CO2 SERPL-SCNC: 23 MMOL/L — SIGNIFICANT CHANGE UP (ref 17–32)
CREAT SERPL-MCNC: 1.1 MG/DL — SIGNIFICANT CHANGE UP (ref 0.7–1.5)
EOSINOPHIL # BLD AUTO: 0.08 K/UL — SIGNIFICANT CHANGE UP (ref 0–0.7)
EOSINOPHIL NFR BLD AUTO: 1 % — SIGNIFICANT CHANGE UP (ref 0–8)
GLUCOSE BLDC GLUCOMTR-MCNC: 103 MG/DL — HIGH (ref 70–99)
GLUCOSE BLDC GLUCOMTR-MCNC: 262 MG/DL — HIGH (ref 70–99)
GLUCOSE BLDC GLUCOMTR-MCNC: 96 MG/DL — SIGNIFICANT CHANGE UP (ref 70–99)
GLUCOSE BLDC GLUCOMTR-MCNC: 99 MG/DL — SIGNIFICANT CHANGE UP (ref 70–99)
GLUCOSE SERPL-MCNC: 83 MG/DL — SIGNIFICANT CHANGE UP (ref 70–99)
HCT VFR BLD CALC: 31.6 % — LOW (ref 37–47)
HGB BLD-MCNC: 9.5 G/DL — LOW (ref 12–16)
IMM GRANULOCYTES NFR BLD AUTO: 0.2 % — SIGNIFICANT CHANGE UP (ref 0.1–0.3)
LYMPHOCYTES # BLD AUTO: 2.79 K/UL — SIGNIFICANT CHANGE UP (ref 1.2–3.4)
LYMPHOCYTES # BLD AUTO: 33.1 % — SIGNIFICANT CHANGE UP (ref 20.5–51.1)
MAGNESIUM SERPL-MCNC: 2.1 MG/DL — SIGNIFICANT CHANGE UP (ref 1.8–2.4)
MCHC RBC-ENTMCNC: 22.7 PG — LOW (ref 27–31)
MCHC RBC-ENTMCNC: 30.1 G/DL — LOW (ref 32–37)
MCV RBC AUTO: 75.6 FL — LOW (ref 81–99)
MONOCYTES # BLD AUTO: 0.62 K/UL — HIGH (ref 0.1–0.6)
MONOCYTES NFR BLD AUTO: 7.4 % — SIGNIFICANT CHANGE UP (ref 1.7–9.3)
NEUTROPHILS # BLD AUTO: 4.87 K/UL — SIGNIFICANT CHANGE UP (ref 1.4–6.5)
NEUTROPHILS NFR BLD AUTO: 57.8 % — SIGNIFICANT CHANGE UP (ref 42.2–75.2)
NRBC # BLD: 0 /100 WBCS — SIGNIFICANT CHANGE UP (ref 0–0)
PHOSPHATE SERPL-MCNC: 3.5 MG/DL — SIGNIFICANT CHANGE UP (ref 2.1–4.9)
PLATELET # BLD AUTO: 688 K/UL — HIGH (ref 130–400)
POTASSIUM SERPL-MCNC: 4.6 MMOL/L — SIGNIFICANT CHANGE UP (ref 3.5–5)
POTASSIUM SERPL-SCNC: 4.6 MMOL/L — SIGNIFICANT CHANGE UP (ref 3.5–5)
RBC # BLD: 4.18 M/UL — LOW (ref 4.2–5.4)
RBC # FLD: 20.4 % — HIGH (ref 11.5–14.5)
SODIUM SERPL-SCNC: 138 MMOL/L — SIGNIFICANT CHANGE UP (ref 135–146)
WBC # BLD: 8.42 K/UL — SIGNIFICANT CHANGE UP (ref 4.8–10.8)
WBC # FLD AUTO: 8.42 K/UL — SIGNIFICANT CHANGE UP (ref 4.8–10.8)

## 2021-12-18 PROCEDURE — 99232 SBSQ HOSP IP/OBS MODERATE 35: CPT

## 2021-12-18 RX ORDER — POTASSIUM CHLORIDE 20 MEQ
20 PACKET (EA) ORAL ONCE
Refills: 0 | Status: COMPLETED | OUTPATIENT
Start: 2021-12-18 | End: 2021-12-18

## 2021-12-18 RX ADMIN — Medication 650 MILLIGRAM(S): at 00:00

## 2021-12-18 RX ADMIN — Medication 650 MILLIGRAM(S): at 05:07

## 2021-12-18 RX ADMIN — Medication 6: at 17:27

## 2021-12-18 RX ADMIN — PANTOPRAZOLE SODIUM 40 MILLIGRAM(S): 20 TABLET, DELAYED RELEASE ORAL at 05:08

## 2021-12-18 RX ADMIN — Medication 650 MILLIGRAM(S): at 11:27

## 2021-12-18 RX ADMIN — Medication 650 MILLIGRAM(S): at 17:30

## 2021-12-18 RX ADMIN — Medication 650 MILLIGRAM(S): at 05:35

## 2021-12-18 RX ADMIN — SODIUM CHLORIDE 40 MILLILITER(S): 9 INJECTION, SOLUTION INTRAVENOUS at 08:13

## 2021-12-18 RX ADMIN — ENOXAPARIN SODIUM 40 MILLIGRAM(S): 100 INJECTION SUBCUTANEOUS at 11:27

## 2021-12-18 RX ADMIN — Medication 20 MILLIEQUIVALENT(S): at 05:08

## 2021-12-18 RX ADMIN — Medication 650 MILLIGRAM(S): at 17:28

## 2021-12-18 NOTE — PROGRESS NOTE ADULT - SUBJECTIVE AND OBJECTIVE BOX
GENERAL SURGERY PROGRESS NOTE    Patient: WILFREDO SARKAR , 61y ([birth year])Female   MRN: [id number]  Location: [address]  Visit: 12-16-21 Inpatient  Date: 12-18-21 @ 07:42      Procedure/Dx/Injuries: s/p robotic assisted splenic flexure    Events of past 24 hours: -bm,-gas, ambulating, no pain, +tov    PAST MEDICAL & SURGICAL HISTORY:  Diabetes    Cancer, colon    S/P colonoscopy        Vitals:   T(F): 96.7 (12-18-21 @ 05:00), Max: 99.6 (12-17-21 @ 17:22)  HR: 75 (12-18-21 @ 05:00)  BP: 122/58 (12-18-21 @ 05:00)  RR: 18 (12-18-21 @ 05:00)  SpO2: 98% (12-18-21 @ 05:00)          Fluids:     I & O's:    12-17-21 @ 07:01  -  12-18-21 @ 07:00  --------------------------------------------------------  IN:    Lactated Ringers: 780 mL  Total IN: 780 mL    OUT:    Indwelling Catheter - Urethral (mL): 800 mL    Post-Void Residual per Intermittent Catheterization (mL): 300 mL    Voided (mL): 1600 mL  Total OUT: 2700 mL    Total NET: -1920 mL          PHYSICAL EXAM:  General Appearance: AAOX3, NAD  Heart: RRR  Lungs: CTAX2  Abdomen:  post surgical wounds clean, no secretions, soft, non distended  MSK/Extremities:  mobile, no edema    MEDICATIONS  (STANDING):  acetaminophen     Tablet .. 650 milliGRAM(s) Oral every 6 hours  enoxaparin Injectable 40 milliGRAM(s) SubCutaneous daily  insulin lispro (ADMELOG) corrective regimen sliding scale   SubCutaneous three times a day before meals  lactated ringers. 1000 milliLiter(s) (40 mL/Hr) IV Continuous <Continuous>  pantoprazole    Tablet 40 milliGRAM(s) Oral before breakfast    MEDICATIONS  (PRN):  HYDROmorphone  Injectable 1 milliGRAM(s) IV Push every 6 hours PRN Severe Pain (7 - 10)  ketorolac   Injectable 30 milliGRAM(s) IV Push every 6 hours PRN Moderate Pain (4 - 6)      DVT PROPHYLAXIS: enoxaparin Injectable 40 milliGRAM(s) SubCutaneous daily    GI PROPHYLAXIS: pantoprazole    Tablet 40 milliGRAM(s) Oral before breakfast    ANTICOAGULATION:   ANTIBIOTICS:            LAB/STUDIES:  Labs:  CAPILLARY BLOOD GLUCOSE      POCT Blood Glucose.: 100 mg/dL (17 Dec 2021 21:16)  POCT Blood Glucose.: 129 mg/dL (17 Dec 2021 17:01)  POCT Blood Glucose.: 98 mg/dL (17 Dec 2021 11:35)                          8.0    6.99  )-----------( 571      ( 17 Dec 2021 21:40 )             26.6       Auto Neutrophil %: 51.7 % (12-17-21 @ 21:40)  Auto Immature Granulocyte %: 0.1 % (12-17-21 @ 21:40)    12-17    139  |  105  |  5<L>  ----------------------------<  91  3.8   |  21  |  0.6<L>      Calcium, Total Serum: 8.1 mg/dL (12-17-21 @ 21:40)      LFTs:         Coags:                        IMAGING:      ACCESS/ DEVICES:  [ ] Peripheral IV  [ ] Central Venous Line	[ ] R	[ ] L	[ ] IJ	[ ] Fem	[ ] SC	Placed:   [ ] Arterial Line		[ ] R	[ ] L	[ ] Fem	[ ] Rad	[ ] Ax	Placed:   [ ] PICC:					[ ] Mediport  [ ] Urinary Catheter,  Date Placed:   [ ] Chest tube: [ ] Right, [ ] Left  [ ] VEENA/Zachary Drains         GENERAL SURGERY PROGRESS NOTE    Patient: WILFREDO SARKAR , 61y ([birth year])Female   MRN: [id number]  Location: [address]  Visit: 12-16-21 Inpatient  Date: 12-18-21 @ 07:42      Procedure/Dx/Injuries: s/p robotic assisted splenic flexure    Events of past 24 hours: -bm,+gas, ambulating, no pain, +tov    PAST MEDICAL & SURGICAL HISTORY:  Diabetes    Cancer, colon    S/P colonoscopy        Vitals:   T(F): 96.7 (12-18-21 @ 05:00), Max: 99.6 (12-17-21 @ 17:22)  HR: 75 (12-18-21 @ 05:00)  BP: 122/58 (12-18-21 @ 05:00)  RR: 18 (12-18-21 @ 05:00)  SpO2: 98% (12-18-21 @ 05:00)          Fluids:     I & O's:    12-17-21 @ 07:01  -  12-18-21 @ 07:00  --------------------------------------------------------  IN:    Lactated Ringers: 780 mL  Total IN: 780 mL    OUT:    Indwelling Catheter - Urethral (mL): 800 mL    Post-Void Residual per Intermittent Catheterization (mL): 300 mL    Voided (mL): 1600 mL  Total OUT: 2700 mL    Total NET: -1920 mL          PHYSICAL EXAM:  General Appearance: AAOX3, NAD  Heart: RRR  Lungs: CTAX2  Abdomen:  post surgical wounds clean, no secretions, soft, non distended  MSK/Extremities:  mobile, no edema    MEDICATIONS  (STANDING):  acetaminophen     Tablet .. 650 milliGRAM(s) Oral every 6 hours  enoxaparin Injectable 40 milliGRAM(s) SubCutaneous daily  insulin lispro (ADMELOG) corrective regimen sliding scale   SubCutaneous three times a day before meals  lactated ringers. 1000 milliLiter(s) (40 mL/Hr) IV Continuous <Continuous>  pantoprazole    Tablet 40 milliGRAM(s) Oral before breakfast    MEDICATIONS  (PRN):  HYDROmorphone  Injectable 1 milliGRAM(s) IV Push every 6 hours PRN Severe Pain (7 - 10)  ketorolac   Injectable 30 milliGRAM(s) IV Push every 6 hours PRN Moderate Pain (4 - 6)      DVT PROPHYLAXIS: enoxaparin Injectable 40 milliGRAM(s) SubCutaneous daily    GI PROPHYLAXIS: pantoprazole    Tablet 40 milliGRAM(s) Oral before breakfast    ANTICOAGULATION:   ANTIBIOTICS:            LAB/STUDIES:  Labs:  CAPILLARY BLOOD GLUCOSE      POCT Blood Glucose.: 100 mg/dL (17 Dec 2021 21:16)  POCT Blood Glucose.: 129 mg/dL (17 Dec 2021 17:01)  POCT Blood Glucose.: 98 mg/dL (17 Dec 2021 11:35)                          8.0    6.99  )-----------( 571      ( 17 Dec 2021 21:40 )             26.6       Auto Neutrophil %: 51.7 % (12-17-21 @ 21:40)  Auto Immature Granulocyte %: 0.1 % (12-17-21 @ 21:40)    12-17    139  |  105  |  5<L>  ----------------------------<  91  3.8   |  21  |  0.6<L>      Calcium, Total Serum: 8.1 mg/dL (12-17-21 @ 21:40)      LFTs:         Coags:                        IMAGING:      ACCESS/ DEVICES:  [ ] Peripheral IV  [ ] Central Venous Line	[ ] R	[ ] L	[ ] IJ	[ ] Fem	[ ] SC	Placed:   [ ] Arterial Line		[ ] R	[ ] L	[ ] Fem	[ ] Rad	[ ] Ax	Placed:   [ ] PICC:					[ ] Mediport  [ ] Urinary Catheter,  Date Placed:   [ ] Chest tube: [ ] Right, [ ] Left  [ ] VEENA/Zachary Drains

## 2021-12-18 NOTE — PROGRESS NOTE ADULT - ASSESSMENT
61 year old man with pmh of colon cancer and diabetes is s/p robotic assisted splenic flexure resection. Evaluated at bedside AAOX3, NAD denies any pain, nausea or vomit.    Plan:    Tolerating CLD  -gas, -bm  not yet ambulating  ivfs at 40 cc/hr  GI/DVT PPX    Spectra: [phone]   61 year old female with pmh of colon cancer and diabetes is s/p robotic assisted splenic flexure resection. Evaluated at bedside AAOX3, NAD denies any pain, nausea or vomit.    Plan:    Tolerating CLD  +gas, -bm  not yet ambulating  ivfs at 40 cc/hr  GI/DVT PPX    Spectra: [phone]

## 2021-12-19 ENCOUNTER — TRANSCRIPTION ENCOUNTER (OUTPATIENT)
Age: 61
End: 2021-12-19

## 2021-12-19 VITALS
OXYGEN SATURATION: 98 % | SYSTOLIC BLOOD PRESSURE: 115 MMHG | HEART RATE: 90 BPM | TEMPERATURE: 97 F | DIASTOLIC BLOOD PRESSURE: 56 MMHG | RESPIRATION RATE: 19 BRPM

## 2021-12-19 LAB
GLUCOSE BLDC GLUCOMTR-MCNC: 110 MG/DL — HIGH (ref 70–99)
GLUCOSE BLDC GLUCOMTR-MCNC: 133 MG/DL — HIGH (ref 70–99)

## 2021-12-19 PROCEDURE — 99232 SBSQ HOSP IP/OBS MODERATE 35: CPT

## 2021-12-19 RX ORDER — OXYCODONE HYDROCHLORIDE 5 MG/1
1 TABLET ORAL
Qty: 12 | Refills: 0
Start: 2021-12-19

## 2021-12-19 RX ORDER — ENOXAPARIN SODIUM 100 MG/ML
40 INJECTION SUBCUTANEOUS
Qty: 28 | Refills: 0
Start: 2021-12-19 | End: 2022-01-15

## 2021-12-19 RX ORDER — ACETAMINOPHEN 500 MG
2 TABLET ORAL
Qty: 0 | Refills: 0 | DISCHARGE
Start: 2021-12-19

## 2021-12-19 RX ADMIN — Medication 650 MILLIGRAM(S): at 06:25

## 2021-12-19 RX ADMIN — Medication 650 MILLIGRAM(S): at 00:02

## 2021-12-19 RX ADMIN — Medication 650 MILLIGRAM(S): at 13:00

## 2021-12-19 RX ADMIN — ENOXAPARIN SODIUM 40 MILLIGRAM(S): 100 INJECTION SUBCUTANEOUS at 13:00

## 2021-12-19 RX ADMIN — PANTOPRAZOLE SODIUM 40 MILLIGRAM(S): 20 TABLET, DELAYED RELEASE ORAL at 06:24

## 2021-12-19 RX ADMIN — Medication 650 MILLIGRAM(S): at 13:03

## 2021-12-19 NOTE — DISCHARGE NOTE NURSING/CASE MANAGEMENT/SOCIAL WORK - NSDCPEFALRISK_GEN_ALL_CORE
For information on Fall & Injury Prevention, visit: [email or web address] OR  [email or web address] OR  https://www.cdc.gov/steadi/patient.html

## 2021-12-19 NOTE — DISCHARGE NOTE PROVIDER - CARE PROVIDER_API CALL
Marcus Becerra)  ColonRectal Surgery; Surgery  [address] [facility], [address]rd [place] [address]  Phone: [phone]  Fax: [phone]  Established Patient  Follow Up Time:

## 2021-12-19 NOTE — DISCHARGE NOTE PROVIDER - HOSPITAL COURSE
Pt is a 62 yo F recently diagnosed with splenic flexure colon cancer. She was admitted 12/16 for elective robot assisted splenic flexure resection. She underwent the procedure without complication. Postoperatively she was continued on enhanced recovery protocol and her diet was progressively advanced which she tolerated. She was evaluated by PT and deemed appropriate for d/c home with RW and home PT. On the day of discharge the patient with afebrile, pain controlled, tolerating a diet, voiding, having bowel function, and ambulating. Stable for discharge home with follow up.

## 2021-12-19 NOTE — DISCHARGE NOTE PROVIDER - NSDCMRMEDTOKEN_GEN_ALL_CORE_FT
acetaminophen 325 mg oral tablet: 2 tab(s) orally every 6 hours as needed for mild pain  Lovenox 40 mg/0.4 mL injectable solution: 40 milligram(s) subcutaneously once a day   metFORMIN 500 mg oral tablet: 1 tab(s) orally 2 times a day  oxyCODONE 5 mg oral capsule: 1 cap(s) orally every 4 hours, As Needed for severe postoperative pain MDD:6 capsules    acetaminophen 325 mg oral tablet: 2 tab(s) orally every 6 hours as needed for mild pain  Lovenox 40 mg/0.4 mL injectable solution: 40 milligram(s) subcutaneously once a day   metFORMIN 500 mg oral tablet: 1 tab(s) orally 2 times a day  Oxaydo 5 mg oral tablet: 1 tab(s) orally every 4 hours, As Needed for severe postoperative pain MDD:6 capsules

## 2021-12-19 NOTE — PROGRESS NOTE ADULT - ASSESSMENT
ASSESSMENT:  61 year old female with pmh of colon cancer and diabetes is s/p robotic assisted splenic flexure resection. Evaluated at bedside AAOX3, NAD denies any pain, nausea or vomit. Patient is tolerating advancement of diet with gas and bowel movements.       PLAN:  - Encourage ambulation   - Advance diet as tolerated   - Monitor vitals  - Dispo planning   - DVT/ GI ppx       BLUE TEAM SPECTRA: [id number] ASSESSMENT:  61 year old female with pmh of colon cancer and diabetes is s/p robotic assisted splenic flexure resection. Evaluated at bedside AAOX3, NAD denies any pain, nausea or vomit. Patient is tolerating advancement of diet with gas and bowel movements.     PLAN:  -pain control prn  -IS, OOB ad catia  -continue low fiber diet  -SCDs and Lovenox for DVT prophylaxis  -continue home meds  -d/c home today with RW and PT. Continue Lovenox for DVT prophylaxis upon d/c    BLUE TEAM SPECTRA: [id number] ASSESSMENT:  61 year old female with pmh of colon cancer and diabetes now POD 3 from robotic assisted splenic flexure resection. Doing well.  PLAN:  -pain control prn  -IS, OOB ad catia  -continue low fiber diet  -SCDs and Lovenox for DVT prophylaxis  -continue home meds  -d/c home today with RW and PT. Continue Lovenox for DVT prophylaxis upon d/c    BLUE TEAM SPECTRA: [id number]

## 2021-12-19 NOTE — DISCHARGE NOTE NURSING/CASE MANAGEMENT/SOCIAL WORK - PATIENT PORTAL LINK FT
You can access the FollowMyHealth Patient Portal offered by [facility] by registering at the following website: [email or web address]. By joining [employer]’s FollowMyHealth portal, you will also be able to view your health information using other applications (apps) compatible with our system.

## 2021-12-19 NOTE — DISCHARGE NOTE PROVIDER - NSDCCPTREATMENT_GEN_ALL_CORE_FT
PRINCIPAL PROCEDURE  Procedure: Colectomy, left, partial, robot-assisted, laparoscopic  Findings and Treatment: Meds: You may continue all prior home meds. Take tylenol around the clock for 3 days (no more than 4 grams in 24 hours). Take the prescribed Oxycodone as needed only for severe pain.    Diet: continue low fiber diet. Drink plenty of fluid to avoid constipation.  Wound care: Your incisions are closed with stitches under the skin (which will dissolve over time) and surgical glue on top. The glue will come off on its own in a few week's time. You may shower allowing the warm water and soap to run over your dressings. No scrubbing. Pat dry. Leave open to air. No baths or swimming until cleared by surgeon.  Activity: No strenuous activity or lifting more than 10 pounds for 2 weeks (until follow up with surgeon at which point he will instruct you further).  Follow up: Please call for follow up in 1 week with Dr. Becerra  Call or return to the ER for fevers, chills, worsening pain, chest pain, or shortness of breath, inability to urinate, inability to pass gas for more than 1 day or have a bowel movement for more than 2 days.

## 2021-12-19 NOTE — PROGRESS NOTE ADULT - SUBJECTIVE AND OBJECTIVE BOX
GENERAL SURGERY PROGRESS NOTE    Patient: WILFREDO SARKAR , 61y ([birth year])Female   MRN: [id number]  Location: [address]  Visit: 12-16-21 Inpatient  Date: 12-19-21 @ 01:03    Hospital Day #: 4  Post-Op Day #: 3     Procedure/Dx/Injuries: s/p robotic assisted splenic flexure resection     Events of past 24 hours: Patient had diet advanced to low fiber diet and is tolerating well with no N/V. Patient has gas and bowel movements and has no complaints. Was sleeping peacefully when seen.     PAST MEDICAL & SURGICAL HISTORY:  Diabetes    Cancer, colon    S/P colonoscopy        Vitals:   T(F): 97.1 (12-18-21 @ 21:20), Max: 99.1 (12-18-21 @ 13:00)  HR: 86 (12-18-21 @ 21:20)  BP: 111/61 (12-18-21 @ 21:20)  RR: 18 (12-18-21 @ 21:20)  SpO2: 98% (12-18-21 @ 21:20)      Diet, Low Fiber:   Consistent Carbohydrate No Snacks      Fluids:     I & O's:    12-17-21 @ 07:01  -  12-18-21 @ 07:00  --------------------------------------------------------  IN:    Lactated Ringers: 780 mL  Total IN: 780 mL    OUT:    Indwelling Catheter - Urethral (mL): 800 mL    Post-Void Residual per Intermittent Catheterization (mL): 300 mL    Voided (mL): 1600 mL  Total OUT: 2700 mL    Total NET: -1920 mL      PHYSICAL EXAM:  General: NAD, AAOx3, calm and cooperative  HEENT: NCAT, MARK, EOMI, Trachea ML, Neck supple  Cardiac: RRR S1, S2, no Murmurs, rubs or gallops  Respiratory: CTAB, normal respiratory effort, breath sounds equal BL, no wheeze, rhonchi or crackles  Abdomen: Soft, non-distended, non-tender, no rebound, no guarding. +BS.  Musculoskeletal: Strength 5/5 BL UE/LE, ROM intact, compartments soft  Skin: Warm/dry, normal color, no jaundice  Incision/wound: healing well, dressings in place, clean, dry and intact    MEDICATIONS  (STANDING):  acetaminophen     Tablet .. 650 milliGRAM(s) Oral every 6 hours  enoxaparin Injectable 40 milliGRAM(s) SubCutaneous daily  insulin lispro (ADMELOG) corrective regimen sliding scale   SubCutaneous three times a day before meals  pantoprazole    Tablet 40 milliGRAM(s) Oral before breakfast    MEDICATIONS  (PRN):  HYDROmorphone  Injectable 1 milliGRAM(s) IV Push every 6 hours PRN Severe Pain (7 - 10)  ketorolac   Injectable 30 milliGRAM(s) IV Push every 6 hours PRN Moderate Pain (4 - 6)      DVT PROPHYLAXIS: enoxaparin Injectable 40 milliGRAM(s) SubCutaneous daily    GI PROPHYLAXIS: pantoprazole    Tablet 40 milliGRAM(s) Oral before breakfast    ANTICOAGULATION:   ANTIBIOTICS:            LAB/STUDIES:  Labs:  CAPILLARY BLOOD GLUCOSE      POCT Blood Glucose.: 96 mg/dL (18 Dec 2021 21:03)  POCT Blood Glucose.: 262 mg/dL (18 Dec 2021 16:58)  POCT Blood Glucose.: 103 mg/dL (18 Dec 2021 11:19)  POCT Blood Glucose.: 99 mg/dL (18 Dec 2021 08:20)                          9.5    8.42  )-----------( 688      ( 18 Dec 2021 20:00 )             31.6       Auto Neutrophil %: 57.8 % (12-18-21 @ 20:00)  Auto Immature Granulocyte %: 0.2 % (12-18-21 @ 20:00)    12-18    138  |  103  |  10  ----------------------------<  83  4.6   |  23  |  1.1      Calcium, Total Serum: 8.9 mg/dL (12-18-21 @ 20:00)      LFTs:         Coags:               GENERAL SURGERY PROGRESS NOTE    Patient: WILFREDO SARKAR , 61y ([birth year])Female   MRN: [id number]  Location: [address]  Visit: 12-16-21 Inpatient  Date: 12-19-21 @ 01:03    Hospital Day #: 4  Post-Op Day #: 3     Procedure/Dx/Injuries: s/p robotic assisted splenic flexure resection     Events of past 24 hours:  Pt interview conducted with the help of DearJanee  #[id number]    Patient had diet advanced to low fiber diet and is tolerating well with no N/V. Patient has gas and bowel movements and has no complaints.     PAST MEDICAL & SURGICAL HISTORY:  Diabetes    Cancer, colon    S/P colonoscopy    Vitals:   T(F): 97.1 (12-18-21 @ 21:20), Max: 99.1 (12-18-21 @ 13:00)  HR: 86 (12-18-21 @ 21:20)  BP: 111/61 (12-18-21 @ 21:20)  RR: 18 (12-18-21 @ 21:20)  SpO2: 98% (12-18-21 @ 21:20)    Diet, Low Fiber:   Consistent Carbohydrate No Snacks    Fluids:     I & O's:    12-17-21 @ 07:01  -  12-18-21 @ 07:00  --------------------------------------------------------  IN:    Lactated Ringers: 780 mL  Total IN: 780 mL    OUT:    Indwelling Catheter - Urethral (mL): 800 mL    Post-Void Residual per Intermittent Catheterization (mL): 300 mL    Voided (mL): 1600 mL  Total OUT: 2700 mL    Total NET: -1920 mL    PHYSICAL EXAM:  General: NAD, AAOx3, calm and cooperative  HEENT: NCAT, MARK, EOMI, Trachea ML, Neck supple  Cardiac: RRR S1, S2, no Murmurs, rubs or gallops  Respiratory: CTAB, normal respiratory effort, breath sounds equal BL, no wheeze, rhonchi or crackles  Abdomen: Soft, non-distended, non-tender, no rebound, no guarding. +BS.  Musculoskeletal: Strength 5/5 BL UE/LE, ROM intact, compartments soft  Skin: Warm/dry, normal color, no jaundice  Incision/wound: healing well, dressings in place, clean, dry and intact    MEDICATIONS  (STANDING):  acetaminophen     Tablet .. 650 milliGRAM(s) Oral every 6 hours  enoxaparin Injectable 40 milliGRAM(s) SubCutaneous daily  insulin lispro (ADMELOG) corrective regimen sliding scale   SubCutaneous three times a day before meals  pantoprazole    Tablet 40 milliGRAM(s) Oral before breakfast    MEDICATIONS  (PRN):  HYDROmorphone  Injectable 1 milliGRAM(s) IV Push every 6 hours PRN Severe Pain (7 - 10)  ketorolac   Injectable 30 milliGRAM(s) IV Push every 6 hours PRN Moderate Pain (4 - 6)    DVT PROPHYLAXIS: enoxaparin Injectable 40 milliGRAM(s) SubCutaneous daily    GI PROPHYLAXIS: pantoprazole    Tablet 40 milliGRAM(s) Oral before breakfast    ANTICOAGULATION:   ANTIBIOTICS:      LAB/STUDIES:  Labs:  CAPILLARY BLOOD GLUCOSE    POCT Blood Glucose.: 96 mg/dL (18 Dec 2021 21:03)  POCT Blood Glucose.: 262 mg/dL (18 Dec 2021 16:58)  POCT Blood Glucose.: 103 mg/dL (18 Dec 2021 11:19)  POCT Blood Glucose.: 99 mg/dL (18 Dec 2021 08:20)                       9.5    8.42  )-----------( 688      ( 18 Dec 2021 20:00 )             31.6       Auto Neutrophil %: 57.8 % (12-18-21 @ 20:00)  Auto Immature Granulocyte %: 0.2 % (12-18-21 @ 20:00)    12-18    138  |  103  |  10  ----------------------------<  83  4.6   |  23  |  1.1    Calcium, Total Serum: 8.9 mg/dL (12-18-21 @ 20:00)

## 2021-12-19 NOTE — PROGRESS NOTE ADULT - ATTENDING COMMENTS
61F POD1 s/p robotic assisted splenic flexure resection       Patient seen and examined.  No acute events over night.  Patient tolerating CLD, denies nausea, vomiting flatus or BM    Abdomen - soft, non distended, appropriately tender.  Incisions with dermabond in place.  No erythema or induration    Vitals and labs reviewed    Plan:  - CLD  - mIVF 40mL/hr  - 24 hours antibiotics  - DC macias  - TOV  - PT consult  - tylenol, toradol, Dilaudid for pain  - f/u bowel function
Pt. seen and examined this am with surgical PA.  Ms. Rios was lying comfortably in bed.  She reports passing a small amount of flatus this am.  No bowel movement.  No pain. Tolerating po liquids.  Voiding.     AVSS  A&OX3, NAD  Abd soft, mildly distended, nontender, incisions C/D/I  Ext warm    Labs reviewed    62 y/o female s/p robotic assisted splenic flexure resection for carcinoma, POD#2, recovering well  Plan:  Advance to low fiber diet, encourage pt to eat slowly and report any feelings of nausea  OOB and ambulate, PT consulted  DVT prophylaxis
Pt. seen and examined this am with surgical team.  Agree with above.  Pt recovering well. Tolerating low fiber diet.  Ambulating without difficulty.  Pain controlled.  (+) bowel function.    AVSS  Abd soft, NT, ND, incisions C/D/I    Plan discussed with Dr. Mariam Dawson for d/c home today  Will f/u with Dr. Becerra as outpatient.

## 2021-12-23 LAB — SURGICAL PATHOLOGY STUDY: SIGNIFICANT CHANGE UP

## 2022-01-02 DIAGNOSIS — C18.5 MALIGNANT NEOPLASM OF SPLENIC FLEXURE: ICD-10-CM

## 2022-01-02 DIAGNOSIS — Z79.84 LONG TERM (CURRENT) USE OF ORAL HYPOGLYCEMIC DRUGS: ICD-10-CM

## 2022-01-02 DIAGNOSIS — E11.9 TYPE 2 DIABETES MELLITUS WITHOUT COMPLICATIONS: ICD-10-CM

## 2022-01-02 DIAGNOSIS — K76.89 OTHER SPECIFIED DISEASES OF LIVER: ICD-10-CM

## 2022-01-05 ENCOUNTER — APPOINTMENT (OUTPATIENT)
Dept: SURGERY | Facility: CLINIC | Age: 62
End: 2022-01-05
Payer: MEDICAID

## 2022-01-05 VITALS
HEIGHT: 61 IN | HEART RATE: 89 BPM | BODY MASS INDEX: 21.34 KG/M2 | OXYGEN SATURATION: 98 % | TEMPERATURE: 97.1 F | SYSTOLIC BLOOD PRESSURE: 118 MMHG | DIASTOLIC BLOOD PRESSURE: 78 MMHG | WEIGHT: 113 LBS

## 2022-01-05 PROCEDURE — 99024 POSTOP FOLLOW-UP VISIT: CPT

## 2022-01-05 NOTE — CONSULT LETTER
[Dear  ___] : Dear  [unfilled], [Referral Letter:] : I am referring [unfilled] to you for further evaluation.  My most recent evaluation follows. [Please see my note below.] : Please see my note below. [Referral Closing:] : Thank you very much for seeing this patient.  If you have any questions, please do not hesitate to contact me. [FreeTextEntry3] : Sincerely,\par \par Marcus Becerra MD, Colon and Rectal Surgery\par \par [employer] [facility]\par [address] [place] [address]\par [facility], 3rd Floor\par [place], [place] [address]\par Tel [phone] ext [phone]\par Fax [phone]\par  [DrMatilda  ___] : Dr. RIVERA

## 2022-01-05 NOTE — HISTORY OF PRESENT ILLNESS
[FreeTextEntry1] : Patient is a 61F with DM who presents for post op appointment s/p robotic splenic flexure resection on 12/16/2021 for adenocarcinoma.  Pathology showed T3N1b moderately differentiated adenocarcinoma. Of note, patient had an obstructing lesion that required stent placement.  She progressed well post op.  Today she is tolerating a diet and moving her bowel without issue. Patient denies fevers, chills, nausea, vomiting, abdominal pain, constipation, diarrhea, blood in his stool or unexpected weight loss.  Patient denies a family history of colon cancer rectal cancer or inflammatory bowel disease.  Patient's last colonoscopy was 10/12/21 with Dr. Hart that showed an endoscopically obstructing proximal transverse colon mass. \par

## 2022-01-05 NOTE — PHYSICAL EXAM
[Abdomen Masses] : No abdominal masses [Abdomen Tenderness] : ~T No ~M abdominal tenderness [No HSM] : no hepatosplenomegaly [Respiratory Effort] : normal respiratory effort [Normal Rate and Rhythm] : normal rate and rhythm [de-identified] : well healing port sites and Pfannenstiel incisions.  No erythema induration or purulence [de-identified] : awake, alert and in no acute distress

## 2022-01-05 NOTE — ASSESSMENT
[FreeTextEntry1] : 61F s/p robotic splenic flexure resection for Stage III adenocarcinoma\par \par Patient is healing well from surgery.  She can return to a regular diet and physical activity.  We will refer the patient to Dr. Fajardo for chemotherapy.  We will see her back in 3 months.

## 2022-01-18 ENCOUNTER — APPOINTMENT (OUTPATIENT)
Dept: HEMATOLOGY ONCOLOGY | Facility: CLINIC | Age: 62
End: 2022-01-18
Payer: MEDICAID

## 2022-01-18 ENCOUNTER — OUTPATIENT (OUTPATIENT)
Dept: OUTPATIENT SERVICES | Facility: HOSPITAL | Age: 62
LOS: 1 days | Discharge: HOME | End: 2022-01-18

## 2022-01-18 DIAGNOSIS — Z98.890 OTHER SPECIFIED POSTPROCEDURAL STATES: Chronic | ICD-10-CM

## 2022-01-18 PROCEDURE — 99204 OFFICE O/P NEW MOD 45 MIN: CPT | Mod: 95

## 2022-01-18 PROCEDURE — 99205 OFFICE O/P NEW HI 60 MIN: CPT | Mod: 95

## 2022-01-18 RX ORDER — POLYETHYLENE GLYCOL 3350 17 G/17G
17 POWDER, FOR SOLUTION ORAL
Qty: 1 | Refills: 0 | Status: DISCONTINUED | COMMUNITY
Start: 2021-12-08 | End: 2022-01-18

## 2022-01-18 RX ORDER — VITAMIN B COMPLEX
TABLET ORAL DAILY
Refills: 0 | Status: ACTIVE | COMMUNITY
Start: 2022-01-18

## 2022-01-18 RX ORDER — FOLIC ACID 1 MG/1
1 TABLET ORAL DAILY
Refills: 0 | Status: ACTIVE | COMMUNITY
Start: 2022-01-18

## 2022-01-18 RX ORDER — METRONIDAZOLE 500 MG/1
500 TABLET ORAL
Qty: 6 | Refills: 0 | Status: DISCONTINUED | COMMUNITY
Start: 2021-12-08 | End: 2022-01-18

## 2022-01-18 RX ORDER — METFORMIN ER 500 MG 500 MG/1
500 TABLET ORAL TWICE DAILY
Refills: 0 | Status: ACTIVE | COMMUNITY
Start: 2022-01-18

## 2022-01-18 RX ORDER — NEOMYCIN SULFATE 500 MG/1
500 TABLET ORAL
Qty: 6 | Refills: 0 | Status: DISCONTINUED | COMMUNITY
Start: 2021-12-08 | End: 2022-01-18

## 2022-01-21 ENCOUNTER — APPOINTMENT (OUTPATIENT)
Dept: HEMATOLOGY ONCOLOGY | Facility: CLINIC | Age: 62
End: 2022-01-21

## 2022-01-21 ENCOUNTER — LABORATORY RESULT (OUTPATIENT)
Age: 62
End: 2022-01-21

## 2022-01-21 DIAGNOSIS — Z00.00 ENCOUNTER FOR GENERAL ADULT MEDICAL EXAMINATION W/OUT ABNORMAL FINDINGS: ICD-10-CM

## 2022-01-21 LAB
ALBUMIN SERPL ELPH-MCNC: 4.7 G/DL
ALP BLD-CCNC: 81 U/L
ALT SERPL-CCNC: 18 U/L
ANION GAP SERPL CALC-SCNC: 17 MMOL/L
AST SERPL-CCNC: 16 U/L
BILIRUB DIRECT SERPL-MCNC: <0.2 MG/DL
BILIRUB INDIRECT SERPL-MCNC: >0.2 MG/DL
BILIRUB SERPL-MCNC: 0.4 MG/DL
BUN SERPL-MCNC: 18 MG/DL
CALCIUM SERPL-MCNC: 9.7 MG/DL
CHLORIDE SERPL-SCNC: 105 MMOL/L
CO2 SERPL-SCNC: 18 MMOL/L
CREAT SERPL-MCNC: 0.6 MG/DL
GLUCOSE SERPL-MCNC: 90 MG/DL
HCT VFR BLD CALC: 31.3 %
HGB BLD-MCNC: 9.5 G/DL
IRON SATN MFR SERPL: 8 %
IRON SERPL-MCNC: 33 UG/DL
MCHC RBC-ENTMCNC: 22.2 PG
MCHC RBC-ENTMCNC: 30.4 G/DL
MCV RBC AUTO: 73.1 FL
PLATELET # BLD AUTO: 459 K/UL
PMV BLD: 10 FL
POTASSIUM SERPL-SCNC: 5.1 MMOL/L
PROT SERPL-MCNC: 7.2 G/DL
RBC # BLD: 4.28 M/UL
RBC # FLD: 21.6 %
SODIUM SERPL-SCNC: 140 MMOL/L
TIBC SERPL-MCNC: 429 UG/DL
UIBC SERPL-MCNC: 396 UG/DL
WBC # FLD AUTO: 7.33 K/UL

## 2022-01-25 ENCOUNTER — APPOINTMENT (OUTPATIENT)
Dept: INFUSION THERAPY | Facility: CLINIC | Age: 62
End: 2022-01-25

## 2022-01-25 ENCOUNTER — LABORATORY RESULT (OUTPATIENT)
Age: 62
End: 2022-01-25

## 2022-01-25 VITALS — WEIGHT: 116 LBS | HEIGHT: 61 IN | BODY MASS INDEX: 21.9 KG/M2

## 2022-01-25 RX ORDER — FAMOTIDINE 10 MG/ML
20 INJECTION INTRAVENOUS ONCE
Refills: 0 | Status: COMPLETED | OUTPATIENT
Start: 2022-01-25 | End: 2022-01-25

## 2022-01-25 RX ORDER — OXALIPLATIN 5 MG/ML
190 INJECTION, SOLUTION INTRAVENOUS ONCE
Refills: 0 | Status: COMPLETED | OUTPATIENT
Start: 2022-01-25 | End: 2022-01-25

## 2022-01-25 RX ORDER — IRON SUCROSE 20 MG/ML
200 INJECTION, SOLUTION INTRAVENOUS ONCE
Refills: 0 | Status: COMPLETED | OUTPATIENT
Start: 2022-01-25 | End: 2022-01-25

## 2022-01-25 RX ORDER — DIPHENHYDRAMINE HCL 50 MG
50 CAPSULE ORAL ONCE
Refills: 0 | Status: COMPLETED | OUTPATIENT
Start: 2022-01-25 | End: 2022-01-25

## 2022-01-25 RX ORDER — DEXAMETHASONE 0.5 MG/5ML
10 ELIXIR ORAL ONCE
Refills: 0 | Status: COMPLETED | OUTPATIENT
Start: 2022-01-25 | End: 2022-01-25

## 2022-01-25 RX ADMIN — Medication 118 MILLIGRAM(S): at 15:40

## 2022-01-25 RX ADMIN — FAMOTIDINE 104 MILLIGRAM(S): 10 INJECTION INTRAVENOUS at 16:00

## 2022-01-25 RX ADMIN — Medication 50 MILLIGRAM(S): at 16:35

## 2022-01-25 RX ADMIN — Medication 102 MILLIGRAM(S): at 16:20

## 2022-01-25 RX ADMIN — IRON SUCROSE 220 MILLIGRAM(S): 20 INJECTION, SOLUTION INTRAVENOUS at 14:51

## 2022-01-25 RX ADMIN — OXALIPLATIN 144 MILLIGRAM(S): 5 INJECTION, SOLUTION INTRAVENOUS at 15:37

## 2022-01-25 RX ADMIN — FAMOTIDINE 20 MILLIGRAM(S): 10 INJECTION INTRAVENOUS at 16:20

## 2022-01-25 RX ADMIN — IRON SUCROSE 200 MILLIGRAM(S): 20 INJECTION, SOLUTION INTRAVENOUS at 15:25

## 2022-01-25 RX ADMIN — Medication 10 MILLIGRAM(S): at 16:00

## 2022-01-26 LAB
CEA SERPL-MCNC: 3.1 NG/ML
FERRITIN SERPL-MCNC: 9 NG/ML
HCT VFR BLD CALC: 30.6 %
HGB BLD-MCNC: 9.4 G/DL
MCHC RBC-ENTMCNC: 22.2 PG
MCHC RBC-ENTMCNC: 30.7 G/DL
MCV RBC AUTO: 72.3 FL
PLATELET # BLD AUTO: 292 K/UL
PMV BLD: 11 FL
RBC # BLD: 4.23 M/UL
RBC # FLD: 21.8 %
WBC # FLD AUTO: 7.46 K/UL

## 2022-02-02 ENCOUNTER — APPOINTMENT (OUTPATIENT)
Dept: INFUSION THERAPY | Facility: CLINIC | Age: 62
End: 2022-02-02

## 2022-02-02 RX ORDER — IRON SUCROSE 20 MG/ML
200 INJECTION, SOLUTION INTRAVENOUS ONCE
Refills: 0 | Status: COMPLETED | OUTPATIENT
Start: 2022-02-02 | End: 2022-02-02

## 2022-02-02 RX ADMIN — IRON SUCROSE 220 MILLIGRAM(S): 20 INJECTION, SOLUTION INTRAVENOUS at 14:50

## 2022-02-08 ENCOUNTER — APPOINTMENT (OUTPATIENT)
Dept: INFUSION THERAPY | Facility: CLINIC | Age: 62
End: 2022-02-08

## 2022-02-08 RX ORDER — IRON SUCROSE 20 MG/ML
200 INJECTION, SOLUTION INTRAVENOUS ONCE
Refills: 0 | Status: COMPLETED | OUTPATIENT
Start: 2022-02-08 | End: 2022-02-08

## 2022-02-08 RX ADMIN — IRON SUCROSE 220 MILLIGRAM(S): 20 INJECTION, SOLUTION INTRAVENOUS at 14:56

## 2022-02-15 ENCOUNTER — APPOINTMENT (OUTPATIENT)
Dept: HEMATOLOGY ONCOLOGY | Facility: CLINIC | Age: 62
End: 2022-02-15
Payer: MEDICAID

## 2022-02-15 ENCOUNTER — LABORATORY RESULT (OUTPATIENT)
Age: 62
End: 2022-02-15

## 2022-02-15 ENCOUNTER — APPOINTMENT (OUTPATIENT)
Dept: INFUSION THERAPY | Facility: CLINIC | Age: 62
End: 2022-02-15
Payer: MEDICAID

## 2022-02-15 VITALS
TEMPERATURE: 98 F | DIASTOLIC BLOOD PRESSURE: 63 MMHG | HEIGHT: 61 IN | HEART RATE: 101 BPM | SYSTOLIC BLOOD PRESSURE: 133 MMHG | WEIGHT: 116 LBS | BODY MASS INDEX: 21.9 KG/M2

## 2022-02-15 LAB
ALBUMIN SERPL ELPH-MCNC: 4.1 G/DL
ALP BLD-CCNC: 90 U/L
ALT SERPL-CCNC: 18 U/L
ANION GAP SERPL CALC-SCNC: 16 MMOL/L
AST SERPL-CCNC: 37 U/L
BILIRUB DIRECT SERPL-MCNC: <0.2 MG/DL
BILIRUB INDIRECT SERPL-MCNC: >0.2 MG/DL
BILIRUB SERPL-MCNC: 0.4 MG/DL
BUN SERPL-MCNC: 15 MG/DL
CALCIUM SERPL-MCNC: 9.2 MG/DL
CHLORIDE SERPL-SCNC: 109 MMOL/L
CO2 SERPL-SCNC: 18 MMOL/L
CREAT SERPL-MCNC: 0.8 MG/DL
GLUCOSE SERPL-MCNC: 165 MG/DL
HCT VFR BLD CALC: 35 %
HGB BLD-MCNC: 10.9 G/DL
MAGNESIUM SERPL-MCNC: 2.1 MG/DL
MCHC RBC-ENTMCNC: 24.6 PG
MCHC RBC-ENTMCNC: 31.1 G/DL
MCV RBC AUTO: 79 FL
PLATELET # BLD AUTO: 232 K/UL
PMV BLD: NORMAL
POTASSIUM SERPL-SCNC: 4.8 MMOL/L
PROT SERPL-MCNC: 6.8 G/DL
RBC # BLD: 4.43 M/UL
RBC # FLD: 30.6 %
SODIUM SERPL-SCNC: 143 MMOL/L
WBC # FLD AUTO: 6.91 K/UL

## 2022-02-15 PROCEDURE — 99215 OFFICE O/P EST HI 40 MIN: CPT

## 2022-02-15 RX ORDER — DIPHENHYDRAMINE HCL 50 MG
50 CAPSULE ORAL ONCE
Refills: 0 | Status: COMPLETED | OUTPATIENT
Start: 2022-02-15 | End: 2022-02-15

## 2022-02-15 RX ORDER — FAMOTIDINE 10 MG/ML
20 INJECTION INTRAVENOUS ONCE
Refills: 0 | Status: COMPLETED | OUTPATIENT
Start: 2022-02-15 | End: 2022-02-15

## 2022-02-15 RX ORDER — OXALIPLATIN 5 MG/ML
190 INJECTION, SOLUTION INTRAVENOUS ONCE
Refills: 0 | Status: COMPLETED | OUTPATIENT
Start: 2022-02-15 | End: 2022-02-15

## 2022-02-15 RX ORDER — DEXAMETHASONE 0.5 MG/5ML
10 ELIXIR ORAL ONCE
Refills: 0 | Status: COMPLETED | OUTPATIENT
Start: 2022-02-15 | End: 2022-02-15

## 2022-02-15 RX ORDER — IRON SUCROSE 20 MG/ML
200 INJECTION, SOLUTION INTRAVENOUS ONCE
Refills: 0 | Status: COMPLETED | OUTPATIENT
Start: 2022-02-15 | End: 2022-02-15

## 2022-02-15 RX ADMIN — Medication 102 MILLIGRAM(S): at 14:12

## 2022-02-15 RX ADMIN — OXALIPLATIN 144 MILLIGRAM(S): 5 INJECTION, SOLUTION INTRAVENOUS at 15:36

## 2022-02-15 RX ADMIN — Medication 118 MILLIGRAM(S): at 14:11

## 2022-02-15 RX ADMIN — IRON SUCROSE 220 MILLIGRAM(S): 20 INJECTION, SOLUTION INTRAVENOUS at 14:12

## 2022-02-15 RX ADMIN — FAMOTIDINE 104 MILLIGRAM(S): 10 INJECTION INTRAVENOUS at 14:11

## 2022-02-15 NOTE — ASSESSMENT
[FreeTextEntry1] : 60 yo woman with T3N1 left sided colon moderately differentiated adenocarcinoma\par ECOG 0-1\par - Discussed adjuvant chemo: v offered FOLFOX vs XELOX but she chose XELOX for 3 months\par - s/p initial cycle of XelOx, started on 1.25.2022\par - c/w Capecitabine 1500mg in AM + 1000mg in PM daily for 2 weeks on , 1 week break every 21 days\par - c/w cycle 2 of Oxaliplatin IV every 3 weeks \par \par # Anemia, microcytic ; at least partially due to iron deficiency\par - c/w Venofer 200mg IV weekly x 5 doses ( dose #4 out of 5 today)\par \par RTC in 3 weeks with CBC, BMP, LFTs, CEA

## 2022-02-15 NOTE — HISTORY OF PRESENT ILLNESS
[Therapy: ___] : Therapy: [unfilled] [Cycle: ___] : Cycle: [unfilled] [de-identified] : Ms. WILFREDO SARKAR is a 61 year old female here today for evaluation and management of Colon Cancer.\par \par She was referred by GI, Dr. Becerra.  WILFREDO is a 61 year old F with PMHx including DM, who presents to clinic to establish care.   She originally presented to ER with chief complaint of abdominal pain x3 days, described as sharp in nature diffusely with no radiation or noted aggravating/alleviating factors.  Patient reported that she had not passed\par her bowels for 3 days prior and began to develop multiple episodes of NBNB emesis.  She is presently feeling well with no new complaints.  Patient denies fever, chills, nausea, vomiting, dyspnea, unintentional weight loss or bleeding.  Patient reports no pertinent personal or family history of malignancy or blood/clotting disorder.\par \par \par RADIOLOGIC WORKUP\par CT A/P (12.2.2021) IMPRESSION:Distal small bowel and and proximal large bowel obstruction to the level of the splenic flexure where there is a colonic mural nodule measuring 1.2 cm, concerning for primary neoplasm. Direct visualization with colonoscopy is suggested.Numerous bilobar hepatic hypodensities measuring simple fluid and likely reflecting cysts. However, in view of the above findings, metastatic disease cannot be excluded. Further evaluation with MRI with and without contrast may be of benefit.\par \par MRI ABDOMEN 11.16.21:  no evience of liver mets; hepatometaly with hepatic cystic dsease\par \par CT CHEST 10/26/21 5MM RLL NODULE\par \par LAB WORKUP\par (12.3.2021) WBC 5.94, Hgb 6.6, MCV 73.6, RDW 17.2, , Cr 0.5, eGFR 103, Calcium 7.5\par \par PATHOLOGY\par (see results section)\par \par HCM\par Colonoscopy done 12/3/2021 with Dr. Chance showed mass in splenic flexure\par Upper Endoscopy?\par COVID Vaccinated? [FreeTextEntry1] : Started Capecitabine + Oxaliplatin on 1.25.2022 [de-identified] : 2/15/22\par Patient is here for a follow-up visit for colon cancer, accompanied by daughter, Shruthi.  She is feeling well with no new complaints.  She is s/p initial cycle of XelOx, started on 1.25.2022.  Reviewed most recent CBC, which is stable.  Patient denies fever, chills, dyspnea, skin rash or bleeding.  She did acknowledge slight change in sensation in hands when exposed to cold.  She developed transient nausea and an episode of vomiting the day following last cycle of chemotherapy but did not have anti-emetics yet; they have anti-emetics stocked at home now.

## 2022-02-15 NOTE — REVIEW OF SYSTEMS
[Negative] : Allergic/Immunologic [Vomiting] : vomiting [Fever] : no fever [Eye Pain] : no eye pain [Dysphagia] : no dysphagia [Chest Pain] : no chest pain [Shortness Of Breath] : no shortness of breath [Abdominal Pain] : no abdominal pain [Diarrhea] : no diarrhea [Dysuria] : no dysuria [Joint Pain] : no joint pain [Confused] : no confusion [Suicidal] : not suicidal [Proptosis] : no proptosis [FreeTextEntry7] : episodic vomiting after chemotherapy, now using anti-emetics [de-identified] : slight change in sensation in hands following chemotherapy

## 2022-02-16 LAB — CEA SERPL-MCNC: 3 NG/ML

## 2022-02-25 ENCOUNTER — APPOINTMENT (OUTPATIENT)
Dept: INFUSION THERAPY | Facility: CLINIC | Age: 62
End: 2022-02-25

## 2022-02-25 ENCOUNTER — APPOINTMENT (OUTPATIENT)
Dept: HEMATOLOGY ONCOLOGY | Facility: CLINIC | Age: 62
End: 2022-02-25

## 2022-02-25 RX ORDER — IRON SUCROSE 20 MG/ML
200 INJECTION, SOLUTION INTRAVENOUS ONCE
Refills: 0 | Status: COMPLETED | OUTPATIENT
Start: 2022-02-25 | End: 2022-02-25

## 2022-02-25 RX ADMIN — IRON SUCROSE 220 MILLIGRAM(S): 20 INJECTION, SOLUTION INTRAVENOUS at 09:19

## 2022-03-03 DIAGNOSIS — C18.4 MALIGNANT NEOPLASM OF TRANSVERSE COLON: ICD-10-CM

## 2022-03-08 ENCOUNTER — LABORATORY RESULT (OUTPATIENT)
Age: 62
End: 2022-03-08

## 2022-03-08 ENCOUNTER — APPOINTMENT (OUTPATIENT)
Dept: INFUSION THERAPY | Facility: CLINIC | Age: 62
End: 2022-03-08
Payer: MEDICAID

## 2022-03-08 ENCOUNTER — APPOINTMENT (OUTPATIENT)
Dept: HEMATOLOGY ONCOLOGY | Facility: CLINIC | Age: 62
End: 2022-03-08
Payer: MEDICAID

## 2022-03-08 VITALS
SYSTOLIC BLOOD PRESSURE: 133 MMHG | RESPIRATION RATE: 16 BRPM | DIASTOLIC BLOOD PRESSURE: 71 MMHG | BODY MASS INDEX: 21.52 KG/M2 | WEIGHT: 114 LBS | HEIGHT: 61 IN | TEMPERATURE: 98 F | HEART RATE: 95 BPM

## 2022-03-08 LAB
ANION GAP SERPL CALC-SCNC: 12 MMOL/L
BUN SERPL-MCNC: 18 MG/DL
CALCIUM SERPL-MCNC: 9.5 MG/DL
CHLORIDE SERPL-SCNC: 106 MMOL/L
CO2 SERPL-SCNC: 22 MMOL/L
CREAT SERPL-MCNC: 0.5 MG/DL
EGFR: 105 ML/MIN/1.73M2
GLUCOSE SERPL-MCNC: 98 MG/DL
HCT VFR BLD CALC: 35.8 %
HGB BLD-MCNC: 11.8 G/DL
MCHC RBC-ENTMCNC: 27.3 PG
MCHC RBC-ENTMCNC: 33 G/DL
MCV RBC AUTO: 82.9 FL
PLATELET # BLD AUTO: 172 K/UL
PMV BLD: NORMAL
POTASSIUM SERPL-SCNC: 4.3 MMOL/L
RBC # BLD: 4.32 M/UL
RBC # FLD: 32.2 %
SODIUM SERPL-SCNC: 140 MMOL/L
WBC # FLD AUTO: 6.17 K/UL

## 2022-03-08 PROCEDURE — 99214 OFFICE O/P EST MOD 30 MIN: CPT

## 2022-03-08 RX ORDER — FAMOTIDINE 10 MG/ML
20 INJECTION INTRAVENOUS ONCE
Refills: 0 | Status: COMPLETED | OUTPATIENT
Start: 2022-03-08 | End: 2022-03-08

## 2022-03-08 RX ORDER — OXALIPLATIN 5 MG/ML
190 INJECTION, SOLUTION INTRAVENOUS ONCE
Refills: 0 | Status: COMPLETED | OUTPATIENT
Start: 2022-03-08 | End: 2022-03-08

## 2022-03-08 RX ORDER — DIPHENHYDRAMINE HCL 50 MG
50 CAPSULE ORAL ONCE
Refills: 0 | Status: COMPLETED | OUTPATIENT
Start: 2022-03-08 | End: 2022-03-08

## 2022-03-08 RX ORDER — IRON SUCROSE 20 MG/ML
200 INJECTION, SOLUTION INTRAVENOUS ONCE
Refills: 0 | Status: DISCONTINUED | OUTPATIENT
Start: 2022-03-08 | End: 2022-03-08

## 2022-03-08 RX ORDER — DEXAMETHASONE 0.5 MG/5ML
10 ELIXIR ORAL ONCE
Refills: 0 | Status: COMPLETED | OUTPATIENT
Start: 2022-03-08 | End: 2022-03-08

## 2022-03-08 RX ADMIN — Medication 102 MILLIGRAM(S): at 10:16

## 2022-03-08 RX ADMIN — Medication 118 MILLIGRAM(S): at 10:16

## 2022-03-08 RX ADMIN — OXALIPLATIN 144 MILLIGRAM(S): 5 INJECTION, SOLUTION INTRAVENOUS at 10:49

## 2022-03-08 RX ADMIN — FAMOTIDINE 104 MILLIGRAM(S): 10 INJECTION INTRAVENOUS at 10:16

## 2022-03-08 NOTE — REVIEW OF SYSTEMS
[Vomiting] : vomiting [Negative] : Allergic/Immunologic [Fever] : no fever [Eye Pain] : no eye pain [Dysphagia] : no dysphagia [Chest Pain] : no chest pain [Shortness Of Breath] : no shortness of breath [Abdominal Pain] : no abdominal pain [Diarrhea] : no diarrhea [Dysuria] : no dysuria [Joint Pain] : no joint pain [Confused] : no confusion [Suicidal] : not suicidal [Proptosis] : no proptosis [FreeTextEntry7] : episodic vomiting after chemotherapy, now using anti-emetics [de-identified] : slight change in sensation in hands following chemotherapy

## 2022-03-08 NOTE — HISTORY OF PRESENT ILLNESS
[Therapy: ___] : Therapy: [unfilled] [Cycle: ___] : Cycle: [unfilled] [de-identified] : Ms. WILFREDO SARKAR is a 61 year old female here today for evaluation and management of Colon Cancer.\par \par She was referred by GI, Dr. Becerra.  WILFREDO is a 61 year old F with PMHx including DM, who presents to clinic to establish care.   She originally presented to ER with chief complaint of abdominal pain x3 days, described as sharp in nature diffusely with no radiation or noted aggravating/alleviating factors.  Patient reported that she had not passed\par her bowels for 3 days prior and began to develop multiple episodes of NBNB emesis.  She is presently feeling well with no new complaints.  Patient denies fever, chills, nausea, vomiting, dyspnea, unintentional weight loss or bleeding.  Patient reports no pertinent personal or family history of malignancy or blood/clotting disorder.\par \par \par RADIOLOGIC WORKUP\par CT A/P (12.2.2021) IMPRESSION:Distal small bowel and and proximal large bowel obstruction to the level of the splenic flexure where there is a colonic mural nodule measuring 1.2 cm, concerning for primary neoplasm. Direct visualization with colonoscopy is suggested.Numerous bilobar hepatic hypodensities measuring simple fluid and likely reflecting cysts. However, in view of the above findings, metastatic disease cannot be excluded. Further evaluation with MRI with and without contrast may be of benefit.\par \par MRI ABDOMEN 11.16.21:  no evience of liver mets; hepatometaly with hepatic cystic dsease\par \par CT CHEST 10/26/21 5MM RLL NODULE\par \par LAB WORKUP\par (12.3.2021) WBC 5.94, Hgb 6.6, MCV 73.6, RDW 17.2, , Cr 0.5, eGFR 103, Calcium 7.5\par \par PATHOLOGY\par (see results section)\par \par HCM\par Colonoscopy done 12/3/2021 with Dr. Chance showed mass in splenic flexure\par Upper Endoscopy?\par COVID Vaccinated? [FreeTextEntry1] : Started Capecitabine + Oxaliplatin on 1.25.2022 [de-identified] : 2/15/22\par Patient is here for a follow-up visit for colon cancer, accompanied by daughter, Shruthi.  She is feeling well with no new complaints.  She is s/p initial cycle of XelOx, started on 1.25.2022.  Reviewed most recent CBC, which is stable.  Patient denies fever, chills, dyspnea, skin rash or bleeding.  She did acknowledge slight change in sensation in hands when exposed to cold.  She developed transient nausea and an episode of vomiting the day following last cycle of chemotherapy but did not have anti-emetics yet; they have anti-emetics stocked at home now.

## 2022-03-08 NOTE — ASSESSMENT
[FreeTextEntry1] : 60 yo woman with T3N1 left sided colon moderately differentiated adenocarcinoma\par ECOG 0-1\par - Discussed adjuvant chemo: v offered FOLFOX vs XELOX but she chose XELOX for 3 months\par - s/p initial cycle of XelOx, started on 1.25.2022\par - c/w Capecitabine 1500mg in AM + 1000mg in PM daily for 2 weeks on , 1 week break every 21 days\par - c/w cycle 3 of Oxaliplatin IV every 3 weeks 3/8/22\par \par # Anemia, microcytic ; at least partially due to iron deficiency\par - c/w Venofer 200mg IV weekly x 5 doses ( dose #4 out of 5 today)\par \par RTC in 3 weeks with CBC, BMP, LFTs, CEA

## 2022-03-09 LAB
ALBUMIN SERPL ELPH-MCNC: 4.4 G/DL
ALP BLD-CCNC: 95 U/L
ALT SERPL-CCNC: 38 U/L
AST SERPL-CCNC: 39 U/L
BILIRUB DIRECT SERPL-MCNC: <0.2 MG/DL
BILIRUB INDIRECT SERPL-MCNC: >0.3 MG/DL
BILIRUB SERPL-MCNC: 0.5 MG/DL
CEA SERPL-MCNC: 3.7 NG/ML
PROT SERPL-MCNC: 6.5 G/DL

## 2022-03-10 ENCOUNTER — NON-APPOINTMENT (OUTPATIENT)
Age: 62
End: 2022-03-10

## 2022-03-29 ENCOUNTER — APPOINTMENT (OUTPATIENT)
Dept: INFUSION THERAPY | Facility: CLINIC | Age: 62
End: 2022-03-29
Payer: MEDICAID

## 2022-03-29 ENCOUNTER — LABORATORY RESULT (OUTPATIENT)
Age: 62
End: 2022-03-29

## 2022-03-29 ENCOUNTER — APPOINTMENT (OUTPATIENT)
Dept: HEMATOLOGY ONCOLOGY | Facility: CLINIC | Age: 62
End: 2022-03-29
Payer: MEDICAID

## 2022-03-29 ENCOUNTER — OUTPATIENT (OUTPATIENT)
Dept: OUTPATIENT SERVICES | Facility: HOSPITAL | Age: 62
LOS: 1 days | Discharge: HOME | End: 2022-03-29

## 2022-03-29 VITALS
SYSTOLIC BLOOD PRESSURE: 121 MMHG | DIASTOLIC BLOOD PRESSURE: 60 MMHG | TEMPERATURE: 97.7 F | RESPIRATION RATE: 14 BRPM | WEIGHT: 115 LBS | HEIGHT: 61 IN | HEART RATE: 100 BPM | BODY MASS INDEX: 21.71 KG/M2

## 2022-03-29 DIAGNOSIS — C18.4 MALIGNANT NEOPLASM OF TRANSVERSE COLON: ICD-10-CM

## 2022-03-29 DIAGNOSIS — Z98.890 OTHER SPECIFIED POSTPROCEDURAL STATES: Chronic | ICD-10-CM

## 2022-03-29 LAB
HCT VFR BLD CALC: 36.8 %
HGB BLD-MCNC: 12.2 G/DL
MCHC RBC-ENTMCNC: 29 PG
MCHC RBC-ENTMCNC: 33.2 G/DL
MCV RBC AUTO: 87.6 FL
PLATELET # BLD AUTO: 154 K/UL
PMV BLD: 10.1 FL
RBC # BLD: 4.2 M/UL
RBC # FLD: NORMAL %
WBC # FLD AUTO: 8.19 K/UL

## 2022-03-29 PROCEDURE — 99214 OFFICE O/P EST MOD 30 MIN: CPT

## 2022-03-29 RX ORDER — CAPECITABINE 500 MG/1
500 TABLET ORAL DAILY
Qty: 70 | Refills: 3 | Status: DISCONTINUED | COMMUNITY
Start: 2022-01-18 | End: 2022-03-29

## 2022-03-29 RX ORDER — OXALIPLATIN 5 MG/ML
190 INJECTION, SOLUTION INTRAVENOUS ONCE
Refills: 0 | Status: COMPLETED | OUTPATIENT
Start: 2022-03-29 | End: 2022-03-29

## 2022-03-29 RX ORDER — FAMOTIDINE 10 MG/ML
20 INJECTION INTRAVENOUS ONCE
Refills: 0 | Status: COMPLETED | OUTPATIENT
Start: 2022-03-29 | End: 2022-03-29

## 2022-03-29 RX ORDER — ONDANSETRON 8 MG/1
8 TABLET ORAL EVERY 8 HOURS
Qty: 30 | Refills: 3 | Status: DISCONTINUED | COMMUNITY
Start: 2022-01-25 | End: 2022-03-29

## 2022-03-29 RX ORDER — DEXAMETHASONE 0.5 MG/5ML
10 ELIXIR ORAL ONCE
Refills: 0 | Status: COMPLETED | OUTPATIENT
Start: 2022-03-29 | End: 2022-03-29

## 2022-03-29 RX ORDER — DIPHENHYDRAMINE HCL 50 MG
50 CAPSULE ORAL ONCE
Refills: 0 | Status: COMPLETED | OUTPATIENT
Start: 2022-03-29 | End: 2022-03-29

## 2022-03-29 RX ORDER — ONDANSETRON 8 MG/1
8 TABLET ORAL EVERY 8 HOURS
Qty: 21 | Refills: 2 | Status: DISCONTINUED | COMMUNITY
Start: 2022-01-18 | End: 2022-03-29

## 2022-03-29 RX ORDER — PROCHLORPERAZINE MALEATE 10 MG/1
10 TABLET ORAL
Qty: 180 | Refills: 2 | Status: DISCONTINUED | COMMUNITY
Start: 2022-01-18 | End: 2022-03-29

## 2022-03-29 RX ADMIN — OXALIPLATIN 144 MILLIGRAM(S): 5 INJECTION, SOLUTION INTRAVENOUS at 14:00

## 2022-03-29 RX ADMIN — Medication 102 MILLIGRAM(S): at 13:02

## 2022-03-29 RX ADMIN — FAMOTIDINE 104 MILLIGRAM(S): 10 INJECTION INTRAVENOUS at 13:02

## 2022-03-29 RX ADMIN — Medication 118 MILLIGRAM(S): at 13:03

## 2022-03-29 NOTE — ASSESSMENT
[FreeTextEntry1] : 60 yo woman with T3N1 (Stage III) left sided colon moderately differentiated adenocarcinoma ; s/p robotic splenic flexure resection on 12/16/2021\par ECOG 0-1\par - Discussed adjuvant chemo: offered FOLFOX vs XELOX but she chose XELOX for 3 months\par - s/p initial cycle of XelOx, started on 1.25.2022\par - c/w Capecitabine 1500mg in AM + 1000mg in PM daily for 2 weeks on , 1 week break every 21 days  *LAST CYCLE*\par - c/w cycle 4 of Oxaliplatin IV every 3 weeks on 3/29 *LAST CYCLE*\par - she will need colonoscopy 1 year post surgery around 12/2022\par - will plan to order CT C/A/P at next visit , sooner if pain persists \par \par # Anemia, microcytic ; at least partially due to iron deficiency\par - c/w Venofer 200mg IV weekly x 5 doses , completed on 2.25.2022\par \par RTC in 3 weeks with CBC, BMP, LFTs, CEA , sooner if needed \par seen/examined w/ NP CDavie; note reviewed;case discussed; proceed w/ the last chemo cycle of XELOX; she c/o of right flank pain, vague; i suspect musculoskeletal rather than cancer related (she has low risk stage III cancer on adjuvant chemo and CEA is low; if pain is not improving within a week , she should infomr me ; willl consider imaging studies; no neuropahty

## 2022-03-29 NOTE — HISTORY OF PRESENT ILLNESS
[Therapy: ___] : Therapy: [unfilled] [Cycle: ___] : Cycle: [unfilled] [de-identified] : Ms. WILFREDO SARKAR is a 61 year old female here today for evaluation and management of Colon Cancer.\par \par She was referred by GI, Dr. Becerra.  WILFREDO is a 61 year old F with PMHx including DM, who presents to clinic to establish care.   She originally presented to ER with chief complaint of abdominal pain x3 days, described as sharp in nature diffusely with no radiation or noted aggravating/alleviating factors.  Patient reported that she had not passed\par her bowels for 3 days prior and began to develop multiple episodes of NBNB emesis.  She is presently feeling well with no new complaints.  Patient denies fever, chills, nausea, vomiting, dyspnea, unintentional weight loss or bleeding.  Patient reports no pertinent personal or family history of malignancy or blood/clotting disorder.\par \par \par RADIOLOGIC WORKUP\par CT A/P (12.2.2021) IMPRESSION:Distal small bowel and and proximal large bowel obstruction to the level of the splenic flexure where there is a colonic mural nodule measuring 1.2 cm, concerning for primary neoplasm. Direct visualization with colonoscopy is suggested.Numerous bilobar hepatic hypodensities measuring simple fluid and likely reflecting cysts. However, in view of the above findings, metastatic disease cannot be excluded. Further evaluation with MRI with and without contrast may be of benefit.\par \par MRI ABDOMEN 11.16.21:  no evience of liver mets; hepatometaly with hepatic cystic dsease\par \par CT CHEST 10/26/21 5MM RLL NODULE\par \par LAB WORKUP\par (12.3.2021) WBC 5.94, Hgb 6.6, MCV 73.6, RDW 17.2, , Cr 0.5, eGFR 103, Calcium 7.5\par \par PATHOLOGY\par (see results section)\par \par HCM\par Colonoscopy done 12/3/2021 with Dr. Chance showed mass in splenic flexure [FreeTextEntry1] : Started Capecitabine + Oxaliplatin on 1.25.2022 [de-identified] : 2/15/22\par Patient is here for a follow-up visit for colon cancer, accompanied by daughter, Shruthi.  She is feeling well with no new complaints.  She is s/p initial cycle of XelOx, started on 1.25.2022.  Reviewed most recent CBC, which is stable.  Patient denies fever, chills, dyspnea, skin rash or bleeding.  She did acknowledge slight change in sensation in hands when exposed to cold.  She developed transient nausea and an episode of vomiting the day following last cycle of chemotherapy but did not have anti-emetics yet; they have anti-emetics stocked at home now.  \par \par 3/29/22\par Patient is here for a follow-up visit for colon cancer, accompanied by daughter, Shruthi.  She is feeling well with no new complaints.  She is due for cycle 4 of XelOx, starting today 3/29.  Reviewed most recent CBC, which is stable.  CEA is 3.7ng/mL from 03/2022.  She does acknowledge slight change in sensation in hands when exposed to cold.  She also developed lateral R sided trunk/rib pain over the last few days, no worse with applied pressure and no clear triggers ; however, she was exercising recently and may have strained reaching upward.  Patient denies fever, chills, dyspnea, skin rash, vomiting or bleeding.

## 2022-03-29 NOTE — REVIEW OF SYSTEMS
[Negative] : Allergic/Immunologic [Fever] : no fever [Eye Pain] : no eye pain [Dysphagia] : no dysphagia [Chest Pain] : no chest pain [Shortness Of Breath] : no shortness of breath [Abdominal Pain] : no abdominal pain [Vomiting] : no vomiting [Diarrhea] : no diarrhea [Dysuria] : no dysuria [Joint Pain] : no joint pain [Confused] : no confusion [Suicidal] : not suicidal [Proptosis] : no proptosis [FreeTextEntry7] :  lateral R sided trunk pain over the last few days, no worse with applied pressure [de-identified] : slight change in sensation in hands following chemotherapy

## 2022-03-30 LAB
ALBUMIN SERPL ELPH-MCNC: 4.4 G/DL
ALP BLD-CCNC: 110 U/L
ALT SERPL-CCNC: 26 U/L
ANION GAP SERPL CALC-SCNC: 14 MMOL/L
AST SERPL-CCNC: 30 U/L
BILIRUB DIRECT SERPL-MCNC: <0.2 MG/DL
BILIRUB INDIRECT SERPL-MCNC: >0.4 MG/DL
BILIRUB SERPL-MCNC: 0.6 MG/DL
BUN SERPL-MCNC: 13 MG/DL
CALCIUM SERPL-MCNC: 9.5 MG/DL
CEA SERPL-MCNC: 3.7 NG/ML
CHLORIDE SERPL-SCNC: 104 MMOL/L
CO2 SERPL-SCNC: 23 MMOL/L
CREAT SERPL-MCNC: 0.6 MG/DL
EGFR: 101 ML/MIN/1.73M2
GLUCOSE SERPL-MCNC: 70 MG/DL
POTASSIUM SERPL-SCNC: 4 MMOL/L
PROT SERPL-MCNC: 7.2 G/DL
SODIUM SERPL-SCNC: 141 MMOL/L

## 2022-04-13 ENCOUNTER — APPOINTMENT (OUTPATIENT)
Dept: SURGERY | Facility: CLINIC | Age: 62
End: 2022-04-13
Payer: MEDICAID

## 2022-04-13 VITALS
TEMPERATURE: 96.7 F | BODY MASS INDEX: 22.09 KG/M2 | DIASTOLIC BLOOD PRESSURE: 80 MMHG | HEIGHT: 61 IN | WEIGHT: 117 LBS | OXYGEN SATURATION: 98 % | HEART RATE: 87 BPM | SYSTOLIC BLOOD PRESSURE: 120 MMHG

## 2022-04-13 PROCEDURE — 99213 OFFICE O/P EST LOW 20 MIN: CPT

## 2022-04-13 NOTE — HISTORY OF PRESENT ILLNESS
[FreeTextEntry1] : Patient is a 61F with DM who presents for post op appointment s/p robotic splenic flexure resection on 12/16/2021 for adenocarcinoma.  Pathology showed T3N1b moderately differentiated adenocarcinoma. Of note, patient had an obstructing lesion that required stent placement.  She completed 3 months of xelox.  She is tolerating a diet and moving her bowels.  She reports intermittent abdominal discomfort.  Patient denies fevers, chills, nausea, vomiting, abdominal pain, constipation, diarrhea, blood in his stool or unexpected weight loss.  Patient denies a family history of colon cancer rectal cancer or inflammatory bowel disease.  Patient's last colonoscopy was 10/12/21 with Dr. Hart that showed an endoscopically obstructing proximal transverse colon mass. \par

## 2022-04-13 NOTE — ASSESSMENT
[FreeTextEntry1] : 61F s/p robotic splenic flexure resection for Stage III adenocarcinoma s/p 3 months xelox\par \par Patient is doing well.  She will continue to follow up with Dr. Fajardo for images and lab work.  She will follow up with Dr. Hart for colonoscopy in 12/2022. We will see her back in 3 months.

## 2022-04-13 NOTE — CONSULT LETTER
[Dear  ___] : Dear  [unfilled], [Courtesy Letter:] : I had the pleasure of seeing your patient, [unfilled], in my office today. [Please see my note below.] : Please see my note below. [Consult Closing:] : Thank you very much for allowing me to participate in the care of this patient.  If you have any questions, please do not hesitate to contact me. [FreeTextEntry3] : Sincerely,\par \par Marcus Becerra MD, Colon and Rectal Surgery\par \par [employer] [facility]\par [address] [place] [address]\par [facility], 3rd Floor\par [place], [place] [address]\par Tel [phone] ext [phone]\par Fax [phone]\par  [DrMatilda  ___] : Dr. RIVERA

## 2022-04-13 NOTE — PHYSICAL EXAM
[Abdomen Masses] : No abdominal masses [Abdomen Tenderness] : ~T No ~M abdominal tenderness [No HSM] : no hepatosplenomegaly [Respiratory Effort] : normal respiratory effort [Normal Rate and Rhythm] : normal rate and rhythm [de-identified] : well healed port sites and Pfannenstiel incisions.  No erythema induration or purulence [de-identified] : awake, alert and in no acute distress

## 2022-04-20 ENCOUNTER — APPOINTMENT (OUTPATIENT)
Dept: HEMATOLOGY ONCOLOGY | Facility: CLINIC | Age: 62
End: 2022-04-20
Payer: MEDICAID

## 2022-04-20 ENCOUNTER — LABORATORY RESULT (OUTPATIENT)
Age: 62
End: 2022-04-20

## 2022-04-20 ENCOUNTER — RESULT REVIEW (OUTPATIENT)
Age: 62
End: 2022-04-20

## 2022-04-20 LAB
HCT VFR BLD CALC: 37.7 %
HGB BLD-MCNC: 12.5 G/DL
MCHC RBC-ENTMCNC: 31 PG
MCHC RBC-ENTMCNC: 33.2 G/DL
MCV RBC AUTO: 93.5 FL
PLATELET # BLD AUTO: 88 K/UL
PMV BLD: 10.5 FL
RBC # BLD: 4.03 M/UL
RBC # FLD: NORMAL %
WBC # FLD AUTO: 4.45 K/UL

## 2022-04-20 PROCEDURE — 99214 OFFICE O/P EST MOD 30 MIN: CPT

## 2022-04-20 NOTE — HISTORY OF PRESENT ILLNESS
[Therapy: ___] : Therapy: [unfilled] [Cycle: ___] : Cycle: [unfilled] [de-identified] : Ms. WILFREDO SARKAR is a 61 year old female here today for evaluation and management of Colon Cancer.\par \par She was referred by GI, Dr. Becerra.  WILFREDO is a 61 year old F with PMHx including DM, who presents to clinic to establish care.   She originally presented to ER with chief complaint of abdominal pain x3 days, described as sharp in nature diffusely with no radiation or noted aggravating/alleviating factors.  Patient reported that she had not passed\par her bowels for 3 days prior and began to develop multiple episodes of NBNB emesis.  She is presently feeling well with no new complaints.  Patient denies fever, chills, nausea, vomiting, dyspnea, unintentional weight loss or bleeding.  Patient reports no pertinent personal or family history of malignancy or blood/clotting disorder.\par \par \par RADIOLOGIC WORKUP\par CT A/P (12.2.2021) IMPRESSION:Distal small bowel and and proximal large bowel obstruction to the level of the splenic flexure where there is a colonic mural nodule measuring 1.2 cm, concerning for primary neoplasm. Direct visualization with colonoscopy is suggested.Numerous bilobar hepatic hypodensities measuring simple fluid and likely reflecting cysts. However, in view of the above findings, metastatic disease cannot be excluded. Further evaluation with MRI with and without contrast may be of benefit.\par \par MRI ABDOMEN 11.16.21:  no evience of liver mets; hepatometaly with hepatic cystic dsease\par \par CT CHEST 10/26/21 5MM RLL NODULE\par \par LAB WORKUP\par (12.3.2021) WBC 5.94, Hgb 6.6, MCV 73.6, RDW 17.2, , Cr 0.5, eGFR 103, Calcium 7.5\par \par PATHOLOGY\par (see results section)\par \par HCM\par Colonoscopy done 12/3/2021 with Dr. Chance showed mass in splenic flexure [FreeTextEntry1] : Started Capecitabine + Oxaliplatin on 1.25.2022 [de-identified] : 2/15/22\par Patient is here for a follow-up visit for colon cancer, accompanied by daughter, Shruthi.  She is feeling well with no new complaints.  She is s/p initial cycle of XelOx, started on 1.25.2022.  Reviewed most recent CBC, which is stable.  Patient denies fever, chills, dyspnea, skin rash or bleeding.  She did acknowledge slight change in sensation in hands when exposed to cold.  She developed transient nausea and an episode of vomiting the day following last cycle of chemotherapy but did not have anti-emetics yet; they have anti-emetics stocked at home now.  \par \par 3/29/22\par Patient is here for a follow-up visit for colon cancer, accompanied by daughter, Shruthi.  She is feeling well with no new complaints.  She is due for cycle 4 of XelOx, starting today 3/29.  Reviewed most recent CBC, which is stable.  CEA is 3.7ng/mL from 03/2022.  She does acknowledge slight change in sensation in hands when exposed to cold.  She also developed lateral R sided trunk/rib pain over the last few days, no worse with applied pressure and no clear triggers ; however, she was exercising recently and may have strained reaching upward.  Patient denies fever, chills, dyspnea, skin rash, vomiting or bleeding.  \par \par 4/20/22\par Pt is here for follow up. She is accompanied by daughter, Shruthi. She is feeling well with no new complaints. Denies numbness in her fingers. However, pt complains of lower back pain which is not new and presence prior to chemo. Otherwise, pt denies fever, chills, dyspnea, skin rash, vomiting or bleeding.

## 2022-04-20 NOTE — REVIEW OF SYSTEMS
[Negative] : Allergic/Immunologic [Fever] : no fever [Eye Pain] : no eye pain [Dysphagia] : no dysphagia [Chest Pain] : no chest pain [Shortness Of Breath] : no shortness of breath [Abdominal Pain] : no abdominal pain [Vomiting] : no vomiting [Diarrhea] : no diarrhea [Dysuria] : no dysuria [Joint Pain] : no joint pain [Confused] : no confusion [Suicidal] : not suicidal [Proptosis] : no proptosis [FreeTextEntry7] :  lateral R sided trunk pain over the last few days, no worse with applied pressure [de-identified] : slight change in sensation in hands following chemotherapy

## 2022-04-20 NOTE — ASSESSMENT
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 (Stage III) left sided colon moderately differentiated adenocarcinoma ; s/p robotic splenic flexure resection on 12/16/2021; ECOG 0-1\par - Discussed adjuvant chemo: offered FOLFOX vs XELOX but she chose XELOX for 3 months\par - s/p initial cycle of XelOx, started on 1.25.2022\par - s/p Capecitabine 1500mg in AM + 1000mg in PM daily for 2 weeks on, 1 week break every 21 days *LAST CYCLE*\par - s/p cycle 4 of Oxaliplatin IV every 3 weeks on 3/29 *LAST CYCLE*\par - she will need colonoscopy 1 year post surgery around 12/2022; pt and family is aware and schedule an appointment with Dr. Hart (gastroenterologist)\par - will order CT C/A/P today to be done in May to assess \par \par # Anemia, microcytic; at least partially due to iron deficiency\par - c/w Venofer 200mg IV weekly x 5 doses , completed on 2.25.2022\par - repeat CBC today\par \par Obtain CT C/A/P to be done end of May\par Repeat CBC, BMP, LFTs\par RTC in June, 2022\par \par seen/examned w/ [facility] fellow ,note reviewed; case discused\par 60 yo woman with colon cancer stage III s/p surgery 12/2021 and 3 months of adjuvant chemo (CapOx); needs CT CAP 05/2022 and colonoscopy by 12/2022; c/o back pain which is chronic: will see PMD and PT; has sweating which started after surgery; do not suspect malignancy recurrence: will get ct scan in fewweeks\par

## 2022-04-21 LAB
ALBUMIN SERPL ELPH-MCNC: 4.1 G/DL
ALP BLD-CCNC: 109 U/L
ALT SERPL-CCNC: 40 U/L
ANION GAP SERPL CALC-SCNC: 13 MMOL/L
AST SERPL-CCNC: 44 U/L
BILIRUB DIRECT SERPL-MCNC: <0.2 MG/DL
BILIRUB INDIRECT SERPL-MCNC: >0.3 MG/DL
BILIRUB SERPL-MCNC: 0.5 MG/DL
BUN SERPL-MCNC: 16 MG/DL
CALCIUM SERPL-MCNC: 9.2 MG/DL
CEA SERPL-MCNC: 4.3 NG/ML
CHLORIDE SERPL-SCNC: 105 MMOL/L
CO2 SERPL-SCNC: 24 MMOL/L
CREAT SERPL-MCNC: 0.7 MG/DL
EGFR: 97 ML/MIN/1.73M2
GLUCOSE SERPL-MCNC: 160 MG/DL
POTASSIUM SERPL-SCNC: 4.5 MMOL/L
PROT SERPL-MCNC: 6.6 G/DL
SODIUM SERPL-SCNC: 142 MMOL/L

## 2022-06-10 ENCOUNTER — LABORATORY RESULT (OUTPATIENT)
Age: 62
End: 2022-06-10

## 2022-06-10 ENCOUNTER — APPOINTMENT (OUTPATIENT)
Dept: HEMATOLOGY ONCOLOGY | Facility: CLINIC | Age: 62
End: 2022-06-10

## 2022-06-10 LAB
ALBUMIN SERPL ELPH-MCNC: 4.1 G/DL
ALP BLD-CCNC: 126 U/L
ALT SERPL-CCNC: 29 U/L
ANION GAP SERPL CALC-SCNC: 14 MMOL/L
AST SERPL-CCNC: 30 U/L
BILIRUB DIRECT SERPL-MCNC: <0.2 MG/DL
BILIRUB INDIRECT SERPL-MCNC: >0.2 MG/DL
BILIRUB SERPL-MCNC: 0.4 MG/DL
BUN SERPL-MCNC: 18 MG/DL
CALCIUM SERPL-MCNC: 9.8 MG/DL
CHLORIDE SERPL-SCNC: 102 MMOL/L
CO2 SERPL-SCNC: 23 MMOL/L
CREAT SERPL-MCNC: 0.7 MG/DL
EGFR: 97 ML/MIN/1.73M2
GLUCOSE SERPL-MCNC: 131 MG/DL
HCT VFR BLD CALC: 37.1 %
HGB BLD-MCNC: 12.6 G/DL
MCHC RBC-ENTMCNC: 32.7 PG
MCHC RBC-ENTMCNC: 34 G/DL
MCV RBC AUTO: 96.4 FL
PLATELET # BLD AUTO: 274 K/UL
PMV BLD: 10 FL
POTASSIUM SERPL-SCNC: 4.1 MMOL/L
PROT SERPL-MCNC: 6.7 G/DL
RBC # BLD: 3.85 M/UL
RBC # FLD: 13.4 %
SODIUM SERPL-SCNC: 139 MMOL/L
WBC # FLD AUTO: 7.75 K/UL

## 2022-06-13 LAB — CEA SERPL-MCNC: 2.7 NG/ML

## 2022-06-14 ENCOUNTER — OUTPATIENT (OUTPATIENT)
Dept: OUTPATIENT SERVICES | Facility: HOSPITAL | Age: 62
LOS: 1 days | Discharge: HOME | End: 2022-06-14
Payer: MEDICAID

## 2022-06-14 DIAGNOSIS — Z98.890 OTHER SPECIFIED POSTPROCEDURAL STATES: Chronic | ICD-10-CM

## 2022-06-14 DIAGNOSIS — C18.4 MALIGNANT NEOPLASM OF TRANSVERSE COLON: ICD-10-CM

## 2022-06-14 DIAGNOSIS — Z12.31 ENCOUNTER FOR SCREENING MAMMOGRAM FOR MALIGNANT NEOPLASM OF BREAST: ICD-10-CM

## 2022-06-14 PROCEDURE — 71260 CT THORAX DX C+: CPT | Mod: 26

## 2022-06-14 PROCEDURE — 77067 SCR MAMMO BI INCL CAD: CPT | Mod: 26

## 2022-06-14 PROCEDURE — 74177 CT ABD & PELVIS W/CONTRAST: CPT | Mod: 26

## 2022-06-14 PROCEDURE — 77063 BREAST TOMOSYNTHESIS BI: CPT | Mod: 26

## 2022-06-21 ENCOUNTER — APPOINTMENT (OUTPATIENT)
Dept: HEMATOLOGY ONCOLOGY | Facility: CLINIC | Age: 62
End: 2022-06-21
Payer: MEDICAID

## 2022-06-21 VITALS
SYSTOLIC BLOOD PRESSURE: 143 MMHG | HEART RATE: 71 BPM | WEIGHT: 120 LBS | DIASTOLIC BLOOD PRESSURE: 68 MMHG | RESPIRATION RATE: 16 BRPM | TEMPERATURE: 97 F | BODY MASS INDEX: 22.67 KG/M2

## 2022-06-21 PROCEDURE — 99214 OFFICE O/P EST MOD 30 MIN: CPT

## 2022-06-21 NOTE — ASSESSMENT
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 Hart (gastroenterologist) in October 2022\par \par # Anemia, microcytic; at least partially due to iron deficiency\par - c/w Venofer 200mg IV weekly x 5 doses , completed on 2.25.2022\par - repeat CBC shows hg 12.6 g/dL, will repeat iron studes\par \par Repeat CBC, BMP, LFTs, CEA, iron studies\par RTC in October 2022\par \par seen/examned w/ [facility]monc fellow ,note reviewed; case discused\par 63 yo woman with colon cancer stage III s/p surgery 12/2021 and 3 months of adjuvant chemo (CapOx); CT CAP JIN, colonoscopy by 12/2022\par

## 2022-06-21 NOTE — REVIEW OF SYSTEMS
[Negative] : Allergic/Immunologic [Fever] : no fever [Eye Pain] : no eye pain [Dysphagia] : no dysphagia [Chest Pain] : no chest pain [Shortness Of Breath] : no shortness of breath [Abdominal Pain] : no abdominal pain [Vomiting] : no vomiting [Diarrhea] : no diarrhea [Dysuria] : no dysuria [Joint Pain] : no joint pain [Confused] : no confusion [Suicidal] : not suicidal [Proptosis] : no proptosis [FreeTextEntry7] :  lateral R sided trunk pain over the last few days, no worse with applied pressure [de-identified] : slight change in sensation in hands following chemotherapy

## 2022-06-21 NOTE — HISTORY OF PRESENT ILLNESS
[Therapy: ___] : Therapy: [unfilled] [Cycle: ___] : Cycle: [unfilled] [de-identified] : Ms. WILFREDO SARKAR is a 61 year old female here today for evaluation and management of Colon Cancer.\par \par She was referred by GI, Dr. Becerra.  WILFREDO is a 61 year old F with PMHx including DM, who presents to clinic to establish care.   She originally presented to ER with chief complaint of abdominal pain x3 days, described as sharp in nature diffusely with no radiation or noted aggravating/alleviating factors.  Patient reported that she had not passed\par her bowels for 3 days prior and began to develop multiple episodes of NBNB emesis.  She is presently feeling well with no new complaints.  Patient denies fever, chills, nausea, vomiting, dyspnea, unintentional weight loss or bleeding.  Patient reports no pertinent personal or family history of malignancy or blood/clotting disorder.\par \par \par RADIOLOGIC WORKUP\par CT A/P (12.2.2021) IMPRESSION:Distal small bowel and and proximal large bowel obstruction to the level of the splenic flexure where there is a colonic mural nodule measuring 1.2 cm, concerning for primary neoplasm. Direct visualization with colonoscopy is suggested.Numerous bilobar hepatic hypodensities measuring simple fluid and likely reflecting cysts. However, in view of the above findings, metastatic disease cannot be excluded. Further evaluation with MRI with and without contrast may be of benefit.\par \par MRI ABDOMEN 11.16.21:  no evience of liver mets; hepatometaly with hepatic cystic dsease\par \par CT CHEST 10/26/21 5MM RLL NODULE\par \par LAB WORKUP\par (12.3.2021) WBC 5.94, Hgb 6.6, MCV 73.6, RDW 17.2, , Cr 0.5, eGFR 103, Calcium 7.5\par \par PATHOLOGY\par (see results section)\par \par HCM\par Colonoscopy done 12/3/2021 with Dr. Chance showed mass in splenic flexure [FreeTextEntry1] : Started Capecitabine + Oxaliplatin on 1.25.2022 [de-identified] : 2/15/22\par Patient is here for a follow-up visit for colon cancer, accompanied by daughter, Shruthi.  She is feeling well with no new complaints.  She is s/p initial cycle of XelOx, started on 1.25.2022.  Reviewed most recent CBC, which is stable.  Patient denies fever, chills, dyspnea, skin rash or bleeding.  She did acknowledge slight change in sensation in hands when exposed to cold.  She developed transient nausea and an episode of vomiting the day following last cycle of chemotherapy but did not have anti-emetics yet; they have anti-emetics stocked at home now.  \par \par 3/29/22\par Patient is here for a follow-up visit for colon cancer, accompanied by daughter, Shruthi.  She is feeling well with no new complaints.  She is due for cycle 4 of XelOx, starting today 3/29.  Reviewed most recent CBC, which is stable.  CEA is 3.7ng/mL from 03/2022.  She does acknowledge slight change in sensation in hands when exposed to cold.  She also developed lateral R sided trunk/rib pain over the last few days, no worse with applied pressure and no clear triggers ; however, she was exercising recently and may have strained reaching upward.  Patient denies fever, chills, dyspnea, skin rash, vomiting or bleeding.  \par \par 4/20/22\par Pt is here for follow up. She is accompanied by daughter, Shruthi. She is feeling well with no new complaints. Denies numbness in her fingers. However, pt complains of lower back pain which is not new and presence prior to chemo. Otherwise, pt denies fever, chills, dyspnea, skin rash, vomiting or bleeding.  \par \par 6/21/22\par Pt is here for followup\par CT CAP was negative for recurrence\par CEA is wnl\par No complaints today\par She is scheduled to see her GI October 2022\par

## 2022-07-01 ENCOUNTER — OUTPATIENT (OUTPATIENT)
Dept: OUTPATIENT SERVICES | Facility: HOSPITAL | Age: 62
LOS: 1 days | Discharge: HOME | End: 2022-07-01

## 2022-07-01 DIAGNOSIS — R92.8 OTHER ABNORMAL AND INCONCLUSIVE FINDINGS ON DIAGNOSTIC IMAGING OF BREAST: ICD-10-CM

## 2022-07-01 DIAGNOSIS — Z98.890 OTHER SPECIFIED POSTPROCEDURAL STATES: Chronic | ICD-10-CM

## 2022-07-01 PROCEDURE — G0279: CPT | Mod: 26

## 2022-07-01 PROCEDURE — 76642 ULTRASOUND BREAST LIMITED: CPT | Mod: 26,50

## 2022-07-01 PROCEDURE — 77066 DX MAMMO INCL CAD BI: CPT | Mod: 26

## 2022-07-12 NOTE — ASU PREOP CHECKLIST - DENTURES
Patient resolved from Transition of Care episode on 7/12/22. ACM/CTN was unsuccessful at contacting this patient today. Patient/family was provided the following resources and education related to COVID-19 during the initial call:                         Signs, symptoms and red flags related to COVID-19            CDC exposure and quarantine guidelines            [facility] exposure contact - [phone]            Contact for their local Department of Health                 Patient has not had any additional ED or hospital visits. No further outreach scheduled with this CTN/ACM. Episode of Care resolved. Patient has this CTN/ACM contact information if future needs arise.
no

## 2022-07-27 ENCOUNTER — APPOINTMENT (OUTPATIENT)
Dept: SURGERY | Facility: CLINIC | Age: 62
End: 2022-07-27

## 2022-07-27 VITALS
HEIGHT: 61 IN | DIASTOLIC BLOOD PRESSURE: 80 MMHG | WEIGHT: 118 LBS | BODY MASS INDEX: 22.28 KG/M2 | SYSTOLIC BLOOD PRESSURE: 120 MMHG | HEART RATE: 78 BPM | OXYGEN SATURATION: 98 % | TEMPERATURE: 97 F

## 2022-07-27 PROCEDURE — 99213 OFFICE O/P EST LOW 20 MIN: CPT

## 2022-07-29 NOTE — PHYSICAL EXAM
[Abdomen Masses] : No abdominal masses [Abdomen Tenderness] : ~T No ~M abdominal tenderness [No HSM] : no hepatosplenomegaly [Respiratory Effort] : normal respiratory effort [Normal Rate and Rhythm] : normal rate and rhythm [de-identified] : well healed port sites and Pfannenstiel incisions.  No erythema induration or purulence [de-identified] : awake, alert and in no acute distress

## 2022-07-29 NOTE — ASSESSMENT
[FreeTextEntry1] : 62F s/p robotic splenic flexure resection for Stage III adenocarcinoma s/p 3 months xelox\par \par Patient is doing well.  She will continue to follow up with Dr. Fajadro for images and lab work.  She will follow up with Dr. Hart for colonoscopy in 12/2022. We will see her back in 6 months.

## 2022-07-29 NOTE — HISTORY OF PRESENT ILLNESS
[FreeTextEntry1] : Patient is a 61F with DM who presents for post op appointment s/p robotic splenic flexure resection on 12/16/2021 for adenocarcinoma.  Pathology showed T3N1b moderately differentiated adenocarcinoma. Of note, patient had an obstructing lesion that required stent placement.  She completed 3 months of xelox.  She is tolerating a diet and moving her bowels. CEA 6/2022 was 2.7.  CTCAP in 6/2022 shows JIN.  Patient denies fevers, chills, nausea, vomiting, abdominal pain, constipation, diarrhea, blood in his stool or unexpected weight loss.  Patient denies a family history of colon cancer rectal cancer or inflammatory bowel disease.  Patient's last colonoscopy was 10/12/21 with Dr. Hart that showed an endoscopically obstructing proximal transverse colon mass. \par

## 2022-09-30 ENCOUNTER — APPOINTMENT (OUTPATIENT)
Dept: HEMATOLOGY ONCOLOGY | Facility: CLINIC | Age: 62
End: 2022-09-30

## 2022-09-30 LAB
BASOPHILS # BLD AUTO: 0.02 K/UL
BASOPHILS NFR BLD AUTO: 0.3 %
EOSINOPHIL # BLD AUTO: 0.08 K/UL
EOSINOPHIL NFR BLD AUTO: 1 %
HCT VFR BLD CALC: 39.9 %
HGB BLD-MCNC: 13.6 G/DL
IMM GRANULOCYTES NFR BLD AUTO: 0.3 %
LYMPHOCYTES # BLD AUTO: 3.2 K/UL
LYMPHOCYTES NFR BLD AUTO: 40.7 %
MAN DIFF?: NORMAL
MCHC RBC-ENTMCNC: 31.1 PG
MCHC RBC-ENTMCNC: 34.1 G/DL
MCV RBC AUTO: 91.3 FL
MONOCYTES # BLD AUTO: 0.53 K/UL
MONOCYTES NFR BLD AUTO: 6.7 %
NEUTROPHILS # BLD AUTO: 4.01 K/UL
NEUTROPHILS NFR BLD AUTO: 51 %
PLATELET # BLD AUTO: 264 K/UL
RBC # BLD: 4.37 M/UL
RBC # FLD: 12.4 %
WBC # FLD AUTO: 7.86 K/UL

## 2022-10-03 LAB
ANION GAP SERPL CALC-SCNC: 11 MMOL/L
BUN SERPL-MCNC: 16 MG/DL
CALCIUM SERPL-MCNC: 9.5 MG/DL
CEA SERPL-MCNC: 3.2 NG/ML
CHLORIDE SERPL-SCNC: 103 MMOL/L
CO2 SERPL-SCNC: 27 MMOL/L
CREAT SERPL-MCNC: 0.6 MG/DL
EGFR: 100 ML/MIN/1.73M2
FERRITIN SERPL-MCNC: 269 NG/ML
GLUCOSE SERPL-MCNC: 76 MG/DL
IRON SATN MFR SERPL: 19 %
IRON SERPL-MCNC: 59 UG/DL
POTASSIUM SERPL-SCNC: 4.6 MMOL/L
SODIUM SERPL-SCNC: 141 MMOL/L
TIBC SERPL-MCNC: 305 UG/DL
TRANSFERRIN SERPL-MCNC: 259 MG/DL
UIBC SERPL-MCNC: 246 UG/DL

## 2022-10-07 ENCOUNTER — APPOINTMENT (OUTPATIENT)
Dept: HEMATOLOGY ONCOLOGY | Facility: CLINIC | Age: 62
End: 2022-10-07

## 2022-10-07 ENCOUNTER — OUTPATIENT (OUTPATIENT)
Dept: OUTPATIENT SERVICES | Facility: HOSPITAL | Age: 62
LOS: 1 days | Discharge: HOME | End: 2022-10-07

## 2022-10-07 VITALS
TEMPERATURE: 98.1 F | RESPIRATION RATE: 18 BRPM | DIASTOLIC BLOOD PRESSURE: 73 MMHG | SYSTOLIC BLOOD PRESSURE: 137 MMHG | HEART RATE: 84 BPM | BODY MASS INDEX: 22.66 KG/M2 | HEIGHT: 61 IN | WEIGHT: 120 LBS

## 2022-10-07 DIAGNOSIS — D50.9 IRON DEFICIENCY ANEMIA, UNSPECIFIED: ICD-10-CM

## 2022-10-07 DIAGNOSIS — C18.4 MALIGNANT NEOPLASM OF TRANSVERSE COLON: ICD-10-CM

## 2022-10-07 DIAGNOSIS — Z98.890 OTHER SPECIFIED POSTPROCEDURAL STATES: Chronic | ICD-10-CM

## 2022-10-07 PROCEDURE — 99214 OFFICE O/P EST MOD 30 MIN: CPT

## 2022-10-07 NOTE — ASSESSMENT
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 Hart (gastroenterologist) later today \par - plan to repeat CT C/A/P with IVC around 01/2022 , sooner if any new/worrisome symptoms \par \par # Anemia, microcytic; at least partially due to iron deficiency\par - s/p Venofer 200mg IV weekly x 5 doses , completed on 2.25.2022\par - repeat CBC shows hg 13.6 g/dL ; iron stores are adequate at this time \par \par RTC in 2 months with CBC, BMP, LFTs, CEA, iron studies prior \par seen/examined w FELECIA Vera;note reviewed; case discussed\par colonoscopy to be done by 12/2022\par CT CAP in 3-4 months

## 2022-10-07 NOTE — HISTORY OF PRESENT ILLNESS
[de-identified] : Ms. WILFREDO SARKAR is a 61 year old female here today for evaluation and management of Colon Cancer.\par \par She was referred by GI, Dr. Becerra.  WILFREDO is a 61 year old F with PMHx including DM, who presents to clinic to establish care.   She originally presented to ER with chief complaint of abdominal pain x3 days, described as sharp in nature diffusely with no radiation or noted aggravating/alleviating factors.  Patient reported that she had not passed\par her bowels for 3 days prior and began to develop multiple episodes of NBNB emesis.  She is presently feeling well with no new complaints.  Patient denies fever, chills, nausea, vomiting, dyspnea, unintentional weight loss or bleeding.  Patient reports no pertinent personal or family history of malignancy or blood/clotting disorder.\par \par \par RADIOLOGIC WORKUP\par CT A/P (12.2.2021) IMPRESSION:Distal small bowel and and proximal large bowel obstruction to the level of the splenic flexure where there is a colonic mural nodule measuring 1.2 cm, concerning for primary neoplasm. Direct visualization with colonoscopy is suggested.Numerous bilobar hepatic hypodensities measuring simple fluid and likely reflecting cysts. However, in view of the above findings, metastatic disease cannot be excluded. Further evaluation with MRI with and without contrast may be of benefit.\par \par MRI ABDOMEN 11.16.21:  no evience of liver mets; hepatometaly with hepatic cystic dsease\par \par CT CHEST 10/26/21 5MM RLL NODULE\par \par LAB WORKUP\par (12.3.2021) WBC 5.94, Hgb 6.6, MCV 73.6, RDW 17.2, , Cr 0.5, eGFR 103, Calcium 7.5\par \par PATHOLOGY\par (see results section)\par \par HCM\par Colonoscopy done 12/3/2021 with Dr. Chance showed mass in splenic flexure [FreeTextEntry1] : Started Capecitabine + Oxaliplatin on 1.25.2022 - 03.29.2022 [de-identified] : 2/15/22\par Patient is here for a follow-up visit for colon cancer, accompanied by daughter, Shruthi.  She is feeling well with no new complaints.  She is s/p initial cycle of XelOx, started on 1.25.2022.  Reviewed most recent CBC, which is stable.  Patient denies fever, chills, dyspnea, skin rash or bleeding.  She did acknowledge slight change in sensation in hands when exposed to cold.  She developed transient nausea and an episode of vomiting the day following last cycle of chemotherapy but did not have anti-emetics yet; they have anti-emetics stocked at home now.  \par \par 3/29/22\par Patient is here for a follow-up visit for colon cancer, accompanied by daughter, Shruthi.  She is feeling well with no new complaints.  She is due for cycle 4 of XelOx, starting today 3/29.  Reviewed most recent CBC, which is stable.  CEA is 3.7ng/mL from 03/2022.  She does acknowledge slight change in sensation in hands when exposed to cold.  She also developed lateral R sided trunk/rib pain over the last few days, no worse with applied pressure and no clear triggers ; however, she was exercising recently and may have strained reaching upward.  Patient denies fever, chills, dyspnea, skin rash, vomiting or bleeding.  \par \par 4/20/22\par Pt is here for follow up. She is accompanied by daughter, Shruthi. She is feeling well with no new complaints. Denies numbness in her fingers. However, pt complains of lower back pain which is not new and presence prior to chemo. Otherwise, pt denies fever, chills, dyspnea, skin rash, vomiting or bleeding.  \par \par 6/21/22\par Pt is here for followup\par CT CAP was negative for recurrence\par CEA is wnl\par No complaints today\par She is scheduled to see her GI October 2022\par \par 10/7/22\par Patient is here for a follow-up visit for colon cancer, accompanied by daughter, Shruthi.  She is feeling well with no new complaints.  She is s/p 4 cycles of XelOx, completed in 03/2022.  Reviewed most recent CBC, which is stable.  CEA is stable and WNL from 09/2022.  Patient denies fever, chills, dyspnea, skin rash, vomiting, abdominal pain or bleeding.  She endorses chronic back pain, which is controlled by pain patch.  She still notes slight change in sensation in hands.

## 2022-10-07 NOTE — REVIEW OF SYSTEMS
[Negative] : Allergic/Immunologic [Fever] : no fever [Eye Pain] : no eye pain [Dysphagia] : no dysphagia [Chest Pain] : no chest pain [Shortness Of Breath] : no shortness of breath [Abdominal Pain] : no abdominal pain [Vomiting] : no vomiting [Diarrhea] : no diarrhea [Dysuria] : no dysuria [Joint Pain] : no joint pain [Confused] : no confusion [Suicidal] : not suicidal [Proptosis] : no proptosis [FreeTextEntry7] :  lateral R sided chronic back pain , follows with [facility]  [de-identified] : slight change in sensation in hands following chemotherapy , gradually improving

## 2022-10-17 ENCOUNTER — APPOINTMENT (OUTPATIENT)
Dept: NEUROSURGERY | Facility: CLINIC | Age: 62
End: 2022-10-17

## 2022-10-17 VITALS
WEIGHT: 120 LBS | DIASTOLIC BLOOD PRESSURE: 75 MMHG | BODY MASS INDEX: 22.66 KG/M2 | SYSTOLIC BLOOD PRESSURE: 118 MMHG | HEART RATE: 84 BPM | HEIGHT: 61 IN

## 2022-10-17 PROCEDURE — 99204 OFFICE O/P NEW MOD 45 MIN: CPT

## 2022-10-17 NOTE — HISTORY OF PRESENT ILLNESS
[de-identified] : Ms. Rios presents today as a 62 year old woman with a chief complaint of lower back pain. Her MRI of the lumbar spine was reviewed today. Impression of multilevel degenerative changes. Grade 1 anterolisthesis at L4-5, as well as severe central canal stenosis at L4-5. Multiple prominent T2 hyperintensities involving the right hepatic lobe, possibly compatible with renal cysts or hemangiomas. Patient is advised to discuss secondary hepatic findings with her primary. She has not yet trialed physical therapy or injection treatment. She presents for initial neurosurgical consult. She is not trialing any medications at this time. Pain increased when standing and walking for long periods of time. Pain relieved when bending over.

## 2022-10-17 NOTE — PHYSICAL EXAM
[de-identified] : Pain upon palpation of the lumbar spine, L4-5 level \par Pain upon extension of the lumbar spine, some relief on flexion  [de-identified] : MRI of the lumbar spine was reviewed today. Impression of multilevel degenerative changes. Grade 1 anterolisthesis at L4-5, as well as severe central canal stenosis at L4-5. Multiple prominent T2 hyperintensities involving the right hepatic lobe, possibly compatible with renal cysts or hemangiomas.

## 2022-10-17 NOTE — DISCUSSION/SUMMARY
[de-identified] : Ms. Rios presents today as a 62 year old woman with a chief complaint of lower back pain. Her MRI of the lumbar spine was reviewed today. Impression of multilevel degenerative changes. Grade 1 anterolisthesis at L4-5, as well as severe central canal stenosis at L4-5. I am prescribing the patient Celebrex 200mg Q12 and Gabapentin 200mg Q8. I recommend the patient see pain management for possible L4-5 lumbar injections. I will see her back 2 weeks following injection treatment.\par \par DILLON, Darci Cornell, attest that this documentation has been prepared under the direction and in the presence of Provider Stef Mas MD\par  \par Thank you for allowing me to assist in the care of this patient. \par  \par Stef Mas MD\par \par Board Certified , Neurosurgeon\par \par

## 2022-10-18 RX ORDER — GABAPENTIN 100 MG/1
100 CAPSULE ORAL 3 TIMES DAILY
Qty: 180 | Refills: 0 | Status: ACTIVE | COMMUNITY
Start: 2022-10-18 | End: 1900-01-01

## 2022-10-18 RX ORDER — CELECOXIB 200 MG/1
200 CAPSULE ORAL TWICE DAILY
Qty: 60 | Refills: 2 | Status: ACTIVE | COMMUNITY
Start: 2022-10-18 | End: 1900-01-01

## 2022-11-16 ENCOUNTER — APPOINTMENT (OUTPATIENT)
Dept: PAIN MANAGEMENT | Facility: CLINIC | Age: 62
End: 2022-11-16

## 2022-11-16 VITALS — DIASTOLIC BLOOD PRESSURE: 69 MMHG | HEART RATE: 73 BPM | SYSTOLIC BLOOD PRESSURE: 131 MMHG

## 2022-11-16 PROCEDURE — 99214 OFFICE O/P EST MOD 30 MIN: CPT

## 2022-11-16 NOTE — PHYSICAL EXAM
[de-identified] : GENERAL APPEARANCE OF PATIENT IS WELL DEVELOPED, WELL NOURISHED, BODY HABITUS NORMAL, WELL GROOMED, NO DEFORMITIES NOTED. \par Head - Atraumatic and Normocephalic \par Eyes, Nose, and Throat: External inspection of ears and nose are normal overall without scars, lesions, or masses noted. Assessment of hearing is normal\par Neck-Examination of neck shows no masses, overall appearance is normal, neck is symmetric, tracheal position is midline, no crepitus is noted. Examination of thyroid shows no enlargement, tenderness or masses\par Respiratory- Assessment of respiratory effort shows no intercostal retractions, no use of accessory muscles, unlabored breathing, and normal diaphragmatic movement.\par Cardiovascular- Examination of extremities show no edema or varicosities\par Musculoskeletal. Examination of gait is not antalgic and station is normal\par Inspection and palpation of digits and nails shows no clubbing, cyanosis, nodules, drainage, fluctuance, petechiae\par \par • Spine – good ROM in flexion. Pain on extension. \par \par \par • Neck- inspection and palpation shows no misalignment, asymmetry, crepitation, defects, tenderness, masses, effusions. ROM is normal without crepitation or contracture. No instability or subluxation or laxity is noted. No abnormal movements.\par \par \par • RUE- inspection and palpation shows no misalignment, asymmetry, crepitation, defects, tenderness, masses, effusions. ROM is normal without crepitation or contracture. No instability or subluxation or laxity is noted. No abnormal movements.\par \par \par • LUE- inspection and palpation shows no misalignment, asymmetry, crepitation, defects, tenderness, masses, effusions. ROM is normal without crepitation or contracture. No instability or subluxation or laxity is noted. No abnormal movements.\par \par \par • RLE- inspection and palpation shows no misalignment, asymmetry, crepitation, defects, tenderness, masses, effusions. ROM is normal without crepitation or contracture. No instability or subluxation or laxity is noted. No abnormal movements.\par \par \par • LLE- inspection and palpation shows no misalignment, asymmetry, crepitation, defects, tenderness, masses, effusions. ROM is normal without crepitation or contracture. No instability or subluxation or laxity is noted. No abnormal movements.\par \par \par • Skin- Inspection of skin and subcutaneous tissue shows no rashes, lesions or ulcers. Palpation of skin and subcutaneous tissue shows no rashes, no indurations, subcutaneous nodules or tightening.\par \par \par • Abdomen- Nontender\par \par \par • Neurologic- CN 2-12 are grossly intact. No sensory or motor deficits in the upper and lower extremities. Adequate strength in upper and lower extremities \par \par \par • Psychiatric- Patient’s judgment and insight are intact. Oriented to time, place and person. Recent and remote memory intact.\par

## 2022-11-16 NOTE — HISTORY OF PRESENT ILLNESS
[FreeTextEntry1] : HISTORY OF PRESENT ILLNESS: This is a 62 year old woman complaining of lower back pain. The patient has had this pain for 3 years. The pain started after no specific injury or event. Patient describes pain as moderate to severe. During the last month the pain has been nearly constant with symptoms worsening in no typical pattern. Pain described as sharp pressure-like.  Pain is not associated with numbness or weakness.  Pain is increased with standing, sitting and walking.  Pain is decreased with lying down and relaxation. Pain is not changed with coughing/sneezing and bowel movements. Bowel or bladder habits have not changed.\par  \par ACTIVITIES: Patient could walk 5 blocks before the pain starts. Patient can sit 30 minutes  before pain starts. Patient can stand 10 minutes before pain starts. The patient often lays down because of pain. Patient uses no assistive walking at this time. Patient has difficulty performing household chores and participating in recreational activities at this time.\par \par PRIOR PAIN TREATMENTS:  No relief with nerve block/injection and physical therapy\par \par PRIOR PAIN MEDICATIONS:  No prior pain medication\par \par Covid 19 - This in-office encounter has occurred during a Public Health Emergency (PHE). As required by law, due to the risk of respiratory-transmitted infectious diseases, our office provided additional materials, supplies and clinical staff to assist in keeping our patients, physicians and office staff safe during this health emergency.\par

## 2022-11-16 NOTE — ASSESSMENT
[FreeTextEntry1] : Ms. Kellen Rios is a 62 year old woman complaining of lower back pain. The patient has had this pain for 3 years. The pain started after no specific injury or event. Patient describes pain as moderate to severe. During the last month the pain has been nearly constant with symptoms worsening in no typical pattern. \par \par Patient had a MRI that shows a radicular component along with pain referred into the lower extremity. Patient has trialed rehab (Home exercise, physical therapy or chiropractic care) and medications I will schedule a left L4-5 SNRI.  Risk, benefits, pros and cons of procedure were explained to the patient using models and diagrams and their questions were answered.  \par \par The patient has severe anxiety of procedures that necessitates monitored anesthesia care (MAC). The procedure performed will be close to major nerves, arteries, and spinal cord and/or joint structures. Due to the proximity of these structures, we need the patient to be still during the procedure.  With the help of MAC, this will be safely achieved and decrease the risk of any complications.\par \par I, Shruthi Flowers, attest that this documentation has been prepared under the direction and in the presence of Provider Diann Lorenzana The documentation recorded by the scribe, in my presence, accurately reflects the service I personally performed, and the decisions made by me with my edits as appropriate.\par \par Best Regards, \par Parish Skinner D.O. \par Diplomat, American Board of Anesthesiology \par Diplomat, American Board of Pain Medicine \par Diplomat, American Board of Pain Management

## 2022-12-07 ENCOUNTER — APPOINTMENT (OUTPATIENT)
Dept: HEMATOLOGY ONCOLOGY | Facility: CLINIC | Age: 62
End: 2022-12-07

## 2022-12-07 ENCOUNTER — APPOINTMENT (OUTPATIENT)
Dept: PAIN MANAGEMENT | Facility: CLINIC | Age: 62
End: 2022-12-07
Payer: MEDICAID

## 2022-12-07 LAB
BASOPHILS # BLD AUTO: 0.04 K/UL
BASOPHILS NFR BLD AUTO: 0.6 %
EOSINOPHIL # BLD AUTO: 0.04 K/UL
EOSINOPHIL NFR BLD AUTO: 0.6 %
HCT VFR BLD CALC: 38.3 %
HGB BLD-MCNC: 12.8 G/DL
IMM GRANULOCYTES NFR BLD AUTO: 0.2 %
LYMPHOCYTES # BLD AUTO: 2.35 K/UL
LYMPHOCYTES NFR BLD AUTO: 35.9 %
MAN DIFF?: NORMAL
MCHC RBC-ENTMCNC: 31 PG
MCHC RBC-ENTMCNC: 33.4 G/DL
MCV RBC AUTO: 92.7 FL
MONOCYTES # BLD AUTO: 0.45 K/UL
MONOCYTES NFR BLD AUTO: 6.9 %
NEUTROPHILS # BLD AUTO: 3.66 K/UL
NEUTROPHILS NFR BLD AUTO: 55.8 %
PLATELET # BLD AUTO: 235 K/UL
RBC # BLD: 4.13 M/UL
RBC # FLD: 13.1 %
WBC # FLD AUTO: 6.55 K/UL

## 2022-12-07 PROCEDURE — 64483 NJX AA&/STRD TFRM EPI L/S 1: CPT | Mod: LT

## 2022-12-07 PROCEDURE — 93040 RHYTHM ECG WITH REPORT: CPT | Mod: 79

## 2022-12-07 PROCEDURE — 93770 DETERMINATION VENOUS PRESS: CPT

## 2022-12-07 PROCEDURE — 99152Z: CUSTOM

## 2022-12-07 NOTE — PROCEDURE
[FreeTextEntry1] : SELECTIVE TRANSFORAMINAL LUMBAR EPIDURAL NERVE ROOT INJECTION UNDER FLUOROSCOPY [FreeTextEntry3] : SELECTIVE TRANSFORAMINAL LUMBAR EPIDURAL NERVE ROOT INJECTION UNDER FLUOROSCOPY\par  \par Preoperative Diagnosis: Lumbar radiculopathy\par Postoperative Diagnosis:Same\par Procedure: Selective Left /L4-5Transforaminal Lumbar Epidural Nerve Root Injection under Fluoroscopy\par Physician: Parish Skinner D.O. \par Anesthesia: See nurses notes, IV sedation, Versed 2mg, Fentanyl 50 mcg \par \par \par Medical Necessity: Failure of conservative management.\par \par Indication for Fluoroscopy: This procedure requires the precise placement of the spinal needle into the specific paravertebral foramen. It is the only way to accurately and safely perform the injection.\par \par CONSENT: The possible complications including infection, bleeding, nerve damage, Hospital admission, death or failure of the procedure; though unusual, are theoretically possible. The patient was educated about the of the procedure and alternative therapies. All questions were answered and the patient freely gave consent to proceed.\par Monitoring: Patient had continuous blood pressure, EKG, and pulse oximetry throughout the case. See nurse's notes\par \par PROCEDURE NOTE:\par After obtaining written consent, the patient was placed on the fluoroscopic table in the prone position with the pillow placed under the hips. The lumbar area was prepped with Betadine solution and draped in the usual manner. A time out was performed. Cold spray was used to localize the area. The fluoroscope was used to identify the L4///L5 vertebral body on the AP projection. It was then rotated into an oblique projection until the superior articulating process of the L5 (inferior) vertebra is projected beneath the 6 o'clock position of the L4 (superior) vertebrae. The 22 gauge 3 1/2 or 5 inch needle was inserted in the skin at a point overlying the superior articulating process of the inferior vertebra and aimed for the 6 o'clock position of the superior vertebrae's pedicle. After the needle contacted bone, a lateral projection was obtained to insure that the needle tip was in proximity with the vertebral body. Paresthesias were not noted. One ml of Omnipaque 240 was injected and a neurogram was obtained. Following demonstration of the neurogram, 1 ml of Preservative free normal saline and 1 ml of Methylprednisolone (80 mg) was injected. The small volume and relatively high concentration was chosen to preserve selectivity and diagnostic value of the injection. There was no CSF or heme identified. There were no signs of, intravascular block or hypotension. The needle was removed intact. A band aid was place on the site.\par \par Epidurogram: The nerve root was observed in its outline on the neurogram. Distal and proximal spread was noted pointing away from epidural fibrosis/scarring.\par \par \par \par Complications: None. The patient tolerated the procedure well. \par \par Disposition: I have examined the patient and there are no new physical findings since the original presentation. Sensory and motor function were intact. The patient met discharge criteria see nurses notes. The discharge instruction sheet was reviewed and given to the patient. The patient was discharged home with a . If patient gets sustained relief will have patient do modified planks 3 times a day on carpet or yoga mat starting at 5 seconds building up to 1 minute without grimacing/Valsalva and walking. \par \par Comment: 1st SNRI today, depending on effectiveness would schedule 2nd SNRI in 1-2 weeks vs caudal epidural steroid vs follow up in office depending on insurances. Follow up office. Call if any problems\par \par This document was electronically signed by: \par Parish Skinner D.O.\par Diplomat, American Board of Anesthesiology\par Diplomat, American Board of Pain Medicine\par Diplomat, American Board of Pain Management\par \par

## 2022-12-08 LAB
ALBUMIN SERPL ELPH-MCNC: 4.4 G/DL
ALP BLD-CCNC: 109 U/L
ALT SERPL-CCNC: 38 U/L
ANION GAP SERPL CALC-SCNC: 10 MMOL/L
AST SERPL-CCNC: 25 U/L
BILIRUB DIRECT SERPL-MCNC: <0.2 MG/DL
BILIRUB INDIRECT SERPL-MCNC: >0.3 MG/DL
BILIRUB SERPL-MCNC: 0.5 MG/DL
BUN SERPL-MCNC: 24 MG/DL
CALCIUM SERPL-MCNC: 9.2 MG/DL
CEA SERPL-MCNC: 3 NG/ML
CHLORIDE SERPL-SCNC: 102 MMOL/L
CO2 SERPL-SCNC: 25 MMOL/L
CREAT SERPL-MCNC: 0.6 MG/DL
EGFR: 100 ML/MIN/1.73M2
GLUCOSE SERPL-MCNC: 172 MG/DL
POTASSIUM SERPL-SCNC: 4.7 MMOL/L
PROT SERPL-MCNC: 6.8 G/DL
SODIUM SERPL-SCNC: 137 MMOL/L

## 2022-12-09 ENCOUNTER — OUTPATIENT (OUTPATIENT)
Dept: OUTPATIENT SERVICES | Facility: HOSPITAL | Age: 62
LOS: 1 days | End: 2022-12-09

## 2022-12-09 ENCOUNTER — RESULT REVIEW (OUTPATIENT)
Age: 62
End: 2022-12-09

## 2022-12-09 ENCOUNTER — APPOINTMENT (OUTPATIENT)
Dept: HEMATOLOGY ONCOLOGY | Facility: CLINIC | Age: 62
End: 2022-12-09

## 2022-12-09 VITALS
OXYGEN SATURATION: 98 % | TEMPERATURE: 97.9 F | DIASTOLIC BLOOD PRESSURE: 87 MMHG | BODY MASS INDEX: 22.66 KG/M2 | WEIGHT: 120 LBS | RESPIRATION RATE: 16 BRPM | HEART RATE: 77 BPM | SYSTOLIC BLOOD PRESSURE: 154 MMHG | HEIGHT: 61 IN

## 2022-12-09 DIAGNOSIS — Z98.890 OTHER SPECIFIED POSTPROCEDURAL STATES: Chronic | ICD-10-CM

## 2022-12-09 PROCEDURE — 99214 OFFICE O/P EST MOD 30 MIN: CPT

## 2022-12-09 NOTE — REVIEW OF SYSTEMS
[Negative] : Allergic/Immunologic [Fever] : no fever [Eye Pain] : no eye pain [Dysphagia] : no dysphagia [Chest Pain] : no chest pain [Shortness Of Breath] : no shortness of breath [Abdominal Pain] : no abdominal pain [Vomiting] : no vomiting [Diarrhea] : no diarrhea [Dysuria] : no dysuria [Joint Pain] : no joint pain [Confused] : no confusion [Suicidal] : not suicidal [Proptosis] : no proptosis [FreeTextEntry7] :  lateral R sided chronic back pain , follows with [facility]  [de-identified] : slight change in sensation in hands following chemotherapy , gradually improving

## 2022-12-09 NOTE — ASSESSMENT
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 Hart (gastroenterologist) later today \par - plan to repeat CT C/A/P with IVC around 01/2022 , sooner if any new/worrisome symptoms \par \par # Anemia, microcytic; at least partially due to iron deficiency\par - s/p Venofer 200mg IV weekly x 5 doses , completed on 2.25.2022\par - repeat CBC shows hg 13.6 g/dL ; iron stores are adequate at this time \par \par RTC in 2 months with CBC, BMP, LFTs, CEA, iron studies prior \par \par colonoscopy 11/2022 reviewed; no signs of cancer recurrence\par CEA WNL\par CT CAP 01/2023\par RTC 3 mos

## 2022-12-09 NOTE — HISTORY OF PRESENT ILLNESS
[de-identified] : Ms. WILFREDO SARKAR is a 61 year old female here today for evaluation and management of Colon Cancer.\par \par She was referred by GI, Dr. Becerra.  WILFREDO is a 61 year old F with PMHx including DM, who presents to clinic to establish care.   She originally presented to ER with chief complaint of abdominal pain x3 days, described as sharp in nature diffusely with no radiation or noted aggravating/alleviating factors.  Patient reported that she had not passed\par her bowels for 3 days prior and began to develop multiple episodes of NBNB emesis.  She is presently feeling well with no new complaints.  Patient denies fever, chills, nausea, vomiting, dyspnea, unintentional weight loss or bleeding.  Patient reports no pertinent personal or family history of malignancy or blood/clotting disorder.\par \par \par RADIOLOGIC WORKUP\par CT A/P (12.2.2021) IMPRESSION:Distal small bowel and and proximal large bowel obstruction to the level of the splenic flexure where there is a colonic mural nodule measuring 1.2 cm, concerning for primary neoplasm. Direct visualization with colonoscopy is suggested.Numerous bilobar hepatic hypodensities measuring simple fluid and likely reflecting cysts. However, in view of the above findings, metastatic disease cannot be excluded. Further evaluation with MRI with and without contrast may be of benefit.\par \par MRI ABDOMEN 11.16.21:  no evience of liver mets; hepatometaly with hepatic cystic dsease\par \par CT CHEST 10/26/21 5MM RLL NODULE\par \par LAB WORKUP\par (12.3.2021) WBC 5.94, Hgb 6.6, MCV 73.6, RDW 17.2, , Cr 0.5, eGFR 103, Calcium 7.5\par \par PATHOLOGY\par (see results section)\par \par HCM\par Colonoscopy done 12/3/2021 with Dr. Chance showed mass in splenic flexure [FreeTextEntry1] : Started Capecitabine + Oxaliplatin on 1.25.2022 - 03.29.2022 [de-identified] : 2/15/22\par Patient is here for a follow-up visit for colon cancer, accompanied by daughter, Shruthi.  She is feeling well with no new complaints.  She is s/p initial cycle of XelOx, started on 1.25.2022.  Reviewed most recent CBC, which is stable.  Patient denies fever, chills, dyspnea, skin rash or bleeding.  She did acknowledge slight change in sensation in hands when exposed to cold.  She developed transient nausea and an episode of vomiting the day following last cycle of chemotherapy but did not have anti-emetics yet; they have anti-emetics stocked at home now.  \par \par 3/29/22\par Patient is here for a follow-up visit for colon cancer, accompanied by daughter, Shruthi.  She is feeling well with no new complaints.  She is due for cycle 4 of XelOx, starting today 3/29.  Reviewed most recent CBC, which is stable.  CEA is 3.7ng/mL from 03/2022.  She does acknowledge slight change in sensation in hands when exposed to cold.  She also developed lateral R sided trunk/rib pain over the last few days, no worse with applied pressure and no clear triggers ; however, she was exercising recently and may have strained reaching upward.  Patient denies fever, chills, dyspnea, skin rash, vomiting or bleeding.  \par \par 4/20/22\par Pt is here for follow up. She is accompanied by daughter, Shruthi. She is feeling well with no new complaints. Denies numbness in her fingers. However, pt complains of lower back pain which is not new and presence prior to chemo. Otherwise, pt denies fever, chills, dyspnea, skin rash, vomiting or bleeding.  \par \par 6/21/22\par Pt is here for followup\par CT CAP was negative for recurrence\par CEA is wnl\par No complaints today\par She is scheduled to see her GI October 2022\par \par 10/7/22\par Patient is here for a follow-up visit for colon cancer, accompanied by daughter, Shruthi.  She is feeling well with no new complaints.  She is s/p 4 cycles of XelOx, completed in 03/2022.  Reviewed most recent CBC, which is stable.  CEA is stable and WNL from 09/2022.  Patient denies fever, chills, dyspnea, skin rash, vomiting, abdominal pain or bleeding.  She endorses chronic back pain, which is controlled by pain patch.  She still notes slight change in sensation in hands.  \par \par 12/9/22\par

## 2022-12-12 DIAGNOSIS — D50.9 IRON DEFICIENCY ANEMIA, UNSPECIFIED: ICD-10-CM

## 2022-12-12 DIAGNOSIS — C18.4 MALIGNANT NEOPLASM OF TRANSVERSE COLON: ICD-10-CM

## 2022-12-30 ENCOUNTER — OUTPATIENT (OUTPATIENT)
Dept: OUTPATIENT SERVICES | Facility: HOSPITAL | Age: 62
LOS: 1 days | Discharge: HOME | End: 2022-12-30
Payer: MEDICAID

## 2022-12-30 ENCOUNTER — RESULT REVIEW (OUTPATIENT)
Age: 62
End: 2022-12-30

## 2022-12-30 DIAGNOSIS — Z98.890 OTHER SPECIFIED POSTPROCEDURAL STATES: Chronic | ICD-10-CM

## 2022-12-30 DIAGNOSIS — C18.4 MALIGNANT NEOPLASM OF TRANSVERSE COLON: ICD-10-CM

## 2022-12-30 PROCEDURE — 74177 CT ABD & PELVIS W/CONTRAST: CPT | Mod: 26

## 2022-12-30 PROCEDURE — 71260 CT THORAX DX C+: CPT | Mod: 26

## 2023-01-11 ENCOUNTER — APPOINTMENT (OUTPATIENT)
Dept: PAIN MANAGEMENT | Facility: CLINIC | Age: 63
End: 2023-01-11
Payer: MEDICAID

## 2023-01-11 VITALS
WEIGHT: 120 LBS | DIASTOLIC BLOOD PRESSURE: 86 MMHG | HEIGHT: 61 IN | SYSTOLIC BLOOD PRESSURE: 149 MMHG | HEART RATE: 74 BPM | BODY MASS INDEX: 22.66 KG/M2

## 2023-01-11 PROCEDURE — 99213 OFFICE O/P EST LOW 20 MIN: CPT

## 2023-01-12 NOTE — HISTORY OF PRESENT ILLNESS
[FreeTextEntry1] : HISTORY OF PRESENT ILLNESS: This is a 62 year old woman complaining of lower back pain. The patient has had this pain for 3 years. The pain started after no specific injury or event. Patient describes pain as moderate to severe. During the last month the pain has been nearly constant with symptoms worsening in no typical pattern. Pain described as sharp pressure-like.  Pain is not associated with numbness or weakness.  Pain is increased with standing, sitting and walking.  Pain is decreased with lying down and relaxation. Pain is not changed with coughing/sneezing and bowel movements. Bowel or bladder habits have not changed.\par  \par ACTIVITIES: Patient could walk 5 blocks before the pain starts. Patient can sit 30 minutes  before pain starts. Patient can stand 10 minutes before pain starts. The patient often lays down because of pain. Patient uses no assistive walking at this time. Patient has difficulty performing household chores and participating in recreational activities at this time.\par \par PRIOR PAIN TREATMENTS:  No relief with nerve block/injection and physical therapy\par \par PRIOR PAIN MEDICATIONS:  No prior pain medication\par \par PRESENTING TODAY: In revisit encounter in regard to her continued lower back pain. Pain remains unchanged in severity or distribution since her last encounter. She is status post Left L4-5 SNRI on 12/7/22 which only provided her about 60% relief for one week. \par \par Covid 19 - This in-office encounter has occurred during a Public Health Emergency (PHE). As required by law, due to the risk of respiratory-transmitted infectious diseases, our office provided additional materials, supplies and clinical staff to assist in keeping our patients, physicians and office staff safe during this health emergency.\par

## 2023-01-12 NOTE — ASSESSMENT
[FreeTextEntry1] : Ms. Kellen Rios is a 62 year old woman complaining of lower back pain. The patient has had this pain for 3 years. The pain started after no specific injury or event. Patient describes pain as moderate to severe. During the last month the pain has been nearly constant with symptoms worsening in no typical pattern. She is status post Left L4-5 SNRI on 12/7/22 which only provided her about 40%relief for one week. \par \par Patient had a MRI that shows a radicular component along with pain referred into the lower extremity. Patient has trialed rehab (Home exercise, physical therapy or chiropractic care) and medications. I will schedule a caudal epidural steroid injection.\par \par The patient has severe anxiety of procedures that necessitates monitored anesthesia care (MAC). The procedure performed will be close to major nerves, arteries, and spinal cord and/or joint structures. Due to the proximity of these structures, we need the patient to be still during the procedure.  With the help of MAC, this will be safely achieved and decrease the risk of any complications.\par \par I, Shruthi Flowers, attest that this documentation has been prepared under the direction and in the presence of Provider Diann Lorenzana The documentation recorded by the scribe, in my presence, accurately reflects the service I personally performed, and the decisions made by me with my edits as appropriate.\par \par Best Regards, \par CRISTA Castillo.ELDA. \par Diplomat, American Board of Anesthesiology \par Diplomat, American Board of Pain Medicine \par Diplomat, American Board of Pain Management

## 2023-01-12 NOTE — PHYSICAL EXAM
[de-identified] : GENERAL APPEARANCE OF PATIENT IS WELL DEVELOPED, WELL NOURISHED, BODY HABITUS NORMAL, WELL GROOMED, NO DEFORMITIES NOTED. \par Head - Atraumatic and Normocephalic \par Eyes, Nose, and Throat: External inspection of ears and nose are normal overall without scars, lesions, or masses noted. Assessment of hearing is normal\par Neck-Examination of neck shows no masses, overall appearance is normal, neck is symmetric, tracheal position is midline, no crepitus is noted. Examination of thyroid shows no enlargement, tenderness or masses\par Respiratory- Assessment of respiratory effort shows no intercostal retractions, no use of accessory muscles, unlabored breathing, and normal diaphragmatic movement.\par Cardiovascular- Examination of extremities show no edema or varicosities\par Musculoskeletal. Examination of gait is not antalgic and station is normal\par Inspection and palpation of digits and nails shows no clubbing, cyanosis, nodules, drainage, fluctuance, petechiae\par \par • Spine – good ROM in flexion. Pain on extension. \par \par \par • Neck- inspection and palpation shows no misalignment, asymmetry, crepitation, defects, tenderness, masses, effusions. ROM is normal without crepitation or contracture. No instability or subluxation or laxity is noted. No abnormal movements.\par \par \par • RUE- inspection and palpation shows no misalignment, asymmetry, crepitation, defects, tenderness, masses, effusions. ROM is normal without crepitation or contracture. No instability or subluxation or laxity is noted. No abnormal movements.\par \par \par • LUE- inspection and palpation shows no misalignment, asymmetry, crepitation, defects, tenderness, masses, effusions. ROM is normal without crepitation or contracture. No instability or subluxation or laxity is noted. No abnormal movements.\par \par \par • RLE- inspection and palpation shows no misalignment, asymmetry, crepitation, defects, tenderness, masses, effusions. ROM is normal without crepitation or contracture. No instability or subluxation or laxity is noted. No abnormal movements.\par \par \par • LLE- inspection and palpation shows no misalignment, asymmetry, crepitation, defects, tenderness, masses, effusions. ROM is normal without crepitation or contracture. No instability or subluxation or laxity is noted. No abnormal movements.\par \par \par • Skin- Inspection of skin and subcutaneous tissue shows no rashes, lesions or ulcers. Palpation of skin and subcutaneous tissue shows no rashes, no indurations, subcutaneous nodules or tightening.\par \par \par • Abdomen- Nontender\par \par \par • Neurologic- CN 2-12 are grossly intact. No sensory or motor deficits in the upper and lower extremities. Adequate strength in upper and lower extremities \par \par \par • Psychiatric- Patient’s judgment and insight are intact. Oriented to time, place and person. Recent and remote memory intact.\par

## 2023-01-24 ENCOUNTER — APPOINTMENT (OUTPATIENT)
Dept: SURGERY | Facility: CLINIC | Age: 63
End: 2023-01-24
Payer: MEDICAID

## 2023-01-24 VITALS
HEART RATE: 80 BPM | BODY MASS INDEX: 23.41 KG/M2 | SYSTOLIC BLOOD PRESSURE: 122 MMHG | DIASTOLIC BLOOD PRESSURE: 84 MMHG | TEMPERATURE: 96.9 F | HEIGHT: 61 IN | OXYGEN SATURATION: 98 % | WEIGHT: 124 LBS

## 2023-01-24 PROCEDURE — 99213 OFFICE O/P EST LOW 20 MIN: CPT

## 2023-01-25 ENCOUNTER — APPOINTMENT (OUTPATIENT)
Dept: PAIN MANAGEMENT | Facility: CLINIC | Age: 63
End: 2023-01-25

## 2023-01-27 NOTE — HISTORY OF PRESENT ILLNESS
[FreeTextEntry1] : Patient is a 61F with DM who presents for post op appointment s/p robotic splenic flexure resection on 12/16/2021 for adenocarcinoma. Pathology showed T3N1b moderately differentiated adenocarcinoma. Of note, patient had an obstructing lesion that required stent placement. She completed 3 months of xelox. She is tolerating a diet and moving her bowels. CEA level checked on December 8th, was 3.0. CT scan of the chest, abdomen, and pelvis on December 30th showed no evidence of disease. There's a nonspecific 2.3 cm nodule in the anterior pelvic mesentery that will be observed. Her most recent Colonoscopy in November 2022 showed no evidence of recurrence. Patient denies fevers, chills, nausea, vomiting, abdominal pain, constipation, diarrhea, blood in his stool or unexpected weight loss. Patient denies a family history of colon cancer rectal cancer or inflammatory bowel disease. Patient's last colonoscopy was 10/12/21 with Dr. Hart that showed an endoscopically obstructing proximal transverse colon mass.

## 2023-01-27 NOTE — ASSESSMENT
[FreeTextEntry1] : 62-year-old female S/P Robotics Splenic Flexure Resection for Stage 3 Adenocarcinoma, S/P 3 months Xelox with no evidence of disease.  \par \par Patient is doing well; she will continue to follow-up with Dr. Fajardo for images and lab work. She will require another Colonoscopy with Dr. Mas in 2025. Patient and her daughter would like follow-up with Dr. Fajardo only. She will return to the office as needed.

## 2023-01-27 NOTE — PHYSICAL EXAM
[No HSM] : no hepatosplenomegaly [Respiratory Effort] : normal respiratory effort [Normal Rate and Rhythm] : normal rate and rhythm [Abdomen Masses] : No abdominal masses [Abdomen Tenderness] : ~T No ~M abdominal tenderness [de-identified] :  well healed port sites and Pfannenstiel incisions. No erythema induration or purulence. [de-identified] : awake, alert and in no acute distress.

## 2023-01-27 NOTE — ADDENDUM
[FreeTextEntry1] : I, Kathleen Claros assisted in documentation on 01/24/2023 acting as a scribe for Dr. Marcus Becerra.\par

## 2023-01-30 ENCOUNTER — APPOINTMENT (OUTPATIENT)
Dept: NEUROSURGERY | Facility: CLINIC | Age: 63
End: 2023-01-30
Payer: MEDICAID

## 2023-01-30 PROCEDURE — 99214 OFFICE O/P EST MOD 30 MIN: CPT

## 2023-01-30 NOTE — PHYSICAL EXAM
[de-identified] : Pain upon palpation of the lumbar spine, L4-5 level \par Pain upon extension of the lumbar spine, some relief on flexion \par Slight Kyphotic Gait  [de-identified] : MRI of the lumbar spine was reviewed today. Impression of multilevel degenerative changes. Grade 1 anterolisthesis at L4-5, as well as severe central canal stenosis at L4-5. Multiple prominent T2 hyperintensities involving the right hepatic lobe, possibly compatible with renal cysts or hemangiomas.

## 2023-01-30 NOTE — DISCUSSION/SUMMARY
[de-identified] : Ms. Rios presents today as a 62 year old woman with a chief complaint of lower back pain.  Pain increased when standing and walking for long periods of time at about the L5-1 level, worse on the left than right. She does have some pain down the left leg, L5 distribution, only when she stands. She will continue with Celebrex 200mg Q12 and Gabapentin 200mg Q8. She is scheduled for an additional lumbar injection mid February 2023 by pain management which I believe she should proceed with. She remains hesitant for surgical intervention at this time. I will see her back 2 weeks s/p injection.\par \par DILLON, Darci Cornell, attest that this documentation has been prepared under the direction and in the presence of Provider Stef Mas MD\par  \par Thank you for allowing me to assist in the care of this patient. \par  \par Stef Mas MD\par \par Board Certified , Neurosurgeon\par \par

## 2023-01-30 NOTE — HISTORY OF PRESENT ILLNESS
[de-identified] : Ms. Rios presents today as a 62 year old woman with a chief complaint of lower back pain. Her MRI of the lumbar spine was re-reviewed today. Impression of multilevel degenerative changes. Grade 1 anterolisthesis at L4-5, as well as severe central canal stenosis at L4-5. Multiple prominent T2 hyperintensities involving the right hepatic lobe, possibly compatible with renal cysts or hemangiomas. Patient was last advised to discuss secondary hepatic findings with her primary. Pain increased when standing and walking for long periods of time at about the L5-1 level, worse on the left than right. She does have some pain down the left leg, L5 distribution, only when she stands. Pain relieved when bending over. She walks with a slight kyphotic posture. She remains hesitant to surgical intervention at this time. She has since seen pain management where she underwent a caudal injection 01/25/23. She is set to undergo an additional injection in February 2023. She has been taking Celebrex 200mg Q12 and Gabapentin 200mg with some relief of symptoms.

## 2023-02-15 ENCOUNTER — APPOINTMENT (OUTPATIENT)
Dept: PAIN MANAGEMENT | Facility: CLINIC | Age: 63
End: 2023-02-15
Payer: MEDICAID

## 2023-02-15 PROCEDURE — 93040 RHYTHM ECG WITH REPORT: CPT | Mod: 79

## 2023-02-15 PROCEDURE — 93770 DETERMINATION VENOUS PRESS: CPT

## 2023-02-15 PROCEDURE — 62323 NJX INTERLAMINAR LMBR/SAC: CPT

## 2023-02-15 PROCEDURE — 99152Z: CUSTOM

## 2023-02-16 NOTE — PROCEDURE
[FreeTextEntry1] : CAUDAL EPIDURAL STEROID INJECTION [FreeTextEntry3] : CAUDAL EPIDURAL STEROID INJECTION\par  \par Preoperative Diagnosis: Lumbar Radiculopathy\par Postoperative Diagnosis: Lumbar Radiculopathy\par Procedure: Caudal epidural injection under fluoroscopic guidance\par Physician: Parish Skinner D.O.\par Anesthesia: IV Sedation Versed 2mg, Fentanyl 50mcg \par \par Medical Necessity: Failure of conservative management.\par Indication for Fluoroscopy: This procedure requires the precise placement of the spinal needle into the epidural space. It is the only way to accurately and safely perform the injection.\par \par CONSENT: The possible complications including infection, bleeding, nerve damage, hospital admission or failure of the procedure; though unusual, are theoretically possible. The patient was educated about the of the procedure and alternative therapies. All questions were answered and the patient freely gave consent to proceed.\par Monitoring: Patient had continuous blood pressure, EKG, and pulse oximetry throughout the case. See nurse's notes.\par \par PROCEDURE NOTE: \par After obtaining written consent, the patient was placed in the prone position with a pillow under the pelvis. Multiple fluoroscopic views of the sacrum-AP & Lateral- were obtained. The lower back and upper gluteal region were prepped with Betadine and draped in the sterile fashion. A time out was performed. The skin over the sacral hiatus was infiltrated with local anesthetic and a 22 gauge 3 ½ inch needle was inserted. The angle of the needle was lowered until it was felt to penetrate the sacrococcygeal ligament at which time the needle was advanced without resistance. Fluoroscopy confirmed the position of the needle within the caudal space. Omnipaque 240, 3 cc was injected to confirm an appropriate epidural spread. A total of 9ml of preservative-free sterile saline, 1 ml of Methylprednisolone (80 mg/cc) was injected via the needle into the caudal space. The needle was cleared with three ml preservative-free normal saline. There was no evidence of CSF or heme. The needle was removed intact. A band aid was place on the site.\par \par Epidurogram Report: A spinal needle is in place in the caudal epidural space. Central epidural spread of dye is noted from the lower sacral segments to L4-5. Dye is seen outlining the sacral nerve roots bilaterally as they emerge from their respective foramen without obstruction pointing away from adhesion/fibrosis\par \par Complications: None. The patient tolerated the procedure well. \par \par Disposition: I have examined the patient and there are no new physical findings since the original presentation. Sensory and motor function were intact. The patient met discharge criteria see nurses notes. The discharge instruction sheet was reviewed and given to the patient. The patient was discharged home with a . If patient gets sustained relief will have patient do modified planks 3 times a day on carpet or yoga mat starting at 5 seconds building up to 1 minute without grimacing/Valsalva and walking. \par Comments: 1st caudal OBED today, depending on effectiveness would schedule a 2nd caudal OBED in 1-2 weeks or follow up in office depending on insurance. Call if any problems. \par \par This document was electronically signed by: \par Parish Skinner D.O. \par Diplomat, American Board of Anesthesiology\par Diplomat, American Board of Pain Medicine\par Diplomat, American Board of Pain Management\par \par

## 2023-03-08 ENCOUNTER — APPOINTMENT (OUTPATIENT)
Dept: PAIN MANAGEMENT | Facility: CLINIC | Age: 63
End: 2023-03-08
Payer: MEDICAID

## 2023-03-08 VITALS — HEIGHT: 61 IN | WEIGHT: 124 LBS | BODY MASS INDEX: 23.41 KG/M2

## 2023-03-08 DIAGNOSIS — M54.16 RADICULOPATHY, LUMBAR REGION: ICD-10-CM

## 2023-03-08 DIAGNOSIS — M54.50 LOW BACK PAIN, UNSPECIFIED: ICD-10-CM

## 2023-03-08 DIAGNOSIS — M48.061 SPINAL STENOSIS, LUMBAR REGION WITHOUT NEUROGENIC CLAUDICATION: ICD-10-CM

## 2023-03-08 PROCEDURE — 99214 OFFICE O/P EST MOD 30 MIN: CPT

## 2023-03-08 NOTE — PHYSICAL EXAM
[de-identified] : GENERAL APPEARANCE OF PATIENT IS WELL DEVELOPED, WELL NOURISHED, BODY HABITUS NORMAL, WELL GROOMED, NO DEFORMITIES NOTED. \par Head - Atraumatic and Normocephalic \par Eyes, Nose, and Throat: External inspection of ears and nose are normal overall without scars, lesions, or masses noted. Assessment of hearing is normal\par Neck-Examination of neck shows no masses, overall appearance is normal, neck is symmetric, tracheal position is midline, no crepitus is noted. Examination of thyroid shows no enlargement, tenderness or masses\par Respiratory- Assessment of respiratory effort shows no intercostal retractions, no use of accessory muscles, unlabored breathing, and normal diaphragmatic movement.\par Cardiovascular- Examination of extremities show no edema or varicosities\par Musculoskeletal. Examination of gait is not antalgic and station is normal\par Inspection and palpation of digits and nails shows no clubbing, cyanosis, nodules, drainage, fluctuance, petechiae\par \par • Spine – good ROM in flexion. Pain on extension. \par \par \par • Neck- inspection and palpation shows no misalignment, asymmetry, crepitation, defects, tenderness, masses, effusions. ROM is normal without crepitation or contracture. No instability or subluxation or laxity is noted. No abnormal movements.\par \par \par • RUE- inspection and palpation shows no misalignment, asymmetry, crepitation, defects, tenderness, masses, effusions. ROM is normal without crepitation or contracture. No instability or subluxation or laxity is noted. No abnormal movements.\par \par \par • LUE- inspection and palpation shows no misalignment, asymmetry, crepitation, defects, tenderness, masses, effusions. ROM is normal without crepitation or contracture. No instability or subluxation or laxity is noted. No abnormal movements.\par \par \par • RLE- inspection and palpation shows no misalignment, asymmetry, crepitation, defects, tenderness, masses, effusions. ROM is normal without crepitation or contracture. No instability or subluxation or laxity is noted. No abnormal movements.\par \par \par • LLE- inspection and palpation shows no misalignment, asymmetry, crepitation, defects, tenderness, masses, effusions. ROM is normal without crepitation or contracture. No instability or subluxation or laxity is noted. No abnormal movements.\par \par \par • Skin- Inspection of skin and subcutaneous tissue shows no rashes, lesions or ulcers. Palpation of skin and subcutaneous tissue shows no rashes, no indurations, subcutaneous nodules or tightening.\par \par \par • Abdomen- Nontender\par \par \par • Neurologic- CN 2-12 are grossly intact. No sensory or motor deficits in the upper and lower extremities. Adequate strength in upper and lower extremities \par \par \par • Psychiatric- Patient’s judgment and insight are intact. Oriented to time, place and person. Recent and remote memory intact.\par

## 2023-03-08 NOTE — ASSESSMENT
[FreeTextEntry1] : Ms. Kellen Rios is a 62 year old woman complaining of lower back pain. The patient has had this pain for 3 years. The pain started after no specific injury or event. Patient describes pain as moderate to severe. Patient is S/P Caudal OBED on 2/15/23 which provided her with excellent relief. We will schedule a second Caudal OBED w/ MAC for additional relief. Patient will f/u in 4 weeks for further evaluation.\par \par Patient had a MRI that shows a radicular component along with pain referred into the lower extremity. Patient has trialed rehab (Home exercise, physical therapy or chiropractic care) and medications. I will schedule a caudal epidural steroid injection.\par \par The patient has severe anxiety of procedures that necessitates monitored anesthesia care (MAC). The procedure performed will be close to major nerves, arteries, and spinal cord and/or joint structures. Due to the proximity of these structures, we need the patient to be still during the procedure.  With the help of MAC, this will be safely achieved and decrease the risk of any complications.\par \par Nathan Smith PA-C\par Parish Skinner D.O\par

## 2023-03-08 NOTE — HISTORY OF PRESENT ILLNESS
[FreeTextEntry1] : HISTORY OF PRESENT ILLNESS: This is a 62 year old woman complaining of lower back pain. The patient has had this pain for 3 years. The pain started after no specific injury or event. Patient describes pain as moderate to severe. During the last month the pain has been nearly constant with symptoms worsening in no typical pattern. Pain described as sharp pressure-like.  Pain is not associated with numbness or weakness.  Pain is increased with standing, sitting and walking.  Pain is decreased with lying down and relaxation. Pain is not changed with coughing/sneezing and bowel movements. Bowel or bladder habits have not changed.\par  \par ACTIVITIES: Patient could walk 5 blocks before the pain starts. Patient can sit 30 minutes  before pain starts. Patient can stand 10 minutes before pain starts. The patient often lays down because of pain. Patient uses no assistive walking at this time. Patient has difficulty performing household chores and participating in recreational activities at this time.\par \par PRIOR PAIN TREATMENTS:  No relief with nerve block/injection and physical therapy\par \par PRIOR PAIN MEDICATIONS:  No prior pain medication\par \par PRESENTING TODAY: In revisit encounter in regard to her continued lower back pain. Patient is S/P Caudal OBED on 2/15/23 which provided her with excellent relief. She feels that over 50% of the pain has subsided. Pt. was accompanied by her daughter who was translating and explained that her mom is able to preform her ADLs more comfortably and would love to continue with injection therapy. We will discuss further treatments today.

## 2023-03-15 ENCOUNTER — LABORATORY RESULT (OUTPATIENT)
Age: 63
End: 2023-03-15

## 2023-03-15 ENCOUNTER — OUTPATIENT (OUTPATIENT)
Dept: OUTPATIENT SERVICES | Facility: HOSPITAL | Age: 63
LOS: 1 days | Discharge: HOME | End: 2023-03-15
Payer: MEDICAID

## 2023-03-15 ENCOUNTER — APPOINTMENT (OUTPATIENT)
Dept: HEMATOLOGY ONCOLOGY | Facility: CLINIC | Age: 63
End: 2023-03-15

## 2023-03-15 DIAGNOSIS — Z98.890 OTHER SPECIFIED POSTPROCEDURAL STATES: Chronic | ICD-10-CM

## 2023-03-15 DIAGNOSIS — C18.4 MALIGNANT NEOPLASM OF TRANSVERSE COLON: ICD-10-CM

## 2023-03-15 LAB
ALBUMIN SERPL ELPH-MCNC: 4.4 G/DL
ALP BLD-CCNC: 82 U/L
ALT SERPL-CCNC: 25 U/L
ANION GAP SERPL CALC-SCNC: 11 MMOL/L
AST SERPL-CCNC: 20 U/L
BILIRUB DIRECT SERPL-MCNC: <0.2 MG/DL
BILIRUB INDIRECT SERPL-MCNC: >0.5 MG/DL
BILIRUB SERPL-MCNC: 0.7 MG/DL
BUN SERPL-MCNC: 18 MG/DL
CALCIUM SERPL-MCNC: 9.2 MG/DL
CHLORIDE SERPL-SCNC: 105 MMOL/L
CO2 SERPL-SCNC: 26 MMOL/L
CREAT SERPL-MCNC: 0.7 MG/DL
EGFR: 97 ML/MIN/1.73M2
GLUCOSE SERPL-MCNC: 111 MG/DL
HCT VFR BLD CALC: 40.7 %
HGB BLD-MCNC: 13.4 G/DL
MCHC RBC-ENTMCNC: 30 PG
MCHC RBC-ENTMCNC: 32.9 G/DL
MCV RBC AUTO: 91.3 FL
PLATELET # BLD AUTO: 215 K/UL
PMV BLD: 11 FL
POTASSIUM SERPL-SCNC: 4.4 MMOL/L
PROT SERPL-MCNC: 7.3 G/DL
RBC # BLD: 4.46 M/UL
RBC # FLD: 13.4 %
SODIUM SERPL-SCNC: 142 MMOL/L
WBC # FLD AUTO: 5.82 K/UL

## 2023-03-15 PROCEDURE — 36415 COLL VENOUS BLD VENIPUNCTURE: CPT

## 2023-03-15 PROCEDURE — 82378 CARCINOEMBRYONIC ANTIGEN: CPT

## 2023-03-15 PROCEDURE — 80076 HEPATIC FUNCTION PANEL: CPT

## 2023-03-15 PROCEDURE — 85027 COMPLETE CBC AUTOMATED: CPT

## 2023-03-15 PROCEDURE — 80048 BASIC METABOLIC PNL TOTAL CA: CPT

## 2023-03-16 DIAGNOSIS — C18.4 MALIGNANT NEOPLASM OF TRANSVERSE COLON: ICD-10-CM

## 2023-03-16 LAB — CEA SERPL-MCNC: 3.2 NG/ML

## 2023-03-17 ENCOUNTER — OUTPATIENT (OUTPATIENT)
Dept: OUTPATIENT SERVICES | Facility: HOSPITAL | Age: 63
LOS: 1 days | Discharge: HOME | End: 2023-03-17
Payer: MEDICAID

## 2023-03-17 ENCOUNTER — APPOINTMENT (OUTPATIENT)
Dept: HEMATOLOGY ONCOLOGY | Facility: CLINIC | Age: 63
End: 2023-03-17
Payer: MEDICAID

## 2023-03-17 ENCOUNTER — APPOINTMENT (OUTPATIENT)
Dept: HEMATOLOGY ONCOLOGY | Facility: CLINIC | Age: 63
End: 2023-03-17

## 2023-03-17 VITALS
WEIGHT: 125 LBS | HEIGHT: 62 IN | HEART RATE: 81 BPM | DIASTOLIC BLOOD PRESSURE: 72 MMHG | TEMPERATURE: 98 F | SYSTOLIC BLOOD PRESSURE: 121 MMHG | BODY MASS INDEX: 23 KG/M2 | RESPIRATION RATE: 16 BRPM

## 2023-03-17 DIAGNOSIS — C18.4 MALIGNANT NEOPLASM OF TRANSVERSE COLON: ICD-10-CM

## 2023-03-17 DIAGNOSIS — Z98.890 OTHER SPECIFIED POSTPROCEDURAL STATES: Chronic | ICD-10-CM

## 2023-03-17 PROCEDURE — 99214 OFFICE O/P EST MOD 30 MIN: CPT

## 2023-03-17 NOTE — ASSESSMENT
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 (Stage III) left sided colon moderately differentiated adenocarcinoma ; s/p robotic splenic flexure resection on 12/16/2021; ECOG 0-1\par - Discussed adjuvant chemo: offered FOLFOX vs XELOX but she chose XELOX for 3 months\par - s/p initial cycle of XelOx, started on 1.25.2022\par - s/p Capecitabine 1500mg in AM + 1000mg in PM daily for 2 weeks on, 1 week break every 21 days *LAST CYCLE*\par - s/p cycle 4 of Oxaliplatin IV every 3 weeks on 3/29 *LAST CYCLE*\par - CT CAP from 6/14/22 JIN, CEA wnl\par - CT C/A/P with IVC 12.30.2022 shows nonspecific 2.3 cm nodule in the anterior pelvic mesentery\par - s/p colonoscopy at 1 year post surgery in 11/2022 with Dr. Rhoades ; reportedly normal.  Due again in 2025.  \par - CT C/A/P ordered to be done in 05/2023 , Rx given \par \par # Anemia, microcytic; at least partially due to iron deficiency\par - s/p Venofer 200mg IV weekly x 5 doses , completed on 2.25.2022\par - repeat CBC shows hgb stable at 13.4g/dL \par \par RTC in 3 months with CBC, BMP, LFTs, CEA, iron studies prior \par \par seen/examinedw NP C;.Smart\par note reviewed; case discussed\par colooscopy as per GI\par CT CAP every 6 months \par CEA

## 2023-03-17 NOTE — HISTORY OF PRESENT ILLNESS
[de-identified] : Ms. WILFREDO SARKAR is a 61 year old female here today for evaluation and management of Colon Cancer.\par \par She was referred by GI, Dr. Becerra.  WILFREDO is a 61 year old F with PMHx including DM, who presents to clinic to establish care.   She originally presented to ER with chief complaint of abdominal pain x3 days, described as sharp in nature diffusely with no radiation or noted aggravating/alleviating factors.  Patient reported that she had not passed\par her bowels for 3 days prior and began to develop multiple episodes of NBNB emesis.  She is presently feeling well with no new complaints.  Patient denies fever, chills, nausea, vomiting, dyspnea, unintentional weight loss or bleeding.  Patient reports no pertinent personal or family history of malignancy or blood/clotting disorder.\par \par \par RADIOLOGIC WORKUP\par CT A/P (12.2.2021) IMPRESSION:Distal small bowel and and proximal large bowel obstruction to the level of the splenic flexure where there is a colonic mural nodule measuring 1.2 cm, concerning for primary neoplasm. Direct visualization with colonoscopy is suggested.Numerous bilobar hepatic hypodensities measuring simple fluid and likely reflecting cysts. However, in view of the above findings, metastatic disease cannot be excluded. Further evaluation with MRI with and without contrast may be of benefit.\par \par MRI ABDOMEN 11.16.21:  no evience of liver mets; hepatometaly with hepatic cystic dsease\par \par CT CHEST 10/26/21 5MM RLL NODULE\par \par LAB WORKUP\par (12.3.2021) WBC 5.94, Hgb 6.6, MCV 73.6, RDW 17.2, , Cr 0.5, eGFR 103, Calcium 7.5\par \par PATHOLOGY\par (see results section)\par \par HCM\par Colonoscopy done 12/3/2021 with Dr. Chance showed mass in splenic flexure [FreeTextEntry1] : Started Capecitabine + Oxaliplatin on 1.25.2022 - 03.29.2022 [de-identified] : 2/15/22\par Patient is here for a follow-up visit for colon cancer, accompanied by daughter, Shruthi.  She is feeling well with no new complaints.  She is s/p initial cycle of XelOx, started on 1.25.2022.  Reviewed most recent CBC, which is stable.  Patient denies fever, chills, dyspnea, skin rash or bleeding.  She did acknowledge slight change in sensation in hands when exposed to cold.  She developed transient nausea and an episode of vomiting the day following last cycle of chemotherapy but did not have anti-emetics yet; they have anti-emetics stocked at home now.  \par \par 3/29/22\par Patient is here for a follow-up visit for colon cancer, accompanied by daughter, Shruthi.  She is feeling well with no new complaints.  She is due for cycle 4 of XelOx, starting today 3/29.  Reviewed most recent CBC, which is stable.  CEA is 3.7ng/mL from 03/2022.  She does acknowledge slight change in sensation in hands when exposed to cold.  She also developed lateral R sided trunk/rib pain over the last few days, no worse with applied pressure and no clear triggers ; however, she was exercising recently and may have strained reaching upward.  Patient denies fever, chills, dyspnea, skin rash, vomiting or bleeding.  \par \par 4/20/22\par Pt is here for follow up. She is accompanied by daughter, Shruthi. She is feeling well with no new complaints. Denies numbness in her fingers. However, pt complains of lower back pain which is not new and presence prior to chemo. Otherwise, pt denies fever, chills, dyspnea, skin rash, vomiting or bleeding.  \par \par 6/21/22\par Pt is here for followup\par CT CAP was negative for recurrence\par CEA is wnl\par No complaints today\par She is scheduled to see her GI October 2022\par \par 10/7/22\par Patient is here for a follow-up visit for colon cancer, accompanied by daughter, Shruthi.  She is feeling well with no new complaints.  She is s/p 4 cycles of XelOx, completed in 03/2022.  Reviewed most recent CBC, which is stable.  CEA is stable and WNL from 09/2022.  Patient denies fever, chills, dyspnea, skin rash, vomiting, abdominal pain or bleeding.  She endorses chronic back pain, which is controlled by pain patch.  She still notes slight change in sensation in hands.  \par \par 3/17/23\par Patient is here for a follow-up visit for colon cancer, accompanied by daughter, Shruthi.  She is feeling well with no new complaints.  Reviewed most recent CBC, which is stable without evidence of anemia.  CEA is stable and WNL from 03/2023.  Patient denies fever, chills, dyspnea, skin rash, vomiting, abdominal pain or bleeding.  Weight remains essentially stable.  She is s/p colonoscopy with Dr. 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  Reviewed most recent CT imaging which shows nonspecific 2.3 cm nodule in the anterior pelvic mesentery.  \par CT C/A/P (1.3.2023) IMPRESSION:Nonspecific 2.3 cm nodule in the anterior pelvic mesentery. Findings of unclear etiology as patient has history of malignancy which raises concern for metastatic disease, however finding also demonstrate greater than one-year stability/no growth. Clinical correlation needed to determine if needed for workup versus surveillance.Otherwise no definite CT evidence for metastatic disease to the chest, abdomen or pelvis.

## 2023-03-17 NOTE — REVIEW OF SYSTEMS
[Negative] : Allergic/Immunologic [Fever] : no fever [Eye Pain] : no eye pain [Dysphagia] : no dysphagia [Chest Pain] : no chest pain [Shortness Of Breath] : no shortness of breath [Abdominal Pain] : no abdominal pain [Vomiting] : no vomiting [Diarrhea] : no diarrhea [Dysuria] : no dysuria [Joint Pain] : no joint pain [Confused] : no confusion [Suicidal] : not suicidal [Proptosis] : no proptosis [FreeTextEntry7] :  lateral R sided chronic back pain , follows with [facility]  [de-identified] : slight change in sensation in hands following chemotherapy , gradually improving

## 2023-03-21 DIAGNOSIS — D50.9 IRON DEFICIENCY ANEMIA, UNSPECIFIED: ICD-10-CM

## 2023-03-22 ENCOUNTER — APPOINTMENT (OUTPATIENT)
Dept: PAIN MANAGEMENT | Facility: CLINIC | Age: 63
End: 2023-03-22

## 2023-04-14 ENCOUNTER — APPOINTMENT (OUTPATIENT)
Dept: PAIN MANAGEMENT | Facility: CLINIC | Age: 63
End: 2023-04-14

## 2023-05-12 ENCOUNTER — OUTPATIENT (OUTPATIENT)
Dept: OUTPATIENT SERVICES | Facility: HOSPITAL | Age: 63
LOS: 1 days | End: 2023-05-12
Payer: MEDICARE

## 2023-05-12 ENCOUNTER — APPOINTMENT (OUTPATIENT)
Dept: HEMATOLOGY ONCOLOGY | Facility: CLINIC | Age: 63
End: 2023-05-12

## 2023-05-12 ENCOUNTER — RESULT REVIEW (OUTPATIENT)
Age: 63
End: 2023-05-12

## 2023-05-12 DIAGNOSIS — Z00.8 ENCOUNTER FOR OTHER GENERAL EXAMINATION: ICD-10-CM

## 2023-05-12 DIAGNOSIS — C18.4 MALIGNANT NEOPLASM OF TRANSVERSE COLON: ICD-10-CM

## 2023-05-12 DIAGNOSIS — R06.2 WHEEZING: ICD-10-CM

## 2023-05-12 DIAGNOSIS — Z98.890 OTHER SPECIFIED POSTPROCEDURAL STATES: Chronic | ICD-10-CM

## 2023-05-12 PROCEDURE — 74177 CT ABD & PELVIS W/CONTRAST: CPT

## 2023-05-12 PROCEDURE — 71260 CT THORAX DX C+: CPT

## 2023-05-12 PROCEDURE — 74177 CT ABD & PELVIS W/CONTRAST: CPT | Mod: 26,MH

## 2023-05-12 PROCEDURE — 71260 CT THORAX DX C+: CPT | Mod: 26,MH

## 2023-05-13 DIAGNOSIS — R06.2 WHEEZING: ICD-10-CM

## 2023-05-13 DIAGNOSIS — C18.4 MALIGNANT NEOPLASM OF TRANSVERSE COLON: ICD-10-CM

## 2023-06-23 ENCOUNTER — APPOINTMENT (OUTPATIENT)
Dept: HEMATOLOGY ONCOLOGY | Facility: CLINIC | Age: 63
End: 2023-06-23

## 2023-06-23 ENCOUNTER — LABORATORY RESULT (OUTPATIENT)
Age: 63
End: 2023-06-23

## 2023-06-23 ENCOUNTER — OUTPATIENT (OUTPATIENT)
Dept: OUTPATIENT SERVICES | Facility: HOSPITAL | Age: 63
LOS: 1 days | End: 2023-06-23
Payer: MEDICARE

## 2023-06-23 DIAGNOSIS — C18.4 MALIGNANT NEOPLASM OF TRANSVERSE COLON: ICD-10-CM

## 2023-06-23 DIAGNOSIS — Z98.890 OTHER SPECIFIED POSTPROCEDURAL STATES: Chronic | ICD-10-CM

## 2023-06-23 DIAGNOSIS — D50.9 IRON DEFICIENCY ANEMIA, UNSPECIFIED: ICD-10-CM

## 2023-06-23 PROCEDURE — 36415 COLL VENOUS BLD VENIPUNCTURE: CPT

## 2023-06-23 PROCEDURE — 80076 HEPATIC FUNCTION PANEL: CPT

## 2023-06-23 PROCEDURE — 85027 COMPLETE CBC AUTOMATED: CPT

## 2023-06-23 PROCEDURE — 80048 BASIC METABOLIC PNL TOTAL CA: CPT

## 2023-06-23 PROCEDURE — 83540 ASSAY OF IRON: CPT

## 2023-06-23 PROCEDURE — 82728 ASSAY OF FERRITIN: CPT

## 2023-06-23 PROCEDURE — 82378 CARCINOEMBRYONIC ANTIGEN: CPT

## 2023-06-23 PROCEDURE — 83550 IRON BINDING TEST: CPT

## 2023-07-03 LAB
ALBUMIN SERPL ELPH-MCNC: 4.4 G/DL
ALP BLD-CCNC: 97 U/L
ALT SERPL-CCNC: 24 U/L
ANION GAP SERPL CALC-SCNC: 12 MMOL/L
AST SERPL-CCNC: 21 U/L
BILIRUB DIRECT SERPL-MCNC: <0.2 MG/DL
BILIRUB INDIRECT SERPL-MCNC: >0.4 MG/DL
BILIRUB SERPL-MCNC: 0.6 MG/DL
BUN SERPL-MCNC: 15 MG/DL
CALCIUM SERPL-MCNC: 9.4 MG/DL
CEA SERPL-MCNC: 2.6 NG/ML
CHLORIDE SERPL-SCNC: 103 MMOL/L
CO2 SERPL-SCNC: 25 MMOL/L
CREAT SERPL-MCNC: 0.7 MG/DL
EGFR: 96 ML/MIN/1.73M2
FERRITIN SERPL-MCNC: 178 NG/ML
GLUCOSE SERPL-MCNC: 129 MG/DL
HCT VFR BLD CALC: 41.9 %
HGB BLD-MCNC: 13.8 G/DL
IRON SATN MFR SERPL: 30 %
IRON SERPL-MCNC: 91 UG/DL
MCHC RBC-ENTMCNC: 30.1 PG
MCHC RBC-ENTMCNC: 32.9 G/DL
MCV RBC AUTO: 91.3 FL
PLATELET # BLD AUTO: 224 K/UL
PMV BLD: 11.4 FL
POTASSIUM SERPL-SCNC: 4.5 MMOL/L
PROT SERPL-MCNC: 7.1 G/DL
RBC # BLD: 4.59 M/UL
RBC # FLD: 12.5 %
SODIUM SERPL-SCNC: 140 MMOL/L
TIBC SERPL-MCNC: 307 UG/DL
UIBC SERPL-MCNC: 216 UG/DL
WBC # FLD AUTO: 6.15 K/UL

## 2023-07-07 ENCOUNTER — OUTPATIENT (OUTPATIENT)
Dept: OUTPATIENT SERVICES | Facility: HOSPITAL | Age: 63
LOS: 1 days | End: 2023-07-07
Payer: MEDICARE

## 2023-07-07 ENCOUNTER — APPOINTMENT (OUTPATIENT)
Dept: HEMATOLOGY ONCOLOGY | Facility: CLINIC | Age: 63
End: 2023-07-07
Payer: MEDICARE

## 2023-07-07 VITALS
HEIGHT: 62 IN | TEMPERATURE: 97.5 F | BODY MASS INDEX: 23.37 KG/M2 | WEIGHT: 127 LBS | DIASTOLIC BLOOD PRESSURE: 64 MMHG | RESPIRATION RATE: 16 BRPM | SYSTOLIC BLOOD PRESSURE: 124 MMHG | HEART RATE: 89 BPM

## 2023-07-07 DIAGNOSIS — C18.4 MALIGNANT NEOPLASM OF TRANSVERSE COLON: ICD-10-CM

## 2023-07-07 DIAGNOSIS — Z98.890 OTHER SPECIFIED POSTPROCEDURAL STATES: Chronic | ICD-10-CM

## 2023-07-07 DIAGNOSIS — D50.9 IRON DEFICIENCY ANEMIA, UNSPECIFIED: ICD-10-CM

## 2023-07-07 PROCEDURE — 99214 OFFICE O/P EST MOD 30 MIN: CPT

## 2023-07-07 NOTE — ASSESSMENT
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 (Stage III) left sided colon moderately differentiated adenocarcinoma ; s/p robotic splenic flexure resection on 12/16/2021; ECOG 0-1\par - Discussed adjuvant chemo: offered FOLFOX vs XELOX but she chose XELOX for 3 months\par - s/p Oxaliplatin + Capecitabine 1500mg in AM + 1000mg in PM daily for 2 weeks on, 1 week break every 21 days from 1.25.2022 - 03.29.2022\par - s/p colonoscopy at 1 year post surgery in 11/2022 with Dr. Rhoades ; reportedly normal.  Due again in 2025 but recommend to be done within the next year or so for closer surveillance.   \par - CT C/A/P 5.12.2023 shows stable nonspecific 2.3 cm nodule in the anterior pelvic mesentery \par - will continue with close surveillance for now \par - plan to repeat CT imaging at 6 month ba around 11/2023 , will order at next visit \par \par # Anemia, microcytic; at least partially due to iron deficiency \par - s/p Venofer 200mg IV weekly x 5 doses , completed on 2.25.2022 \par - repeat CBC shows hgb stable at 13.8g/dL \par - no additional IV iron needed at this time \par \par RTC in 3 months with CBC, BMP, LFTs, CEA, iron studies, ferritin level prior \par \par SEEN/examined w/ NP Jody; note reviewed; case discussed; agree w/ the plan\par - colonoscopy within the next 3 years\par - CT CAP IVC every 6 months\par - CEA

## 2023-07-07 NOTE — REVIEW OF SYSTEMS
[Negative] : Allergic/Immunologic [Fever] : no fever [Eye Pain] : no eye pain [Dysphagia] : no dysphagia [Chest Pain] : no chest pain [Shortness Of Breath] : no shortness of breath [Abdominal Pain] : no abdominal pain [Vomiting] : no vomiting [Dysuria] : no dysuria [Joint Pain] : no joint pain [Confused] : no confusion [Suicidal] : not suicidal [Proptosis] : no proptosis [FreeTextEntry7] :  lateral R sided chronic back pain , follows with [facility]  [de-identified] : slight change in sensation in hands following chemotherapy , gradually improving

## 2023-07-07 NOTE — HISTORY OF PRESENT ILLNESS
[de-identified] : Ms. WILFREDO SARKAR is a 61 year old female here today for evaluation and management of Colon Cancer.\par \par She was referred by GI, Dr. Becerra.  WILFREDO is a 61 year old F with PMHx including DM, who presents to clinic to establish care.   She originally presented to ER with chief complaint of abdominal pain x3 days, described as sharp in nature diffusely with no radiation or noted aggravating/alleviating factors.  Patient reported that she had not passed\par her bowels for 3 days prior and began to develop multiple episodes of NBNB emesis.  She is presently feeling well with no new complaints.  Patient denies fever, chills, nausea, vomiting, dyspnea, unintentional weight loss or bleeding.  Patient reports no pertinent personal or family history of malignancy or blood/clotting disorder.\par \par \par RADIOLOGIC WORKUP\par CT A/P (12.2.2021) IMPRESSION:Distal small bowel and and proximal large bowel obstruction to the level of the splenic flexure where there is a colonic mural nodule measuring 1.2 cm, concerning for primary neoplasm. Direct visualization with colonoscopy is suggested.Numerous bilobar hepatic hypodensities measuring simple fluid and likely reflecting cysts. However, in view of the above findings, metastatic disease cannot be excluded. Further evaluation with MRI with and without contrast may be of benefit.\par \par MRI ABDOMEN 11.16.21:  no evience of liver mets; hepatometaly with hepatic cystic dsease\par \par CT CHEST 10/26/21 5MM RLL NODULE\par \par LAB WORKUP\par (12.3.2021) WBC 5.94, Hgb 6.6, MCV 73.6, RDW 17.2, , Cr 0.5, eGFR 103, Calcium 7.5\par \par PATHOLOGY\par (see results section)\par \par HCM\par Colonoscopy done 12/3/2021 with Dr. Chance showed mass in splenic flexure [FreeTextEntry1] : Started Capecitabine + Oxaliplatin on 1.25.2022 - 03.29.2022 [de-identified] : 2/15/22\par Patient is here for a follow-up visit for colon cancer, accompanied by daughter, Shruthi.  She is feeling well with no new complaints.  She is s/p initial cycle of XelOx, started on 1.25.2022.  Reviewed most recent CBC, which is stable.  Patient denies fever, chills, dyspnea, skin rash or bleeding.  She did acknowledge slight change in sensation in hands when exposed to cold.  She developed transient nausea and an episode of vomiting the day following last cycle of chemotherapy but did not have anti-emetics yet; they have anti-emetics stocked at home now.  \par \par 3/29/22\par Patient is here for a follow-up visit for colon cancer, accompanied by daughter, Shruthi.  She is feeling well with no new complaints.  She is due for cycle 4 of XelOx, starting today 3/29.  Reviewed most recent CBC, which is stable.  CEA is 3.7ng/mL from 03/2022.  She does acknowledge slight change in sensation in hands when exposed to cold.  She also developed lateral R sided trunk/rib pain over the last few days, no worse with applied pressure and no clear triggers ; however, she was exercising recently and may have strained reaching upward.  Patient denies fever, chills, dyspnea, skin rash, vomiting or bleeding.  \par \par 4/20/22\par Pt is here for follow up. She is accompanied by daughter, Shruthi. She is feeling well with no new complaints. Denies numbness in her fingers. However, pt complains of lower back pain which is not new and presence prior to chemo. Otherwise, pt denies fever, chills, dyspnea, skin rash, vomiting or bleeding.  \par \par 6/21/22\par Pt is here for followup\par CT CAP was negative for recurrence\par CEA is wnl\par No complaints today\par She is scheduled to see her GI October 2022\par \par 10/7/22\par Patient is here for a follow-up visit for colon cancer, accompanied by daughter, Shruthi.  She is feeling well with no new complaints.  She is s/p 4 cycles of XelOx, completed in 03/2022.  Reviewed most recent CBC, which is stable.  CEA is stable and WNL from 09/2022.  Patient denies fever, chills, dyspnea, skin rash, vomiting, abdominal pain or bleeding.  She endorses chronic back pain, which is controlled by pain patch.  She still notes slight change in sensation in hands.  \par \par 3/17/23\par Patient is here for a follow-up visit for colon cancer, accompanied by daughter, Shruthi.  She is feeling well with no new complaints.  Reviewed most recent CBC, which is stable without evidence of anemia.  CEA is stable and WNL from 03/2023.  Patient denies fever, chills, dyspnea, skin rash, vomiting, abdominal pain or bleeding.  Weight remains essentially stable.  She is s/p colonoscopy with Dr. 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  Reviewed most recent CT imaging which shows nonspecific 2.3 cm nodule in the anterior pelvic mesentery.  \par CT C/A/P (1.3.2023) IMPRESSION:Nonspecific 2.3 cm nodule in the anterior pelvic mesentery. Findings of unclear etiology as patient has history of malignancy which raises concern for metastatic disease, however finding also demonstrate greater than one-year stability/no growth. Clinical correlation needed to determine if needed for workup versus surveillance.Otherwise no definite CT evidence for metastatic disease to the chest, abdomen or pelvis.\par \par 7/7/23\par Patient is here for a follow-up visit for colon cancer, accompanied by daughter, Shruthi.  She is feeling well with no new complaints.  Reviewed most recent CBC, which is stable without evidence of anemia.  CEA is stable and WNL from 06/2023.  Patient denies fever, chills, dyspnea, skin rash, vomiting, abdominal pain or bleeding.  Weight remains essentially stable.  Reviewed most recent CT imaging which shows stable nonspecific 2.3 cm nodule in the anterior pelvic mesentery.  \par CT C/A/P (5.12.2023) IMPRESSION:2.3 cm nodule in the mid pelvic mesentery which demonstrates 2 years of stability/no growth. Given history of malignancy, clinical correlation is needed needed to determine the need for workup versus surveillance.Otherwise no definite CT evidence for metastatic disease to the chest, abdomen or pelvis.

## 2023-10-02 ENCOUNTER — LABORATORY RESULT (OUTPATIENT)
Age: 63
End: 2023-10-02

## 2023-10-02 ENCOUNTER — APPOINTMENT (OUTPATIENT)
Dept: HEMATOLOGY ONCOLOGY | Facility: CLINIC | Age: 63
End: 2023-10-02

## 2023-10-02 ENCOUNTER — OUTPATIENT (OUTPATIENT)
Dept: OUTPATIENT SERVICES | Facility: HOSPITAL | Age: 63
LOS: 1 days | End: 2023-10-02
Payer: MEDICARE

## 2023-10-02 DIAGNOSIS — C18.4 MALIGNANT NEOPLASM OF TRANSVERSE COLON: ICD-10-CM

## 2023-10-02 DIAGNOSIS — D50.9 IRON DEFICIENCY ANEMIA, UNSPECIFIED: ICD-10-CM

## 2023-10-02 DIAGNOSIS — Z98.890 OTHER SPECIFIED POSTPROCEDURAL STATES: Chronic | ICD-10-CM

## 2023-10-02 LAB
HCT VFR BLD CALC: 41.3 %
HGB BLD-MCNC: 13.5 G/DL
MCHC RBC-ENTMCNC: 29.9 PG
MCHC RBC-ENTMCNC: 32.7 G/DL
MCV RBC AUTO: 91.6 FL
PLATELET # BLD AUTO: 279 K/UL
PMV BLD: 10.8 FL
RBC # BLD: 4.51 M/UL
RBC # FLD: 12.9 %
WBC # FLD AUTO: 8.17 K/UL

## 2023-10-02 PROCEDURE — 80076 HEPATIC FUNCTION PANEL: CPT

## 2023-10-02 PROCEDURE — 36415 COLL VENOUS BLD VENIPUNCTURE: CPT

## 2023-10-02 PROCEDURE — 83540 ASSAY OF IRON: CPT

## 2023-10-02 PROCEDURE — 82728 ASSAY OF FERRITIN: CPT

## 2023-10-02 PROCEDURE — 82378 CARCINOEMBRYONIC ANTIGEN: CPT

## 2023-10-02 PROCEDURE — 85027 COMPLETE CBC AUTOMATED: CPT

## 2023-10-02 PROCEDURE — 83550 IRON BINDING TEST: CPT

## 2023-10-02 PROCEDURE — 80048 BASIC METABOLIC PNL TOTAL CA: CPT

## 2023-10-03 LAB
ALBUMIN SERPL ELPH-MCNC: 4.6 G/DL
ALP BLD-CCNC: 91 U/L
ALT SERPL-CCNC: 20 U/L
ANION GAP SERPL CALC-SCNC: 13 MMOL/L
AST SERPL-CCNC: 19 U/L
BILIRUB DIRECT SERPL-MCNC: <0.2 MG/DL
BILIRUB INDIRECT SERPL-MCNC: >0.3 MG/DL
BILIRUB SERPL-MCNC: 0.5 MG/DL
BUN SERPL-MCNC: 15 MG/DL
CALCIUM SERPL-MCNC: 9.7 MG/DL
CEA SERPL-MCNC: 2.4 NG/ML
CHLORIDE SERPL-SCNC: 105 MMOL/L
CO2 SERPL-SCNC: 23 MMOL/L
CREAT SERPL-MCNC: 0.6 MG/DL
EGFR: 100 ML/MIN/1.73M2
FERRITIN SERPL-MCNC: 263 NG/ML
GLUCOSE SERPL-MCNC: 80 MG/DL
IRON SATN MFR SERPL: 25 %
IRON SERPL-MCNC: 75 UG/DL
POTASSIUM SERPL-SCNC: 4.5 MMOL/L
PROT SERPL-MCNC: 7.6 G/DL
SODIUM SERPL-SCNC: 141 MMOL/L
TIBC SERPL-MCNC: 298 UG/DL
UIBC SERPL-MCNC: 223 UG/DL

## 2023-10-04 ENCOUNTER — OUTPATIENT (OUTPATIENT)
Dept: OUTPATIENT SERVICES | Facility: HOSPITAL | Age: 63
LOS: 1 days | End: 2023-10-04
Payer: MEDICARE

## 2023-10-04 ENCOUNTER — APPOINTMENT (OUTPATIENT)
Dept: HEMATOLOGY ONCOLOGY | Facility: CLINIC | Age: 63
End: 2023-10-04
Payer: MEDICARE

## 2023-10-04 VITALS
DIASTOLIC BLOOD PRESSURE: 63 MMHG | RESPIRATION RATE: 16 BRPM | TEMPERATURE: 98.2 F | BODY MASS INDEX: 23.37 KG/M2 | SYSTOLIC BLOOD PRESSURE: 131 MMHG | HEIGHT: 62 IN | WEIGHT: 127 LBS | HEART RATE: 83 BPM

## 2023-10-04 DIAGNOSIS — Z98.890 OTHER SPECIFIED POSTPROCEDURAL STATES: Chronic | ICD-10-CM

## 2023-10-04 DIAGNOSIS — D50.9 IRON DEFICIENCY ANEMIA, UNSPECIFIED: ICD-10-CM

## 2023-10-04 DIAGNOSIS — C18.4 MALIGNANT NEOPLASM OF TRANSVERSE COLON: ICD-10-CM

## 2023-10-04 PROCEDURE — 99214 OFFICE O/P EST MOD 30 MIN: CPT

## 2023-12-27 ENCOUNTER — APPOINTMENT (OUTPATIENT)
Dept: HEMATOLOGY ONCOLOGY | Facility: CLINIC | Age: 63
End: 2023-12-27

## 2023-12-27 ENCOUNTER — LABORATORY RESULT (OUTPATIENT)
Age: 63
End: 2023-12-27

## 2023-12-27 ENCOUNTER — OUTPATIENT (OUTPATIENT)
Dept: OUTPATIENT SERVICES | Facility: HOSPITAL | Age: 63
LOS: 1 days | End: 2023-12-27
Payer: MEDICARE

## 2023-12-27 DIAGNOSIS — Z98.890 OTHER SPECIFIED POSTPROCEDURAL STATES: Chronic | ICD-10-CM

## 2023-12-27 DIAGNOSIS — D50.9 IRON DEFICIENCY ANEMIA, UNSPECIFIED: ICD-10-CM

## 2023-12-27 DIAGNOSIS — C18.4 MALIGNANT NEOPLASM OF TRANSVERSE COLON: ICD-10-CM

## 2023-12-27 LAB
ALBUMIN SERPL ELPH-MCNC: 4.4 G/DL
ALP BLD-CCNC: 83 U/L
ALT SERPL-CCNC: 18 U/L
ANION GAP SERPL CALC-SCNC: 10 MMOL/L
AST SERPL-CCNC: 18 U/L
BILIRUB DIRECT SERPL-MCNC: <0.2 MG/DL
BILIRUB INDIRECT SERPL-MCNC: >0.5 MG/DL
BILIRUB SERPL-MCNC: 0.7 MG/DL
BUN SERPL-MCNC: 14 MG/DL
CALCIUM SERPL-MCNC: 9.6 MG/DL
CHLORIDE SERPL-SCNC: 104 MMOL/L
CO2 SERPL-SCNC: 27 MMOL/L
CREAT SERPL-MCNC: 0.7 MG/DL
EGFR: 96 ML/MIN/1.73M2
GLUCOSE SERPL-MCNC: 101 MG/DL
HCT VFR BLD CALC: 41 %
HGB BLD-MCNC: 13.9 G/DL
IRON SATN MFR SERPL: 34 %
IRON SERPL-MCNC: 102 UG/DL
MCHC RBC-ENTMCNC: 30.3 PG
MCHC RBC-ENTMCNC: 33.9 G/DL
MCV RBC AUTO: 89.3 FL
PLATELET # BLD AUTO: 239 K/UL
PMV BLD: 10.4 FL
POTASSIUM SERPL-SCNC: 4.2 MMOL/L
PROT SERPL-MCNC: 7.5 G/DL
RBC # BLD: 4.59 M/UL
RBC # FLD: 13 %
SODIUM SERPL-SCNC: 141 MMOL/L
TIBC SERPL-MCNC: 301 UG/DL
UIBC SERPL-MCNC: 199 UG/DL
WBC # FLD AUTO: 6.98 K/UL

## 2023-12-27 PROCEDURE — 80076 HEPATIC FUNCTION PANEL: CPT

## 2023-12-27 PROCEDURE — 82728 ASSAY OF FERRITIN: CPT

## 2023-12-27 PROCEDURE — 80048 BASIC METABOLIC PNL TOTAL CA: CPT

## 2023-12-27 PROCEDURE — 85027 COMPLETE CBC AUTOMATED: CPT

## 2023-12-27 PROCEDURE — 83550 IRON BINDING TEST: CPT

## 2023-12-27 PROCEDURE — 83540 ASSAY OF IRON: CPT

## 2023-12-27 PROCEDURE — 36415 COLL VENOUS BLD VENIPUNCTURE: CPT

## 2023-12-27 PROCEDURE — 82378 CARCINOEMBRYONIC ANTIGEN: CPT

## 2023-12-28 LAB
CEA SERPL-MCNC: 3.3 NG/ML
FERRITIN SERPL-MCNC: 253 NG/ML

## 2024-01-06 ENCOUNTER — RESULT REVIEW (OUTPATIENT)
Age: 64
End: 2024-01-06

## 2024-01-06 ENCOUNTER — OUTPATIENT (OUTPATIENT)
Dept: OUTPATIENT SERVICES | Facility: HOSPITAL | Age: 64
LOS: 1 days | End: 2024-01-06
Payer: MEDICARE

## 2024-01-06 DIAGNOSIS — Z00.8 ENCOUNTER FOR OTHER GENERAL EXAMINATION: ICD-10-CM

## 2024-01-06 DIAGNOSIS — C18.4 MALIGNANT NEOPLASM OF TRANSVERSE COLON: ICD-10-CM

## 2024-01-06 DIAGNOSIS — Z98.890 OTHER SPECIFIED POSTPROCEDURAL STATES: Chronic | ICD-10-CM

## 2024-01-06 PROCEDURE — 74177 CT ABD & PELVIS W/CONTRAST: CPT

## 2024-01-06 PROCEDURE — 71260 CT THORAX DX C+: CPT

## 2024-01-06 PROCEDURE — 71260 CT THORAX DX C+: CPT | Mod: 26

## 2024-01-06 PROCEDURE — 74177 CT ABD & PELVIS W/CONTRAST: CPT | Mod: 26

## 2024-01-07 DIAGNOSIS — C18.4 MALIGNANT NEOPLASM OF TRANSVERSE COLON: ICD-10-CM

## 2024-01-12 ENCOUNTER — OUTPATIENT (OUTPATIENT)
Dept: OUTPATIENT SERVICES | Facility: HOSPITAL | Age: 64
LOS: 1 days | End: 2024-01-12
Payer: MEDICARE

## 2024-01-12 ENCOUNTER — APPOINTMENT (OUTPATIENT)
Dept: HEMATOLOGY ONCOLOGY | Facility: CLINIC | Age: 64
End: 2024-01-12
Payer: MEDICARE

## 2024-01-12 VITALS
BODY MASS INDEX: 22.26 KG/M2 | DIASTOLIC BLOOD PRESSURE: 67 MMHG | HEART RATE: 73 BPM | WEIGHT: 121 LBS | TEMPERATURE: 97.6 F | HEIGHT: 62 IN | RESPIRATION RATE: 16 BRPM | SYSTOLIC BLOOD PRESSURE: 121 MMHG

## 2024-01-12 DIAGNOSIS — C18.4 MALIGNANT NEOPLASM OF TRANSVERSE COLON: ICD-10-CM

## 2024-01-12 DIAGNOSIS — D50.9 IRON DEFICIENCY ANEMIA, UNSPECIFIED: ICD-10-CM

## 2024-01-12 DIAGNOSIS — Z98.890 OTHER SPECIFIED POSTPROCEDURAL STATES: Chronic | ICD-10-CM

## 2024-01-12 PROCEDURE — 99214 OFFICE O/P EST MOD 30 MIN: CPT

## 2024-01-12 NOTE — REVIEW OF SYSTEMS
[Negative] : Allergic/Immunologic [Fever] : no fever [Eye Pain] : no eye pain [Dysphagia] : no dysphagia [Chest Pain] : no chest pain [Shortness Of Breath] : no shortness of breath [Abdominal Pain] : no abdominal pain [Vomiting] : no vomiting [Dysuria] : no dysuria [Joint Pain] : no joint pain [Confused] : no confusion [Suicidal] : not suicidal [Proptosis] : no proptosis [FreeTextEntry7] :  lateral R sided chronic back pain , follows with [facility]  [de-identified] : slight change in sensation in hands following chemotherapy , gradually improving

## 2024-01-12 NOTE — HISTORY OF PRESENT ILLNESS
[de-identified] : Ms. WILFREDO SARKAR is a 61 year old female here today for evaluation and management of Colon Cancer.\par  \par  She was referred by GI, Dr. Becerra.  WILFREDO is a 61 year old F with PMHx including DM, who presents to clinic to establish care.   She originally presented to ER with chief complaint of abdominal pain x3 days, described as sharp in nature diffusely with no radiation or noted aggravating/alleviating factors.  Patient reported that she had not passed\par  her bowels for 3 days prior and began to develop multiple episodes of NBNB emesis.  She is presently feeling well with no new complaints.  Patient denies fever, chills, nausea, vomiting, dyspnea, unintentional weight loss or bleeding.  Patient reports no pertinent personal or family history of malignancy or blood/clotting disorder.\par  \par  \par  RADIOLOGIC WORKUP\par  CT A/P (12.2.2021) IMPRESSION:Distal small bowel and and proximal large bowel obstruction to the level of the splenic flexure where there is a colonic mural nodule measuring 1.2 cm, concerning for primary neoplasm. Direct visualization with colonoscopy is suggested.Numerous bilobar hepatic hypodensities measuring simple fluid and likely reflecting cysts. However, in view of the above findings, metastatic disease cannot be excluded. Further evaluation with MRI with and without contrast may be of benefit.\par  \par  MRI ABDOMEN 11.16.21:  no evience of liver mets; hepatometaly with hepatic cystic dsease\par  \par  CT CHEST 10/26/21 5MM RLL NODULE\par  \par  LAB WORKUP\par  (12.3.2021) WBC 5.94, Hgb 6.6, MCV 73.6, RDW 17.2, , Cr 0.5, eGFR 103, Calcium 7.5\par  \par  PATHOLOGY\par  (see results section)\par  \par  HCM\par  Colonoscopy done 12/3/2021 with Dr. Chance showed mass in splenic flexure [FreeTextEntry1] : Started Capecitabine + Oxaliplatin on 1.25.2022 - 03.29.2022 [de-identified] : 2/15/22 Patient is here for a follow-up visit for colon cancer, accompanied by daughter, Shruthi.  She is feeling well with no new complaints.  She is s/p initial cycle of XelOx, started on 1.25.2022.  Reviewed most recent CBC, which is stable.  Patient denies fever, chills, dyspnea, skin rash or bleeding.  She did acknowledge slight change in sensation in hands when exposed to cold.  She developed transient nausea and an episode of vomiting the day following last cycle of chemotherapy but did not have anti-emetics yet; they have anti-emetics stocked at home now.    3/29/22 Patient is here for a follow-up visit for colon cancer, accompanied by daughter, Shruthi.  She is feeling well with no new complaints.  She is due for cycle 4 of XelOx, starting today 3/29.  Reviewed most recent CBC, which is stable.  CEA is 3.7ng/mL from 03/2022.  She does acknowledge slight change in sensation in hands when exposed to cold.  She also developed lateral R sided trunk/rib pain over the last few days, no worse with applied pressure and no clear triggers ; however, she was exercising recently and may have strained reaching upward.  Patient denies fever, chills, dyspnea, skin rash, vomiting or bleeding.    4/20/22 Pt is here for follow up. She is accompanied by daughter, Shruthi. She is feeling well with no new complaints. Denies numbness in her fingers. However, pt complains of lower back pain which is not new and presence prior to chemo. Otherwise, pt denies fever, chills, dyspnea, skin rash, vomiting or bleeding.    6/21/22 Pt is here for followup CT CAP was negative for recurrence CEA is wnl No complaints today She is scheduled to see her GI October 2022  10/7/22 Patient is here for a follow-up visit for colon cancer, accompanied by daughter, Shruthi.  She is feeling well with no new complaints.  She is s/p 4 cycles of XelOx, completed in 03/2022.  Reviewed most recent CBC, which is stable.  CEA is stable and WNL from 09/2022.  Patient denies fever, chills, dyspnea, skin rash, vomiting, abdominal pain or bleeding.  She endorses chronic back pain, which is controlled by pain patch.  She still notes slight change in sensation in hands.    3/17/23 Patient is here for a follow-up visit for colon cancer, accompanied by daughter, Shruthi.  She is feeling well with no new complaints.  Reviewed most recent CBC, which is stable without evidence of anemia.  CEA is stable and WNL from 03/2023.  Patient denies fever, chills, dyspnea, skin rash, vomiting, abdominal pain or bleeding.  Weight remains essentially stable.  She is s/p colonoscopy with Dr. 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  Reviewed most recent CT imaging which shows nonspecific 2.3 cm nodule in the anterior pelvic mesentery.   CT C/A/P (1.3.2023) IMPRESSION:Nonspecific 2.3 cm nodule in the anterior pelvic mesentery. Findings of unclear etiology as patient has history of malignancy which raises concern for metastatic disease, however finding also demonstrate greater than one-year stability/no growth. Clinical correlation needed to determine if needed for workup versus surveillance.Otherwise no definite CT evidence for metastatic disease to the chest, abdomen or pelvis.  7/7/23 Patient is here for a follow-up visit for colon cancer, accompanied by daughter, Shruthi.  She is feeling well with no new complaints.  Reviewed most recent CBC, which is stable without evidence of anemia.  CEA is stable and WNL from 06/2023.  Patient denies fever, chills, dyspnea, skin rash, vomiting, abdominal pain or bleeding.  Weight remains essentially stable.  Reviewed most recent CT imaging which shows stable nonspecific 2.3 cm nodule in the anterior pelvic mesentery.   CT C/A/P (5.12.2023) IMPRESSION:2.3 cm nodule in the mid pelvic mesentery which demonstrates 2 years of stability/no growth. Given history of malignancy, clinical correlation is needed needed to determine the need for workup versus surveillance.Otherwise no definite CT evidence for metastatic disease to the chest, abdomen or pelvis.  1/12/24 Reviewed most recent CT imaging which shows stable left hemiabdomen soft tissue lesion and LLL groundglass nodule.   CT C/A/P (1.6.2024)IMPRESSION:Since May 12, 2023;No definite evidence for new metastatic disease within the chest, abdomen or pelvis.Soft tissue lesion measuring 2.3 x 1.9 cm within the left hemiabdomen, possibly representing previously described nodule in the mid pelvic mesenteric, and unchanged.Left lower lobe 0.3 cm groundglass micronodule, not definitely visualized on prior examination. Attention on follow-up recommended.

## 2024-01-12 NOTE — ASSESSMENT
[FreeTextEntry1] : 64 yo woman with T3N1 (Stage III) left sided colon moderately differentiated adenocarcinoma ; s/p robotic splenic flexure resection on 12/16/2021. She presents to the oncology clinic for follow up.  # T3N1 (Stage III) left sided colon moderately differentiated adenocarcinoma ; s/p robotic splenic flexure resection on 12/16/2021; ECOG 0-1 - Discussed adjuvant chemo: offered FOLFOX vs XELOX but she chose XELOX for 3 months - s/p Oxaliplatin + Capecitabine 1500mg in AM + 1000mg in PM daily for 2 weeks on, 1 week break every 21 days from 1.25.2022 - 03.29.2022 - s/p colonoscopy at 1 year post surgery in 11/2022 with Dr. Rhoades ; reportedly normal. Due again in 2025 but recommend to be done within the next year or so for closer surveillance. - CT C/A/P 5.12.2023 shows stable nonspecific 2.3 cm nodule in the anterior pelvic mesentery - CT C/A/P 1.6.2024 shows stable left hemiabdomen soft tissue lesion and LLL groundglass nodule.   - will continue with close surveillance for now - plan to repeat CT imaging at 6 month ba around 07/2024 , will order at next visit  # Anemia, microcytic; at least partially due to iron deficiency - s/p Venofer 200mg IV weekly x 5 doses , completed on 2.25.2022 - repeat CBC shows hgb stable at 13.8g/dL - no additional IV iron needed at this time  RTC in 3 months with CBC, BMP, LFTs, CEA, iron studies, ferritin level prior  - colonoscopy within the next 3 years; however, i support if GI would perform them annually - CT CAP IVC every 6 months; scheduled for January 2024: tests reviewed; in remission - colonoscopy done 01/2024; biopsy pending - CEA.

## 2024-04-22 NOTE — DISCHARGE NOTE NURSING/CASE MANAGEMENT/SOCIAL WORK - CASE MANAGER'S NAME
Magaly Campos  [id number]  4/22/2024      Procedure  Sacroiliac joint injection(s), laterality as below    Consent Obtained  After reviewing the potential risks and benefits as well as the performance of the procedure with the patient, an informed written consent was obtained.    Pre-operative Diagnosis  Sacroiliac joint dysfunction    Post-operative Diagnosis  Sacroiliac joint dysfunction    Indications  Sacroiliac pain     Proceduralist  Surgeons and Role:     * Sonja Sol MD - Primary    Findings  As expected    Specimens removed  None    Implants  None    Anesthesia  Local only    Estimated blood loss  Minimal    Procedure  Procedures:    * right SACROILIAC JOINT INJECTION (CPT 36735)     Attending Staff  Sonja Sol MD, [facility]    Procedure Specifics  Approach: Posterior oblique  Imaging guidance: Fluoroscopic views  Skin prep: ”Chloraprep”  Skin/subcutaneous anesthetic: ”1% lidocaine”  Procedure needle:  ”22g 3.5” spinal needle”  Contrast media: iohexol (Omnipaque) 180 mg/ml   Steroid medication type: methylprednisolone acetate (depo-medrol) 40 mg/ml, 1 ml  Medication used for flush: 0.2% ropivacaine, 2 ml    Description of Procedure  After having signed the informed consent, the patient was placed in the prone position.  The patient's back was prepped with skin prep solution and draped in a sterile fashion. Local anesthetic was used to anesthetize the skin and underlying tissues.  Under fluoroscopic guidance, the procedure needle was advanced slightly medial to lateral to lie within the inferior pole of the right sacroiliac joint.  There were no paresthesias or heme aspiration. Needle placement was confirmed using AP and lateral views.  After negative aspiration, contrast media was injected. Appropriate contrast spread was noted.    Steroid solution was then injected into the targeted sacroiliac joint followed by the flush solution.  The needle was withdrawn without any immediate reported complications.        Oly Park RN      Photographs  See EPIC     Complications  The patient did not experience any complications    Sonja Sol MD, FASA

## 2024-06-26 ENCOUNTER — OUTPATIENT (OUTPATIENT)
Dept: OUTPATIENT SERVICES | Facility: HOSPITAL | Age: 64
LOS: 1 days | End: 2024-06-26
Payer: MEDICARE

## 2024-06-26 ENCOUNTER — APPOINTMENT (OUTPATIENT)
Age: 64
End: 2024-06-26

## 2024-06-26 ENCOUNTER — LABORATORY RESULT (OUTPATIENT)
Age: 64
End: 2024-06-26

## 2024-06-26 DIAGNOSIS — C18.4 MALIGNANT NEOPLASM OF TRANSVERSE COLON: ICD-10-CM

## 2024-06-26 DIAGNOSIS — Z98.890 OTHER SPECIFIED POSTPROCEDURAL STATES: Chronic | ICD-10-CM

## 2024-06-26 PROCEDURE — 83540 ASSAY OF IRON: CPT

## 2024-06-26 PROCEDURE — 83550 IRON BINDING TEST: CPT

## 2024-06-26 PROCEDURE — 36415 COLL VENOUS BLD VENIPUNCTURE: CPT

## 2024-06-26 PROCEDURE — 80076 HEPATIC FUNCTION PANEL: CPT

## 2024-06-26 PROCEDURE — 82728 ASSAY OF FERRITIN: CPT

## 2024-06-26 PROCEDURE — 85027 COMPLETE CBC AUTOMATED: CPT

## 2024-06-26 PROCEDURE — 82378 CARCINOEMBRYONIC ANTIGEN: CPT

## 2024-06-26 PROCEDURE — 80048 BASIC METABOLIC PNL TOTAL CA: CPT

## 2024-06-27 DIAGNOSIS — C18.4 MALIGNANT NEOPLASM OF TRANSVERSE COLON: ICD-10-CM

## 2024-07-01 LAB
ALBUMIN SERPL ELPH-MCNC: 4.8 G/DL
ALP BLD-CCNC: 84 U/L
ALT SERPL-CCNC: 28 U/L
ANION GAP SERPL CALC-SCNC: 9 MMOL/L
AST SERPL-CCNC: 26 U/L
BILIRUB DIRECT SERPL-MCNC: <0.2 MG/DL
BILIRUB INDIRECT SERPL-MCNC: >0.4 MG/DL
BILIRUB SERPL-MCNC: 0.6 MG/DL
BUN SERPL-MCNC: 16 MG/DL
CALCIUM SERPL-MCNC: 9.8 MG/DL
CEA SERPL-MCNC: 3.1 NG/ML
CHLORIDE SERPL-SCNC: 105 MMOL/L
CO2 SERPL-SCNC: 28 MMOL/L
CREAT SERPL-MCNC: 0.7 MG/DL
EGFR: 95 ML/MIN/1.73M2
FERRITIN SERPL-MCNC: 223 NG/ML
GLUCOSE SERPL-MCNC: 80 MG/DL
HCT VFR BLD CALC: 40.8 %
HGB BLD-MCNC: 13.5 G/DL
IRON SATN MFR SERPL: 33 %
IRON SERPL-MCNC: 97 UG/DL
MCHC RBC-ENTMCNC: 29.3 PG
MCHC RBC-ENTMCNC: 33.1 G/DL
MCV RBC AUTO: 88.7 FL
PLATELET # BLD AUTO: 224 K/UL
PMV BLD: 10.8 FL
POTASSIUM SERPL-SCNC: 5.1 MMOL/L
PROT SERPL-MCNC: 7.3 G/DL
RBC # BLD: 4.6 M/UL
RBC # FLD: 13.7 %
SODIUM SERPL-SCNC: 142 MMOL/L
TIBC SERPL-MCNC: 296 UG/DL
UIBC SERPL-MCNC: 199 UG/DL
WBC # FLD AUTO: 6.49 K/UL

## 2024-07-08 ENCOUNTER — OUTPATIENT (OUTPATIENT)
Dept: OUTPATIENT SERVICES | Facility: HOSPITAL | Age: 64
LOS: 1 days | End: 2024-07-08
Payer: MEDICARE

## 2024-07-08 ENCOUNTER — RESULT REVIEW (OUTPATIENT)
Age: 64
End: 2024-07-08

## 2024-07-08 DIAGNOSIS — Z98.890 OTHER SPECIFIED POSTPROCEDURAL STATES: Chronic | ICD-10-CM

## 2024-07-08 DIAGNOSIS — Z00.8 ENCOUNTER FOR OTHER GENERAL EXAMINATION: ICD-10-CM

## 2024-07-08 DIAGNOSIS — C18.4 MALIGNANT NEOPLASM OF TRANSVERSE COLON: ICD-10-CM

## 2024-07-08 PROCEDURE — 74177 CT ABD & PELVIS W/CONTRAST: CPT | Mod: 26

## 2024-07-08 PROCEDURE — 74177 CT ABD & PELVIS W/CONTRAST: CPT

## 2024-07-08 PROCEDURE — 71260 CT THORAX DX C+: CPT | Mod: 26

## 2024-07-08 PROCEDURE — 71260 CT THORAX DX C+: CPT

## 2024-07-09 DIAGNOSIS — C18.4 MALIGNANT NEOPLASM OF TRANSVERSE COLON: ICD-10-CM

## 2024-07-12 ENCOUNTER — APPOINTMENT (OUTPATIENT)
Dept: HEMATOLOGY ONCOLOGY | Facility: CLINIC | Age: 64
End: 2024-07-12

## 2024-09-26 ENCOUNTER — APPOINTMENT (OUTPATIENT)
Age: 64
End: 2024-09-26
Payer: MEDICARE

## 2024-09-26 ENCOUNTER — OUTPATIENT (OUTPATIENT)
Dept: OUTPATIENT SERVICES | Facility: HOSPITAL | Age: 64
LOS: 1 days | End: 2024-09-26
Payer: MEDICARE

## 2024-09-26 ENCOUNTER — LABORATORY RESULT (OUTPATIENT)
Age: 64
End: 2024-09-26

## 2024-09-26 VITALS
WEIGHT: 119 LBS | SYSTOLIC BLOOD PRESSURE: 126 MMHG | RESPIRATION RATE: 17 BRPM | HEIGHT: 62 IN | BODY MASS INDEX: 21.9 KG/M2 | TEMPERATURE: 98.7 F | OXYGEN SATURATION: 99 % | HEART RATE: 75 BPM | DIASTOLIC BLOOD PRESSURE: 77 MMHG

## 2024-09-26 DIAGNOSIS — D50.9 IRON DEFICIENCY ANEMIA, UNSPECIFIED: ICD-10-CM

## 2024-09-26 DIAGNOSIS — C18.4 MALIGNANT NEOPLASM OF TRANSVERSE COLON: ICD-10-CM

## 2024-09-26 DIAGNOSIS — Z98.890 OTHER SPECIFIED POSTPROCEDURAL STATES: Chronic | ICD-10-CM

## 2024-09-26 LAB
ALBUMIN SERPL ELPH-MCNC: 4.3 G/DL
ALP BLD-CCNC: 100 U/L
ALT SERPL-CCNC: 35 U/L
ANION GAP SERPL CALC-SCNC: 10 MMOL/L
AST SERPL-CCNC: 30 U/L
BILIRUB DIRECT SERPL-MCNC: 0.2 MG/DL
BILIRUB INDIRECT SERPL-MCNC: 0.3 MG/DL
BILIRUB SERPL-MCNC: 0.5 MG/DL
BUN SERPL-MCNC: 17 MG/DL
CALCIUM SERPL-MCNC: 9.5 MG/DL
CHLORIDE SERPL-SCNC: 107 MMOL/L
CO2 SERPL-SCNC: 26 MMOL/L
CREAT SERPL-MCNC: 0.7 MG/DL
EGFR: 95 ML/MIN/1.73M2
GLUCOSE SERPL-MCNC: 94 MG/DL
HCT VFR BLD CALC: 40.1 %
HGB BLD-MCNC: 13.4 G/DL
IRON SATN MFR SERPL: 35 %
IRON SERPL-MCNC: 103 UG/DL
MCHC RBC-ENTMCNC: 30.5 PG
MCHC RBC-ENTMCNC: 33.4 G/DL
MCV RBC AUTO: 91.1 FL
PLATELET # BLD AUTO: 228 K/UL
PMV BLD: 11.4 FL
POTASSIUM SERPL-SCNC: 4.2 MMOL/L
PROT SERPL-MCNC: 7.1 G/DL
RBC # BLD: 4.4 M/UL
RBC # FLD: 13.1 %
SODIUM SERPL-SCNC: 143 MMOL/L
TIBC SERPL-MCNC: 294 UG/DL
UIBC SERPL-MCNC: 191 UG/DL
WBC # FLD AUTO: 6.29 K/UL

## 2024-09-26 PROCEDURE — 99214 OFFICE O/P EST MOD 30 MIN: CPT

## 2024-09-26 PROCEDURE — 80076 HEPATIC FUNCTION PANEL: CPT

## 2024-09-26 PROCEDURE — G2211 COMPLEX E/M VISIT ADD ON: CPT

## 2024-09-26 PROCEDURE — 83540 ASSAY OF IRON: CPT

## 2024-09-26 PROCEDURE — 83550 IRON BINDING TEST: CPT

## 2024-09-26 PROCEDURE — 82728 ASSAY OF FERRITIN: CPT

## 2024-09-26 PROCEDURE — 80048 BASIC METABOLIC PNL TOTAL CA: CPT

## 2024-09-26 PROCEDURE — 82378 CARCINOEMBRYONIC ANTIGEN: CPT

## 2024-09-26 PROCEDURE — 85027 COMPLETE CBC AUTOMATED: CPT

## 2024-09-26 NOTE — HISTORY OF PRESENT ILLNESS
[de-identified] : Ms. WILFREDO SARKAR is a 61 year old female here today for evaluation and management of Colon Cancer.\par  \par  She was referred by GI, Dr. Becerra.  WILFREDO is a 61 year old F with PMHx including DM, who presents to clinic to establish care.   She originally presented to ER with chief complaint of abdominal pain x3 days, described as sharp in nature diffusely with no radiation or noted aggravating/alleviating factors.  Patient reported that she had not passed\par  her bowels for 3 days prior and began to develop multiple episodes of NBNB emesis.  She is presently feeling well with no new complaints.  Patient denies fever, chills, nausea, vomiting, dyspnea, unintentional weight loss or bleeding.  Patient reports no pertinent personal or family history of malignancy or blood/clotting disorder.\par  \par  \par  RADIOLOGIC WORKUP\par  CT A/P (12.2.2021) IMPRESSION:Distal small bowel and and proximal large bowel obstruction to the level of the splenic flexure where there is a colonic mural nodule measuring 1.2 cm, concerning for primary neoplasm. Direct visualization with colonoscopy is suggested.Numerous bilobar hepatic hypodensities measuring simple fluid and likely reflecting cysts. However, in view of the above findings, metastatic disease cannot be excluded. Further evaluation with MRI with and without contrast may be of benefit.\par  \par  MRI ABDOMEN 11.16.21:  no evience of liver mets; hepatometaly with hepatic cystic dsease\par  \par  CT CHEST 10/26/21 5MM RLL NODULE\par  \par  LAB WORKUP\par  (12.3.2021) WBC 5.94, Hgb 6.6, MCV 73.6, RDW 17.2, , Cr 0.5, eGFR 103, Calcium 7.5\par  \par  PATHOLOGY\par  (see results section)\par  \par  HCM\par  Colonoscopy done 12/3/2021 with Dr. Chance showed mass in splenic flexure [FreeTextEntry1] : Started Capecitabine + Oxaliplatin on 1.25.2022 - 03.29.2022 [de-identified] : 2/15/22 Patient is here for a follow-up visit for colon cancer, accompanied by daughter, Shruthi.  She is feeling well with no new complaints.  She is s/p initial cycle of XelOx, started on 1.25.2022.  Reviewed most recent CBC, which is stable.  Patient denies fever, chills, dyspnea, skin rash or bleeding.  She did acknowledge slight change in sensation in hands when exposed to cold.  She developed transient nausea and an episode of vomiting the day following last cycle of chemotherapy but did not have anti-emetics yet; they have anti-emetics stocked at home now.    3/29/22 Patient is here for a follow-up visit for colon cancer, accompanied by daughter, Shruthi.  She is feeling well with no new complaints.  She is due for cycle 4 of XelOx, starting today 3/29.  Reviewed most recent CBC, which is stable.  CEA is 3.7ng/mL from 03/2022.  She does acknowledge slight change in sensation in hands when exposed to cold.  She also developed lateral R sided trunk/rib pain over the last few days, no worse with applied pressure and no clear triggers ; however, she was exercising recently and may have strained reaching upward.  Patient denies fever, chills, dyspnea, skin rash, vomiting or bleeding.    4/20/22 Pt is here for follow up. She is accompanied by daughter, Shurthi. She is feeling well with no new complaints. Denies numbness in her fingers. However, pt complains of lower back pain which is not new and presence prior to chemo. Otherwise, pt denies fever, chills, dyspnea, skin rash, vomiting or bleeding.    6/21/22 Pt is here for followup CT CAP was negative for recurrence CEA is wnl No complaints today She is scheduled to see her GI October 2022  10/7/22 Patient is here for a follow-up visit for colon cancer, accompanied by daughter, Shruthi.  She is feeling well with no new complaints.  She is s/p 4 cycles of XelOx, completed in 03/2022.  Reviewed most recent CBC, which is stable.  CEA is stable and WNL from 09/2022.  Patient denies fever, chills, dyspnea, skin rash, vomiting, abdominal pain or bleeding.  She endorses chronic back pain, which is controlled by pain patch.  She still notes slight change in sensation in hands.    3/17/23 Patient is here for a follow-up visit for colon cancer, accompanied by daughter, Shruthi.  She is feeling well with no new complaints.  Reviewed most recent CBC, which is stable without evidence of anemia.  CEA is stable and WNL from 03/2023.  Patient denies fever, chills, dyspnea, skin rash, vomiting, abdominal pain or bleeding.  Weight remains essentially stable.  She is s/p colonoscopy with Dr. 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  Reviewed most recent CT imaging which shows nonspecific 2.3 cm nodule in the anterior pelvic mesentery.   CT C/A/P (1.3.2023) IMPRESSION:Nonspecific 2.3 cm nodule in the anterior pelvic mesentery. Findings of unclear etiology as patient has history of malignancy which raises concern for metastatic disease, however finding also demonstrate greater than one-year stability/no growth. Clinical correlation needed to determine if needed for workup versus surveillance.Otherwise no definite CT evidence for metastatic disease to the chest, abdomen or pelvis.  7/7/23 Patient is here for a follow-up visit for colon cancer, accompanied by daughter, Shruthi.  She is feeling well with no new complaints.  Reviewed most recent CBC, which is stable without evidence of anemia.  CEA is stable and WNL from 06/2023.  Patient denies fever, chills, dyspnea, skin rash, vomiting, abdominal pain or bleeding.  Weight remains essentially stable.  Reviewed most recent CT imaging which shows stable nonspecific 2.3 cm nodule in the anterior pelvic mesentery.   CT C/A/P (5.12.2023) IMPRESSION:2.3 cm nodule in the mid pelvic mesentery which demonstrates 2 years of stability/no growth. Given history of malignancy, clinical correlation is needed needed to determine the need for workup versus surveillance.Otherwise no definite CT evidence for metastatic disease to the chest, abdomen or pelvis.  1/12/24 Reviewed most recent CT imaging which shows stable left hemiabdomen soft tissue lesion and LLL groundglass nodule.   CT C/A/P (1.6.2024)IMPRESSION:Since May 12, 2023;No definite evidence for new metastatic disease within the chest, abdomen or pelvis.Soft tissue lesion measuring 2.3 x 1.9 cm within the left hemiabdomen, possibly representing previously described nodule in the mid pelvic mesenteric, and unchanged.Left lower lobe 0.3 cm groundglass micronodule, not definitely visualized on prior examination. Attention on follow-up recommended.  9/26/24 Patient is here for a follow-up visit for colon cancer, accompanied by daughter.  She is feeling well with no new complaints.  Reviewed most recent CBC, which is stable without evidence of anemia.  CEA is stable and WNL from 06/2024.  Patient denies fever, chills, dyspnea, skin rash, vomiting, abdominal pain or bleeding.  Reviewed most recent CT imaging which shows 2.0 cm soft tissue nodule at the root of the small bowel mesentery may represent previously described nodule within the left hemiabdomen.  She lost some weight which is attributed to exercise; appetite is good.   CT C/A/P (7.8.2024) IMPRESSION:Since January 6, 2024No current CT evidence of active intrathoracic disease.No suspicious pulmonary nodules. Stable 3 mm nonsolid nodule, left lower lobe (303/99).Stable bilateral posterior right lower lobe subpleural nodularity.  Since January 6, 2024, no definite evidence of new intra-abdominal or pelvic metastasis. A 2.0 cm soft tissue nodule at the root of the small bowel mesentery may represent previously described nodule within the left hemiabdomen.

## 2024-09-26 NOTE — ASSESSMENT
[FreeTextEntry1] : 63 yo woman with T3N1 (Stage III) left sided colon moderately differentiated adenocarcinoma ; s/p robotic splenic flexure resection on 12/16/2021. She presents to the oncology clinic for follow up.  # T3N1 (Stage III) left sided colon moderately differentiated adenocarcinoma ; s/p robotic splenic flexure resection on 12/16/2021; ECOG 0-1 - Discussed adjuvant chemo: offered FOLFOX vs XELOX but she chose XELOX for 3 months - s/p Oxaliplatin + Capecitabine 1500mg in AM + 1000mg in PM daily for 2 weeks on, 1 week break every 21 days from 1.25.2022 - 03.29.2022 - s/p colonoscopy at 1 year post surgery in 11/2022 with Dr. Rhoades ; reportedly normal.  - CT C/A/P 5.12.2023 shows stable nonspecific 2.3 cm nodule in the anterior pelvic mesentery - CT C/A/P 1.6.2024 shows stable left hemiabdomen soft tissue lesion and LLL groundglass nodule.   - CT C/A/P 7.8.2024 stable 3 mm LLL nonsolid nodule, stable bilateral posterior right lower lobe subpleural nodularity and 2.0 cm soft tissue nodule at the root of the small bowel mesentery may represent previously described nodule within the left hemiabdomen. - CT C/A/P with IVC ordered to be done ~01/2025, Rx given  - followup with GI as scheduled for colonoscopy (due in 2025 but recommend to be done sooner for close surveillance)  - will continue with close surveillance for now   # Anemia, microcytic; at least partially due to iron deficiency - s/p Venofer 200mg IV weekly x 5 doses , completed on 2.25.2022 - Labwork today: CBC, BMP, LFTs, CEA, iron studies, ferritin   RTC in 01/2025 with Dr. Fajardo with CBC, BMP, LFTs, CEA, iron studies, ferritin level 2 days prior  SEEn/examined w/ NP Jody;note reviewed; case discussed; agree w/plan

## 2024-09-26 NOTE — HISTORY OF PRESENT ILLNESS
[de-identified] : Ms. WILFREDO SARKAR is a 61 year old female here today for evaluation and management of Colon Cancer.\par  \par  She was referred by GI, Dr. Becerra.  WILFREDO is a 61 year old F with PMHx including DM, who presents to clinic to establish care.   She originally presented to ER with chief complaint of abdominal pain x3 days, described as sharp in nature diffusely with no radiation or noted aggravating/alleviating factors.  Patient reported that she had not passed\par  her bowels for 3 days prior and began to develop multiple episodes of NBNB emesis.  She is presently feeling well with no new complaints.  Patient denies fever, chills, nausea, vomiting, dyspnea, unintentional weight loss or bleeding.  Patient reports no pertinent personal or family history of malignancy or blood/clotting disorder.\par  \par  \par  RADIOLOGIC WORKUP\par  CT A/P (12.2.2021) IMPRESSION:Distal small bowel and and proximal large bowel obstruction to the level of the splenic flexure where there is a colonic mural nodule measuring 1.2 cm, concerning for primary neoplasm. Direct visualization with colonoscopy is suggested.Numerous bilobar hepatic hypodensities measuring simple fluid and likely reflecting cysts. However, in view of the above findings, metastatic disease cannot be excluded. Further evaluation with MRI with and without contrast may be of benefit.\par  \par  MRI ABDOMEN 11.16.21:  no evience of liver mets; hepatometaly with hepatic cystic dsease\par  \par  CT CHEST 10/26/21 5MM RLL NODULE\par  \par  LAB WORKUP\par  (12.3.2021) WBC 5.94, Hgb 6.6, MCV 73.6, RDW 17.2, , Cr 0.5, eGFR 103, Calcium 7.5\par  \par  PATHOLOGY\par  (see results section)\par  \par  HCM\par  Colonoscopy done 12/3/2021 with Dr. Chance showed mass in splenic flexure [FreeTextEntry1] : Started Capecitabine + Oxaliplatin on 1.25.2022 - 03.29.2022 [de-identified] : 2/15/22 Patient is here for a follow-up visit for colon cancer, accompanied by daughter, Shruthi.  She is feeling well with no new complaints.  She is s/p initial cycle of XelOx, started on 1.25.2022.  Reviewed most recent CBC, which is stable.  Patient denies fever, chills, dyspnea, skin rash or bleeding.  She did acknowledge slight change in sensation in hands when exposed to cold.  She developed transient nausea and an episode of vomiting the day following last cycle of chemotherapy but did not have anti-emetics yet; they have anti-emetics stocked at home now.    3/29/22 Patient is here for a follow-up visit for colon cancer, accompanied by daughter, Shruthi.  She is feeling well with no new complaints.  She is due for cycle 4 of XelOx, starting today 3/29.  Reviewed most recent CBC, which is stable.  CEA is 3.7ng/mL from 03/2022.  She does acknowledge slight change in sensation in hands when exposed to cold.  She also developed lateral R sided trunk/rib pain over the last few days, no worse with applied pressure and no clear triggers ; however, she was exercising recently and may have strained reaching upward.  Patient denies fever, chills, dyspnea, skin rash, vomiting or bleeding.    4/20/22 Pt is here for follow up. She is accompanied by daughter, Shruthi. She is feeling well with no new complaints. Denies numbness in her fingers. However, pt complains of lower back pain which is not new and presence prior to chemo. Otherwise, pt denies fever, chills, dyspnea, skin rash, vomiting or bleeding.    6/21/22 Pt is here for followup CT CAP was negative for recurrence CEA is wnl No complaints today She is scheduled to see her GI October 2022  10/7/22 Patient is here for a follow-up visit for colon cancer, accompanied by daughter, Shruthi.  She is feeling well with no new complaints.  She is s/p 4 cycles of XelOx, completed in 03/2022.  Reviewed most recent CBC, which is stable.  CEA is stable and WNL from 09/2022.  Patient denies fever, chills, dyspnea, skin rash, vomiting, abdominal pain or bleeding.  She endorses chronic back pain, which is controlled by pain patch.  She still notes slight change in sensation in hands.    3/17/23 Patient is here for a follow-up visit for colon cancer, accompanied by daughter, Shruthi.  She is feeling well with no new complaints.  Reviewed most recent CBC, which is stable without evidence of anemia.  CEA is stable and WNL from 03/2023.  Patient denies fever, chills, dyspnea, skin rash, vomiting, abdominal pain or bleeding.  Weight remains essentially stable.  She is s/p colonoscopy with Dr. 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  Reviewed most recent CT imaging which shows nonspecific 2.3 cm nodule in the anterior pelvic mesentery.   CT C/A/P (1.3.2023) IMPRESSION:Nonspecific 2.3 cm nodule in the anterior pelvic mesentery. Findings of unclear etiology as patient has history of malignancy which raises concern for metastatic disease, however finding also demonstrate greater than one-year stability/no growth. Clinical correlation needed to determine if needed for workup versus surveillance.Otherwise no definite CT evidence for metastatic disease to the chest, abdomen or pelvis.  7/7/23 Patient is here for a follow-up visit for colon cancer, accompanied by daughter, Shruthi.  She is feeling well with no new complaints.  Reviewed most recent CBC, which is stable without evidence of anemia.  CEA is stable and WNL from 06/2023.  Patient denies fever, chills, dyspnea, skin rash, vomiting, abdominal pain or bleeding.  Weight remains essentially stable.  Reviewed most recent CT imaging which shows stable nonspecific 2.3 cm nodule in the anterior pelvic mesentery.   CT C/A/P (5.12.2023) IMPRESSION:2.3 cm nodule in the mid pelvic mesentery which demonstrates 2 years of stability/no growth. Given history of malignancy, clinical correlation is needed needed to determine the need for workup versus surveillance.Otherwise no definite CT evidence for metastatic disease to the chest, abdomen or pelvis.  1/12/24 Reviewed most recent CT imaging which shows stable left hemiabdomen soft tissue lesion and LLL groundglass nodule.   CT C/A/P (1.6.2024)IMPRESSION:Since May 12, 2023;No definite evidence for new metastatic disease within the chest, abdomen or pelvis.Soft tissue lesion measuring 2.3 x 1.9 cm within the left hemiabdomen, possibly representing previously described nodule in the mid pelvic mesenteric, and unchanged.Left lower lobe 0.3 cm groundglass micronodule, not definitely visualized on prior examination. Attention on follow-up recommended.  9/26/24 Patient is here for a follow-up visit for colon cancer, accompanied by daughter.  She is feeling well with no new complaints.  Reviewed most recent CBC, which is stable without evidence of anemia.  CEA is stable and WNL from 06/2024.  Patient denies fever, chills, dyspnea, skin rash, vomiting, abdominal pain or bleeding.  Reviewed most recent CT imaging which shows 2.0 cm soft tissue nodule at the root of the small bowel mesentery may represent previously described nodule within the left hemiabdomen.  She lost some weight which is attributed to exercise; appetite is good.   CT C/A/P (7.8.2024) IMPRESSION:Since January 6, 2024No current CT evidence of active intrathoracic disease.No suspicious pulmonary nodules. Stable 3 mm nonsolid nodule, left lower lobe (303/99).Stable bilateral posterior right lower lobe subpleural nodularity.  Since January 6, 2024, no definite evidence of new intra-abdominal or pelvic metastasis. A 2.0 cm soft tissue nodule at the root of the small bowel mesentery may represent previously described nodule within the left hemiabdomen.

## 2024-09-26 NOTE — HISTORY OF PRESENT ILLNESS
[de-identified] : Ms. WILFREDO SARKAR is a 61 year old female here today for evaluation and management of Colon Cancer.\par  \par  She was referred by GI, Dr. Becerra.  WILFREDO is a 61 year old F with PMHx including DM, who presents to clinic to establish care.   She originally presented to ER with chief complaint of abdominal pain x3 days, described as sharp in nature diffusely with no radiation or noted aggravating/alleviating factors.  Patient reported that she had not passed\par  her bowels for 3 days prior and began to develop multiple episodes of NBNB emesis.  She is presently feeling well with no new complaints.  Patient denies fever, chills, nausea, vomiting, dyspnea, unintentional weight loss or bleeding.  Patient reports no pertinent personal or family history of malignancy or blood/clotting disorder.\par  \par  \par  RADIOLOGIC WORKUP\par  CT A/P (12.2.2021) IMPRESSION:Distal small bowel and and proximal large bowel obstruction to the level of the splenic flexure where there is a colonic mural nodule measuring 1.2 cm, concerning for primary neoplasm. Direct visualization with colonoscopy is suggested.Numerous bilobar hepatic hypodensities measuring simple fluid and likely reflecting cysts. However, in view of the above findings, metastatic disease cannot be excluded. Further evaluation with MRI with and without contrast may be of benefit.\par  \par  MRI ABDOMEN 11.16.21:  no evience of liver mets; hepatometaly with hepatic cystic dsease\par  \par  CT CHEST 10/26/21 5MM RLL NODULE\par  \par  LAB WORKUP\par  (12.3.2021) WBC 5.94, Hgb 6.6, MCV 73.6, RDW 17.2, , Cr 0.5, eGFR 103, Calcium 7.5\par  \par  PATHOLOGY\par  (see results section)\par  \par  HCM\par  Colonoscopy done 12/3/2021 with Dr. Chance showed mass in splenic flexure [FreeTextEntry1] : Started Capecitabine + Oxaliplatin on 1.25.2022 - 03.29.2022 [de-identified] : 2/15/22 Patient is here for a follow-up visit for colon cancer, accompanied by daughter, Shruthi.  She is feeling well with no new complaints.  She is s/p initial cycle of XelOx, started on 1.25.2022.  Reviewed most recent CBC, which is stable.  Patient denies fever, chills, dyspnea, skin rash or bleeding.  She did acknowledge slight change in sensation in hands when exposed to cold.  She developed transient nausea and an episode of vomiting the day following last cycle of chemotherapy but did not have anti-emetics yet; they have anti-emetics stocked at home now.    3/29/22 Patient is here for a follow-up visit for colon cancer, accompanied by daughter, Shruthi.  She is feeling well with no new complaints.  She is due for cycle 4 of XelOx, starting today 3/29.  Reviewed most recent CBC, which is stable.  CEA is 3.7ng/mL from 03/2022.  She does acknowledge slight change in sensation in hands when exposed to cold.  She also developed lateral R sided trunk/rib pain over the last few days, no worse with applied pressure and no clear triggers ; however, she was exercising recently and may have strained reaching upward.  Patient denies fever, chills, dyspnea, skin rash, vomiting or bleeding.    4/20/22 Pt is here for follow up. She is accompanied by daughter, Shruthi. She is feeling well with no new complaints. Denies numbness in her fingers. However, pt complains of lower back pain which is not new and presence prior to chemo. Otherwise, pt denies fever, chills, dyspnea, skin rash, vomiting or bleeding.    6/21/22 Pt is here for followup CT CAP was negative for recurrence CEA is wnl No complaints today She is scheduled to see her GI October 2022  10/7/22 Patient is here for a follow-up visit for colon cancer, accompanied by daughter, Shruthi.  She is feeling well with no new complaints.  She is s/p 4 cycles of XelOx, completed in 03/2022.  Reviewed most recent CBC, which is stable.  CEA is stable and WNL from 09/2022.  Patient denies fever, chills, dyspnea, skin rash, vomiting, abdominal pain or bleeding.  She endorses chronic back pain, which is controlled by pain patch.  She still notes slight change in sensation in hands.    3/17/23 Patient is here for a follow-up visit for colon cancer, accompanied by daughter, Shruthi.  She is feeling well with no new complaints.  Reviewed most recent CBC, which is stable without evidence of anemia.  CEA is stable and WNL from 03/2023.  Patient denies fever, chills, dyspnea, skin rash, vomiting, abdominal pain or bleeding.  Weight remains essentially stable.  She is s/p colonoscopy with Dr. 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  Reviewed most recent CT imaging which shows nonspecific 2.3 cm nodule in the anterior pelvic mesentery.   CT C/A/P (1.3.2023) IMPRESSION:Nonspecific 2.3 cm nodule in the anterior pelvic mesentery. Findings of unclear etiology as patient has history of malignancy which raises concern for metastatic disease, however finding also demonstrate greater than one-year stability/no growth. Clinical correlation needed to determine if needed for workup versus surveillance.Otherwise no definite CT evidence for metastatic disease to the chest, abdomen or pelvis.  7/7/23 Patient is here for a follow-up visit for colon cancer, accompanied by daughter, Shruthi.  She is feeling well with no new complaints.  Reviewed most recent CBC, which is stable without evidence of anemia.  CEA is stable and WNL from 06/2023.  Patient denies fever, chills, dyspnea, skin rash, vomiting, abdominal pain or bleeding.  Weight remains essentially stable.  Reviewed most recent CT imaging which shows stable nonspecific 2.3 cm nodule in the anterior pelvic mesentery.   CT C/A/P (5.12.2023) IMPRESSION:2.3 cm nodule in the mid pelvic mesentery which demonstrates 2 years of stability/no growth. Given history of malignancy, clinical correlation is needed needed to determine the need for workup versus surveillance.Otherwise no definite CT evidence for metastatic disease to the chest, abdomen or pelvis.  1/12/24 Reviewed most recent CT imaging which shows stable left hemiabdomen soft tissue lesion and LLL groundglass nodule.   CT C/A/P (1.6.2024)IMPRESSION:Since May 12, 2023;No definite evidence for new metastatic disease within the chest, abdomen or pelvis.Soft tissue lesion measuring 2.3 x 1.9 cm within the left hemiabdomen, possibly representing previously described nodule in the mid pelvic mesenteric, and unchanged.Left lower lobe 0.3 cm groundglass micronodule, not definitely visualized on prior examination. Attention on follow-up recommended.  9/26/24 Patient is here for a follow-up visit for colon cancer, accompanied by daughter.  She is feeling well with no new complaints.  Reviewed most recent CBC, which is stable without evidence of anemia.  CEA is stable and WNL from 06/2024.  Patient denies fever, chills, dyspnea, skin rash, vomiting, abdominal pain or bleeding.  Reviewed most recent CT imaging which shows 2.0 cm soft tissue nodule at the root of the small bowel mesentery may represent previously described nodule within the left hemiabdomen.  She lost some weight which is attributed to exercise; appetite is good.   CT C/A/P (7.8.2024) IMPRESSION:Since January 6, 2024No current CT evidence of active intrathoracic disease.No suspicious pulmonary nodules. Stable 3 mm nonsolid nodule, left lower lobe (303/99).Stable bilateral posterior right lower lobe subpleural nodularity.  Since January 6, 2024, no definite evidence of new intra-abdominal or pelvic metastasis. A 2.0 cm soft tissue nodule at the root of the small bowel mesentery may represent previously described nodule within the left hemiabdomen.

## 2024-09-26 NOTE — REVIEW OF SYSTEMS
[Negative] : Allergic/Immunologic [Fever] : no fever [Eye Pain] : no eye pain [Dysphagia] : no dysphagia [Chest Pain] : no chest pain [Shortness Of Breath] : no shortness of breath [Abdominal Pain] : no abdominal pain [Vomiting] : no vomiting [Dysuria] : no dysuria [Joint Pain] : no joint pain [Confused] : no confusion [Suicidal] : not suicidal [Proptosis] : no proptosis [FreeTextEntry7] :  lateral R sided chronic back pain , follows with [facility]  [de-identified] : slight change in sensation in hands following chemotherapy , gradually improving

## 2024-09-26 NOTE — BEGINNING OF VISIT
[0] : 2) Feeling down, depressed, or hopeless: Not at all (0) [[id number]] : 0 [Pain Scale: ___] : On a scale of 1-10, today the patient's pain is a(n) [unfilled]. [Never] : Never [Date Discussed (MM/DD/YY): ___] : Discussed: [unfilled] [With Patient/Caregiver] : with Patient/Caregiver [Abdominal Pain] : no abdominal pain [Vomiting] : no vomiting [Constipation] : no constipation

## 2024-09-26 NOTE — BEGINNING OF VISIT
[0] : 2) Feeling down, depressed, or hopeless: Not at all (0) [[id number]] : 0 [Pain Scale: ___] : On a scale of 1-10, today the patient's pain is a(n) [unfilled]. [Never] : Never [Date Discussed (MM/DD/YY): ___] : Discussed: [unfilled] [With Patient/Caregiver] : with Patient/Caregiver [Abdominal Pain] : no abdominal pain [Vomiting] : no vomiting [Constipation] : no constipation

## 2024-09-26 NOTE — REVIEW OF SYSTEMS
[Negative] : Allergic/Immunologic [Fever] : no fever [Eye Pain] : no eye pain [Dysphagia] : no dysphagia [Chest Pain] : no chest pain [Shortness Of Breath] : no shortness of breath [Abdominal Pain] : no abdominal pain [Vomiting] : no vomiting [Dysuria] : no dysuria [Joint Pain] : no joint pain [Confused] : no confusion [Suicidal] : not suicidal cx on proper ACe bandage use, motirn .apap prn, apply ice to area. f/u with pcp. [Proptosis] : no proptosis [FreeTextEntry7] :  lateral R sided chronic back pain , follows with [facility]  [de-identified] : slight change in sensation in hands following chemotherapy , gradually improving

## 2024-09-26 NOTE — BEGINNING OF VISIT
[0] : 2) Feeling down, depressed, or hopeless: Not at all (0) [[id number]] : 0 [Pain Scale: ___] : On a scale of 1-10, today the patient's pain is a(n) [unfilled]. [Never] : Never [Date Discussed (MM/DD/YY): ___] : Discussed: [unfilled] [With Patient/Caregiver] : with Patient/Caregiver [Abdominal Pain] : no abdominal pain [Vomiting] : no vomiting [Constipation] : no constipation

## 2024-09-26 NOTE — REVIEW OF SYSTEMS
[Negative] : Allergic/Immunologic [Fever] : no fever [Eye Pain] : no eye pain [Dysphagia] : no dysphagia [Chest Pain] : no chest pain [Shortness Of Breath] : no shortness of breath [Abdominal Pain] : no abdominal pain [Vomiting] : no vomiting [Dysuria] : no dysuria [Joint Pain] : no joint pain [Confused] : no confusion [Suicidal] : not suicidal [Proptosis] : no proptosis [FreeTextEntry7] :  lateral R sided chronic back pain , follows with [facility]  [de-identified] : slight change in sensation in hands following chemotherapy , gradually improving

## 2024-09-26 NOTE — ASSESSMENT
[FreeTextEntry1] : 65 yo woman with T3N1 (Stage III) left sided colon moderately differentiated adenocarcinoma ; s/p robotic splenic flexure resection on 12/16/2021. She presents to the oncology clinic for follow up.  # T3N1 (Stage III) left sided colon moderately differentiated adenocarcinoma ; s/p robotic splenic flexure resection on 12/16/2021; ECOG 0-1 - Discussed adjuvant chemo: offered FOLFOX vs XELOX but she chose XELOX for 3 months - s/p Oxaliplatin + Capecitabine 1500mg in AM + 1000mg in PM daily for 2 weeks on, 1 week break every 21 days from 1.25.2022 - 03.29.2022 - s/p colonoscopy at 1 year post surgery in 11/2022 with Dr. Rhoades ; reportedly normal.  - CT C/A/P 5.12.2023 shows stable nonspecific 2.3 cm nodule in the anterior pelvic mesentery - CT C/A/P 1.6.2024 shows stable left hemiabdomen soft tissue lesion and LLL groundglass nodule.   - CT C/A/P 7.8.2024 stable 3 mm LLL nonsolid nodule, stable bilateral posterior right lower lobe subpleural nodularity and 2.0 cm soft tissue nodule at the root of the small bowel mesentery may represent previously described nodule within the left hemiabdomen. - CT C/A/P with IVC ordered to be done ~01/2025, Rx given  - followup with GI as scheduled for colonoscopy (due in 2025 but recommend to be done sooner for close surveillance)  - will continue with close surveillance for now   # Anemia, microcytic; at least partially due to iron deficiency - s/p Venofer 200mg IV weekly x 5 doses , completed on 2.25.2022 - Labwork today: CBC, BMP, LFTs, CEA, iron studies, ferritin   RTC in 01/2025 with Dr. Fajardo with CBC, BMP, LFTs, CEA, iron studies, ferritin level 2 days prior  SEEn/examined w/ NP Jody;note reviewed; case discussed; agree w/plan

## 2024-09-27 DIAGNOSIS — C18.4 MALIGNANT NEOPLASM OF TRANSVERSE COLON: ICD-10-CM

## 2024-09-27 LAB
CEA SERPL-MCNC: 3.2 NG/ML
FERRITIN SERPL-MCNC: 250 NG/ML

## 2025-01-02 ENCOUNTER — LABORATORY RESULT (OUTPATIENT)
Age: 65
End: 2025-01-02

## 2025-01-02 ENCOUNTER — APPOINTMENT (OUTPATIENT)
Age: 65
End: 2025-01-02

## 2025-01-02 ENCOUNTER — OUTPATIENT (OUTPATIENT)
Dept: OUTPATIENT SERVICES | Facility: HOSPITAL | Age: 65
LOS: 1 days | End: 2025-01-02
Payer: MEDICARE

## 2025-01-02 DIAGNOSIS — C18.4 MALIGNANT NEOPLASM OF TRANSVERSE COLON: ICD-10-CM

## 2025-01-02 DIAGNOSIS — Z98.890 OTHER SPECIFIED POSTPROCEDURAL STATES: Chronic | ICD-10-CM

## 2025-01-02 LAB
ALBUMIN SERPL ELPH-MCNC: 4.6 G/DL
ALP BLD-CCNC: 101 U/L
ALT SERPL-CCNC: 26 U/L
ANION GAP SERPL CALC-SCNC: 14 MMOL/L
AST SERPL-CCNC: 23 U/L
BILIRUB DIRECT SERPL-MCNC: 0.2 MG/DL
BILIRUB INDIRECT SERPL-MCNC: 0.3 MG/DL
BILIRUB SERPL-MCNC: 0.5 MG/DL
BUN SERPL-MCNC: 19 MG/DL
CALCIUM SERPL-MCNC: 9.3 MG/DL
CHLORIDE SERPL-SCNC: 105 MMOL/L
CO2 SERPL-SCNC: 24 MMOL/L
CREAT SERPL-MCNC: 0.6 MG/DL
EGFR: 99 ML/MIN/1.73M2
GLUCOSE SERPL-MCNC: 101 MG/DL
HCT VFR BLD CALC: 39.8 %
HGB BLD-MCNC: 13.3 G/DL
IRON SATN MFR SERPL: 36 %
IRON SERPL-MCNC: 112 UG/DL
MCHC RBC-ENTMCNC: 30.8 PG
MCHC RBC-ENTMCNC: 33.4 G/DL
MCV RBC AUTO: 92.1 FL
PLATELET # BLD AUTO: 220 K/UL
PMV BLD: 11.3 FL
POTASSIUM SERPL-SCNC: 3.8 MMOL/L
PROT SERPL-MCNC: 6.8 G/DL
RBC # BLD: 4.32 M/UL
RBC # FLD: 13.2 %
SODIUM SERPL-SCNC: 143 MMOL/L
TIBC SERPL-MCNC: 315 UG/DL
UIBC SERPL-MCNC: 203 UG/DL
WBC # FLD AUTO: 5.84 K/UL

## 2025-01-02 PROCEDURE — 83540 ASSAY OF IRON: CPT

## 2025-01-02 PROCEDURE — 85027 COMPLETE CBC AUTOMATED: CPT

## 2025-01-02 PROCEDURE — 82378 CARCINOEMBRYONIC ANTIGEN: CPT

## 2025-01-02 PROCEDURE — 80076 HEPATIC FUNCTION PANEL: CPT

## 2025-01-02 PROCEDURE — 80048 BASIC METABOLIC PNL TOTAL CA: CPT

## 2025-01-02 PROCEDURE — 83550 IRON BINDING TEST: CPT

## 2025-01-02 PROCEDURE — 82728 ASSAY OF FERRITIN: CPT

## 2025-01-02 PROCEDURE — 36415 COLL VENOUS BLD VENIPUNCTURE: CPT

## 2025-01-03 DIAGNOSIS — C18.4 MALIGNANT NEOPLASM OF TRANSVERSE COLON: ICD-10-CM

## 2025-01-03 LAB
CEA SERPL-MCNC: 3.5 NG/ML
FERRITIN SERPL-MCNC: 208 NG/ML

## 2025-01-04 ENCOUNTER — RESULT REVIEW (OUTPATIENT)
Age: 65
End: 2025-01-04

## 2025-01-04 ENCOUNTER — OUTPATIENT (OUTPATIENT)
Dept: OUTPATIENT SERVICES | Facility: HOSPITAL | Age: 65
LOS: 1 days | End: 2025-01-04
Payer: MEDICARE

## 2025-01-04 DIAGNOSIS — C18.4 MALIGNANT NEOPLASM OF TRANSVERSE COLON: ICD-10-CM

## 2025-01-04 DIAGNOSIS — Z98.890 OTHER SPECIFIED POSTPROCEDURAL STATES: Chronic | ICD-10-CM

## 2025-01-04 PROCEDURE — 71260 CT THORAX DX C+: CPT

## 2025-01-04 PROCEDURE — 74177 CT ABD & PELVIS W/CONTRAST: CPT | Mod: 26

## 2025-01-04 PROCEDURE — 71260 CT THORAX DX C+: CPT | Mod: 26

## 2025-01-04 PROCEDURE — 74177 CT ABD & PELVIS W/CONTRAST: CPT

## 2025-01-05 DIAGNOSIS — C18.4 MALIGNANT NEOPLASM OF TRANSVERSE COLON: ICD-10-CM

## 2025-01-13 ENCOUNTER — APPOINTMENT (OUTPATIENT)
Age: 65
End: 2025-01-13
Payer: MEDICARE

## 2025-01-13 ENCOUNTER — OUTPATIENT (OUTPATIENT)
Dept: OUTPATIENT SERVICES | Facility: HOSPITAL | Age: 65
LOS: 1 days | End: 2025-01-13
Payer: MEDICARE

## 2025-01-13 VITALS
HEIGHT: 62 IN | SYSTOLIC BLOOD PRESSURE: 131 MMHG | DIASTOLIC BLOOD PRESSURE: 77 MMHG | RESPIRATION RATE: 16 BRPM | BODY MASS INDEX: 22.63 KG/M2 | WEIGHT: 123 LBS | TEMPERATURE: 99.4 F | HEART RATE: 91 BPM | OXYGEN SATURATION: 99 %

## 2025-01-13 DIAGNOSIS — C18.4 MALIGNANT NEOPLASM OF TRANSVERSE COLON: ICD-10-CM

## 2025-01-13 DIAGNOSIS — Z98.890 OTHER SPECIFIED POSTPROCEDURAL STATES: Chronic | ICD-10-CM

## 2025-01-13 DIAGNOSIS — D50.9 IRON DEFICIENCY ANEMIA, UNSPECIFIED: ICD-10-CM

## 2025-01-13 PROCEDURE — G2211 COMPLEX E/M VISIT ADD ON: CPT

## 2025-01-13 PROCEDURE — 99214 OFFICE O/P EST MOD 30 MIN: CPT

## 2025-05-16 ENCOUNTER — OUTPATIENT (OUTPATIENT)
Dept: OUTPATIENT SERVICES | Facility: HOSPITAL | Age: 65
LOS: 1 days | End: 2025-05-16
Payer: MEDICARE

## 2025-05-16 ENCOUNTER — APPOINTMENT (OUTPATIENT)
Age: 65
End: 2025-05-16

## 2025-05-16 DIAGNOSIS — Z98.890 OTHER SPECIFIED POSTPROCEDURAL STATES: Chronic | ICD-10-CM

## 2025-05-16 DIAGNOSIS — C18.9 MALIGNANT NEOPLASM OF COLON, UNSPECIFIED: ICD-10-CM

## 2025-05-16 LAB
AUTO BASOPHILS #: 0.04 K/UL
AUTO BASOPHILS %: 0.6 %
AUTO EOSINOPHILS #: 0.08 K/UL
AUTO EOSINOPHILS %: 1.1 %
AUTO IMMATURE GRANULOCYTES #: 0.01 K/UL
AUTO LYMPHOCYTES #: 2.73 K/UL
AUTO LYMPHOCYTES %: 39 %
AUTO MONOCYTES #: 0.63 K/UL
AUTO MONOCYTES %: 9 %
AUTO NEUTROPHILS #: 3.51 K/UL
AUTO NEUTROPHILS %: 50.2 %
AUTO NRBC #: 0 K/UL
HCT VFR BLD CALC: 41 %
HGB BLD-MCNC: 13.8 G/DL
IMM GRANULOCYTES NFR BLD AUTO: 0.1 %
MAN DIFF?: NORMAL
MCHC RBC-ENTMCNC: 30.2 PG
MCHC RBC-ENTMCNC: 33.7 G/DL
MCV RBC AUTO: 89.7 FL
PLATELET # BLD AUTO: 204 K/UL
PMV BLD AUTO: 0 /100 WBCS
PMV BLD: 11.3 FL
RBC # BLD: 4.57 M/UL
RBC # FLD: 13.2 %
WBC # FLD AUTO: 7 K/UL

## 2025-05-16 PROCEDURE — 83540 ASSAY OF IRON: CPT

## 2025-05-16 PROCEDURE — 36415 COLL VENOUS BLD VENIPUNCTURE: CPT

## 2025-05-16 PROCEDURE — 85025 COMPLETE CBC W/AUTO DIFF WBC: CPT

## 2025-05-16 PROCEDURE — 80076 HEPATIC FUNCTION PANEL: CPT

## 2025-05-16 PROCEDURE — 82728 ASSAY OF FERRITIN: CPT

## 2025-05-16 PROCEDURE — 80048 BASIC METABOLIC PNL TOTAL CA: CPT

## 2025-05-16 PROCEDURE — 83550 IRON BINDING TEST: CPT

## 2025-05-17 DIAGNOSIS — C18.9 MALIGNANT NEOPLASM OF COLON, UNSPECIFIED: ICD-10-CM

## 2025-05-17 LAB
ALBUMIN SERPL ELPH-MCNC: 4.4 G/DL
ALP BLD-CCNC: 107 U/L
ALT SERPL-CCNC: 23 U/L
ANION GAP SERPL CALC-SCNC: 13 MMOL/L
AST SERPL-CCNC: 21 U/L
BILIRUB DIRECT SERPL-MCNC: 0.2 MG/DL
BILIRUB INDIRECT SERPL-MCNC: 0.5 MG/DL
BILIRUB SERPL-MCNC: 0.7 MG/DL
BUN SERPL-MCNC: 14 MG/DL
CALCIUM SERPL-MCNC: 9.4 MG/DL
CHLORIDE SERPL-SCNC: 106 MMOL/L
CO2 SERPL-SCNC: 25 MMOL/L
CREAT SERPL-MCNC: 0.7 MG/DL
EGFRCR SERPLBLD CKD-EPI 2021: 95 ML/MIN/1.73M2
FERRITIN SERPL-MCNC: 292 NG/ML
GLUCOSE SERPL-MCNC: 85 MG/DL
IRON SATN MFR SERPL: 19 %
IRON SERPL-MCNC: 54 UG/DL
POTASSIUM SERPL-SCNC: 4.4 MMOL/L
PROT SERPL-MCNC: 7.7 G/DL
SODIUM SERPL-SCNC: 144 MMOL/L
TIBC SERPL-MCNC: 289 UG/DL
UIBC SERPL-MCNC: 235 UG/DL

## 2025-05-19 ENCOUNTER — OUTPATIENT (OUTPATIENT)
Dept: OUTPATIENT SERVICES | Facility: HOSPITAL | Age: 65
LOS: 1 days | End: 2025-05-19
Payer: MEDICARE

## 2025-05-19 ENCOUNTER — APPOINTMENT (OUTPATIENT)
Age: 65
End: 2025-05-19

## 2025-05-19 DIAGNOSIS — C18.4 MALIGNANT NEOPLASM OF TRANSVERSE COLON: ICD-10-CM

## 2025-05-19 DIAGNOSIS — Z98.890 OTHER SPECIFIED POSTPROCEDURAL STATES: Chronic | ICD-10-CM

## 2025-05-19 DIAGNOSIS — C18.9 MALIGNANT NEOPLASM OF COLON, UNSPECIFIED: ICD-10-CM

## 2025-05-19 PROCEDURE — 99213 OFFICE O/P EST LOW 20 MIN: CPT

## 2025-05-19 PROCEDURE — 99213 OFFICE O/P EST LOW 20 MIN: CPT | Mod: 93

## 2025-05-19 PROCEDURE — G2211 COMPLEX E/M VISIT ADD ON: CPT | Mod: 93

## 2025-09-03 ENCOUNTER — TRANSCRIPTION ENCOUNTER (OUTPATIENT)
Age: 65
End: 2025-09-03

## 2025-09-03 ENCOUNTER — APPOINTMENT (OUTPATIENT)
Age: 65
End: 2025-09-03

## 2025-09-03 LAB
ALBUMIN SERPL ELPH-MCNC: 4.4 G/DL
ALP BLD-CCNC: 101 U/L
ALT SERPL-CCNC: 22 U/L
ANION GAP SERPL CALC-SCNC: 13 MMOL/L
AST SERPL-CCNC: 24 U/L
AUTO BASOPHILS #: 0.03 K/UL
AUTO BASOPHILS %: 0.4 %
AUTO EOSINOPHILS #: 0.03 K/UL
AUTO EOSINOPHILS %: 0.4 %
AUTO IMMATURE GRANULOCYTES #: 0.02 K/UL
AUTO LYMPHOCYTES #: 2.94 K/UL
AUTO LYMPHOCYTES %: 43.2 %
AUTO MONOCYTES #: 0.39 K/UL
AUTO MONOCYTES %: 5.7 %
AUTO NEUTROPHILS #: 3.39 K/UL
AUTO NEUTROPHILS %: 50 %
AUTO NRBC #: 0 K/UL
BILIRUB DIRECT SERPL-MCNC: 0.2 MG/DL
BILIRUB INDIRECT SERPL-MCNC: 0.6 MG/DL
BILIRUB SERPL-MCNC: 0.8 MG/DL
BUN SERPL-MCNC: 16 MG/DL
CALCIUM SERPL-MCNC: 9.4 MG/DL
CEA SERPL-MCNC: 4.2 NG/ML
CHLORIDE SERPL-SCNC: 104 MMOL/L
CO2 SERPL-SCNC: 23 MMOL/L
CREAT SERPL-MCNC: 0.8 MG/DL
EGFRCR SERPLBLD CKD-EPI 2021: 81 ML/MIN/1.73M2
GGT SERPL-CCNC: 25 U/L
GLUCOSE SERPL-MCNC: 91 MG/DL
HCT VFR BLD CALC: 41 %
HGB BLD-MCNC: 13.9 G/DL
IMM GRANULOCYTES NFR BLD AUTO: 0.3 %
MAN DIFF?: NORMAL
MCHC RBC-ENTMCNC: 30.8 PG
MCHC RBC-ENTMCNC: 33.9 G/DL
MCV RBC AUTO: 90.9 FL
PLATELET # BLD AUTO: 269 K/UL
PMV BLD AUTO: 0 /100 WBCS
PMV BLD: 10.4 FL
POTASSIUM SERPL-SCNC: 4.2 MMOL/L
PROT SERPL-MCNC: 7.3 G/DL
RBC # BLD: 4.51 M/UL
RBC # FLD: 12.6 %
SODIUM SERPL-SCNC: 140 MMOL/L
WBC # FLD AUTO: 6.8 K/UL

## 2025-09-06 ENCOUNTER — RESULT REVIEW (OUTPATIENT)
Age: 65
End: 2025-09-06

## 2025-09-06 ENCOUNTER — OUTPATIENT (OUTPATIENT)
Dept: OUTPATIENT SERVICES | Facility: HOSPITAL | Age: 65
LOS: 1 days | End: 2025-09-06
Payer: MEDICARE

## 2025-09-06 DIAGNOSIS — Z98.890 OTHER SPECIFIED POSTPROCEDURAL STATES: Chronic | ICD-10-CM

## 2025-09-06 DIAGNOSIS — C18.4 MALIGNANT NEOPLASM OF TRANSVERSE COLON: ICD-10-CM

## 2025-09-06 PROCEDURE — 74177 CT ABD & PELVIS W/CONTRAST: CPT

## 2025-09-06 PROCEDURE — 71260 CT THORAX DX C+: CPT

## 2025-09-06 PROCEDURE — 74177 CT ABD & PELVIS W/CONTRAST: CPT | Mod: 26

## 2025-09-06 PROCEDURE — 71260 CT THORAX DX C+: CPT | Mod: 26

## 2025-09-07 DIAGNOSIS — C18.4 MALIGNANT NEOPLASM OF TRANSVERSE COLON: ICD-10-CM

## 2025-09-16 ENCOUNTER — APPOINTMENT (OUTPATIENT)
Age: 65
End: 2025-09-16
Payer: MEDICARE

## 2025-09-16 VITALS
HEART RATE: 89 BPM | OXYGEN SATURATION: 97 % | SYSTOLIC BLOOD PRESSURE: 129 MMHG | DIASTOLIC BLOOD PRESSURE: 80 MMHG | HEIGHT: 62 IN | TEMPERATURE: 97.6 F | RESPIRATION RATE: 16 BRPM | BODY MASS INDEX: 22.08 KG/M2 | WEIGHT: 120 LBS

## 2025-09-16 DIAGNOSIS — D50.9 IRON DEFICIENCY ANEMIA, UNSPECIFIED: ICD-10-CM

## 2025-09-16 DIAGNOSIS — Z51.11 ENCOUNTER FOR ANTINEOPLASTIC CHEMOTHERAPY: ICD-10-CM

## 2025-09-16 DIAGNOSIS — E11.9 TYPE 2 DIABETES MELLITUS W/OUT COMPLICATIONS: ICD-10-CM

## 2025-09-16 DIAGNOSIS — C18.4 MALIGNANT NEOPLASM OF TRANSVERSE COLON: ICD-10-CM

## 2025-09-16 PROCEDURE — 99204 OFFICE O/P NEW MOD 45 MIN: CPT

## 2025-09-16 PROCEDURE — G2211 COMPLEX E/M VISIT ADD ON: CPT
